# Patient Record
Sex: MALE | Race: WHITE | Employment: UNEMPLOYED | URBAN - METROPOLITAN AREA
[De-identification: names, ages, dates, MRNs, and addresses within clinical notes are randomized per-mention and may not be internally consistent; named-entity substitution may affect disease eponyms.]

---

## 2017-03-19 ENCOUNTER — APPOINTMENT (OUTPATIENT)
Dept: GENERAL RADIOLOGY | Age: 37
End: 2017-03-19
Attending: EMERGENCY MEDICINE
Payer: COMMERCIAL

## 2017-03-19 ENCOUNTER — HOSPITAL ENCOUNTER (EMERGENCY)
Age: 37
Discharge: HOME | End: 2017-03-19
Attending: EMERGENCY MEDICINE
Payer: COMMERCIAL

## 2017-03-19 VITALS
DIASTOLIC BLOOD PRESSURE: 76 MMHG | RESPIRATION RATE: 14 BRPM | HEART RATE: 68 BPM | WEIGHT: 315 LBS | SYSTOLIC BLOOD PRESSURE: 165 MMHG | HEIGHT: 70 IN | BODY MASS INDEX: 45.1 KG/M2 | TEMPERATURE: 97.9 F | OXYGEN SATURATION: 96 %

## 2017-03-19 DIAGNOSIS — F41.9 ANXIETY: ICD-10-CM

## 2017-03-19 DIAGNOSIS — R07.89 ATYPICAL CHEST PAIN: Primary | ICD-10-CM

## 2017-03-19 LAB
ANION GAP BLD CALC-SCNC: 9 MMOL/L (ref 3–18)
ATRIAL RATE: 69 BPM
ATRIAL RATE: 76 BPM
BASOPHILS # BLD AUTO: 0 K/UL (ref 0–0.06)
BASOPHILS # BLD: 1 % (ref 0–2)
BUN SERPL-MCNC: 26 MG/DL (ref 7–18)
BUN/CREAT SERPL: 17 (ref 12–20)
CALCIUM SERPL-MCNC: 8.3 MG/DL (ref 8.5–10.1)
CALCULATED P AXIS, ECG09: 41 DEGREES
CALCULATED P AXIS, ECG09: 44 DEGREES
CALCULATED R AXIS, ECG10: 71 DEGREES
CALCULATED R AXIS, ECG10: 73 DEGREES
CALCULATED T AXIS, ECG11: 75 DEGREES
CALCULATED T AXIS, ECG11: 86 DEGREES
CHLORIDE SERPL-SCNC: 108 MMOL/L (ref 100–108)
CK MB CFR SERPL CALC: NORMAL % (ref 0–4)
CK MB CFR SERPL CALC: NORMAL % (ref 0–4)
CK MB SERPL-MCNC: <1 NG/ML (ref 5–25)
CK MB SERPL-MCNC: <1 NG/ML (ref 5–25)
CK SERPL-CCNC: 145 U/L (ref 39–308)
CK SERPL-CCNC: 199 U/L (ref 39–308)
CO2 SERPL-SCNC: 28 MMOL/L (ref 21–32)
CREAT SERPL-MCNC: 1.5 MG/DL (ref 0.6–1.3)
DIAGNOSIS, 93000: NORMAL
DIAGNOSIS, 93000: NORMAL
DIFFERENTIAL METHOD BLD: ABNORMAL
EOSINOPHIL # BLD: 0.1 K/UL (ref 0–0.4)
EOSINOPHIL NFR BLD: 1 % (ref 0–5)
ERYTHROCYTE [DISTWIDTH] IN BLOOD BY AUTOMATED COUNT: 12.8 % (ref 11.6–14.5)
GLUCOSE SERPL-MCNC: 90 MG/DL (ref 74–99)
HCT VFR BLD AUTO: 47.5 % (ref 36–48)
HGB BLD-MCNC: 16.8 G/DL (ref 13–16)
LYMPHOCYTES # BLD AUTO: 26 % (ref 21–52)
LYMPHOCYTES # BLD: 2.1 K/UL (ref 0.9–3.6)
MCH RBC QN AUTO: 30.4 PG (ref 24–34)
MCHC RBC AUTO-ENTMCNC: 35.4 G/DL (ref 31–37)
MCV RBC AUTO: 85.9 FL (ref 74–97)
MONOCYTES # BLD: 0.9 K/UL (ref 0.05–1.2)
MONOCYTES NFR BLD AUTO: 11 % (ref 3–10)
NEUTS SEG # BLD: 5 K/UL (ref 1.8–8)
NEUTS SEG NFR BLD AUTO: 61 % (ref 40–73)
P-R INTERVAL, ECG05: 126 MS
P-R INTERVAL, ECG05: 132 MS
PLATELET # BLD AUTO: 330 K/UL (ref 135–420)
PMV BLD AUTO: 9 FL (ref 9.2–11.8)
POTASSIUM SERPL-SCNC: 3.9 MMOL/L (ref 3.5–5.5)
Q-T INTERVAL, ECG07: 372 MS
Q-T INTERVAL, ECG07: 412 MS
QRS DURATION, ECG06: 92 MS
QRS DURATION, ECG06: 92 MS
QTC CALCULATION (BEZET), ECG08: 418 MS
QTC CALCULATION (BEZET), ECG08: 441 MS
RBC # BLD AUTO: 5.53 M/UL (ref 4.7–5.5)
SODIUM SERPL-SCNC: 145 MMOL/L (ref 136–145)
TROPONIN I SERPL-MCNC: 0.02 NG/ML (ref 0–0.06)
TROPONIN I SERPL-MCNC: 0.02 NG/ML (ref 0–0.06)
VENTRICULAR RATE, ECG03: 69 BPM
VENTRICULAR RATE, ECG03: 76 BPM
WBC # BLD AUTO: 8.1 K/UL (ref 4.6–13.2)

## 2017-03-19 PROCEDURE — 93005 ELECTROCARDIOGRAM TRACING: CPT

## 2017-03-19 PROCEDURE — 80048 BASIC METABOLIC PNL TOTAL CA: CPT | Performed by: EMERGENCY MEDICINE

## 2017-03-19 PROCEDURE — 99284 EMERGENCY DEPT VISIT MOD MDM: CPT

## 2017-03-19 PROCEDURE — 82550 ASSAY OF CK (CPK): CPT | Performed by: EMERGENCY MEDICINE

## 2017-03-19 PROCEDURE — 71010 XR CHEST PORT: CPT

## 2017-03-19 PROCEDURE — 85025 COMPLETE CBC W/AUTO DIFF WBC: CPT | Performed by: EMERGENCY MEDICINE

## 2017-03-19 PROCEDURE — 74011250637 HC RX REV CODE- 250/637: Performed by: PHYSICIAN ASSISTANT

## 2017-03-19 RX ORDER — HYDROCODONE BITARTRATE AND ACETAMINOPHEN 5; 325 MG/1; MG/1
TABLET ORAL
COMMUNITY
End: 2018-02-02

## 2017-03-19 RX ORDER — HYDROCODONE BITARTRATE AND ACETAMINOPHEN 5; 325 MG/1; MG/1
1 TABLET ORAL
Status: COMPLETED | OUTPATIENT
Start: 2017-03-19 | End: 2017-03-19

## 2017-03-19 RX ORDER — METOPROLOL TARTRATE 100 MG/1
200 TABLET ORAL ONCE
Status: ON HOLD | COMMUNITY
End: 2018-11-17 | Stop reason: SDUPTHER

## 2017-03-19 RX ADMIN — HYDROCODONE BITARTRATE AND ACETAMINOPHEN 1 TABLET: 5; 325 TABLET ORAL at 16:50

## 2017-03-19 NOTE — ED NOTES
Patient armband removed and shredded I have reviewed discharge instructions with the patient. The patient verbalized understanding. Patient NAD at discharge.

## 2017-03-19 NOTE — DISCHARGE INSTRUCTIONS
Anxiety Disorder: Care Instructions  Your Care Instructions  Anxiety is a normal reaction to stress. Difficult situations can cause you to have symptoms such as sweaty palms and a nervous feeling. In an anxiety disorder, the symptoms are far more severe. Constant worry, muscle tension, trouble sleeping, nausea and diarrhea, and other symptoms can make normal daily activities difficult or impossible. These symptoms may occur for no reason, and they can affect your work, school, or social life. Medicines, counseling, and self-care can all help. Follow-up care is a key part of your treatment and safety. Be sure to make and go to all appointments, and call your doctor if you are having problems. It's also a good idea to know your test results and keep a list of the medicines you take. How can you care for yourself at home? · Take medicines exactly as directed. Call your doctor if you think you are having a problem with your medicine. · Go to your counseling sessions and follow-up appointments. · Recognize and accept your anxiety. Then, when you are in a situation that makes you anxious, say to yourself, \"This is not an emergency. I feel uncomfortable, but I am not in danger. I can keep going even if I feel anxious. \"  · Be kind to your body:  ¨ Relieve tension with exercise or a massage. ¨ Get enough rest.  ¨ Avoid alcohol, caffeine, nicotine, and illegal drugs. They can increase your anxiety level and cause sleep problems. ¨ Learn and do relaxation techniques. See below for more about these techniques. · Engage your mind. Get out and do something you enjoy. Go to a funny movie, or take a walk or hike. Plan your day. Having too much or too little to do can make you anxious. · Keep a record of your symptoms. Discuss your fears with a good friend or family member, or join a support group for people with similar problems. Talking to others sometimes relieves stress.   · Get involved in social groups, or volunteer to help others. Being alone sometimes makes things seem worse than they are. · Get at least 30 minutes of exercise on most days of the week to relieve stress. Walking is a good choice. You also may want to do other activities, such as running, swimming, cycling, or playing tennis or team sports. Relaxation techniques  Do relaxation exercises 10 to 20 minutes a day. You can play soothing, relaxing music while you do them, if you wish. · Tell others in your house that you are going to do your relaxation exercises. Ask them not to disturb you. · Find a comfortable place, away from all distractions and noise. · Lie down on your back, or sit with your back straight. · Focus on your breathing. Make it slow and steady. · Breathe in through your nose. Breathe out through either your nose or mouth. · Breathe deeply, filling up the area between your navel and your rib cage. Breathe so that your belly goes up and down. · Do not hold your breath. · Breathe like this for 5 to 10 minutes. Notice the feeling of calmness throughout your whole body. As you continue to breathe slowly and deeply, relax by doing the following for another 5 to 10 minutes:  · Tighten and relax each muscle group in your body. You can begin at your toes and work your way up to your head. · Imagine your muscle groups relaxing and becoming heavy. · Empty your mind of all thoughts. · Let yourself relax more and more deeply. · Become aware of the state of calmness that surrounds you. · When your relaxation time is over, you can bring yourself back to alertness by moving your fingers and toes and then your hands and feet and then stretching and moving your entire body. Sometimes people fall asleep during relaxation, but they usually wake up shortly afterward. · Always give yourself time to return to full alertness before you drive a car or do anything that might cause an accident if you are not fully alert.  Never play a relaxation tape while you drive a car. When should you call for help? Call 911 anytime you think you may need emergency care. For example, call if:  · You feel you cannot stop from hurting yourself or someone else. Keep the numbers for these national suicide hotlines: 1-843-339-TALK (2-585.855.3634) and 9-191-EVRINZN (6-369.967.5885). If you or someone you know talks about suicide or feeling hopeless, get help right away. Watch closely for changes in your health, and be sure to contact your doctor if:  · You have anxiety or fear that affects your life. · You have symptoms of anxiety that are new or different from those you had before. Where can you learn more? Go to http://cristobalEKK Sweet Teaskeith.info/. Enter P754 in the search box to learn more about \"Anxiety Disorder: Care Instructions. \"  Current as of: July 26, 2016  Content Version: 11.1  © 0936-2980 [employer]. Care instructions adapted under license by [employer] (which disclaims liability or warranty for this information). If you have questions about a medical condition or this instruction, always ask your healthcare professional. [address] any warranty or liability for your use of this information. Chest Pain: Care Instructions  Your Care Instructions  There are many things that can cause chest pain. Some are not serious and will get better on their own in a few days. But some kinds of chest pain need more testing and treatment. Your doctor may have recommended a follow-up visit in the next 8 to 12 hours. If you are not getting better, you may need more tests or treatment. Even though your doctor has released you, you still need to watch for any problems. The doctor carefully checked you, but sometimes problems can develop later. If you have new symptoms or if your symptoms do not get better, get medical care right away.   If you have worse or different chest pain or pressure that lasts more than 5 minutes or you passed out (lost consciousness), call 911 or seek other emergency help right away. A medical visit is only one step in your treatment. Even if you feel better, you still need to do what your doctor recommends, such as going to all suggested follow-up appointments and taking medicines exactly as directed. This will help you recover and help prevent future problems. How can you care for yourself at home? · Rest until you feel better. · Take your medicine exactly as prescribed. Call your doctor if you think you are having a problem with your medicine. · Do not drive after taking a prescription pain medicine. When should you call for help? Call 911 if:  · You passed out (lost consciousness). · You have severe difficulty breathing. · You have symptoms of a heart attack. These may include:  ¨ Chest pain or pressure, or a strange feeling in your chest.  ¨ Sweating. ¨ Shortness of breath. ¨ Nausea or vomiting. ¨ Pain, pressure, or a strange feeling in your back, neck, jaw, or upper belly or in one or both shoulders or arms. ¨ Lightheadedness or sudden weakness. ¨ A fast or irregular heartbeat. After you call 911, the  may tell you to chew 1 adult-strength or 2 to 4 low-dose aspirin. Wait for an ambulance. Do not try to drive yourself. Call your doctor today if:  · You have any trouble breathing. · Your chest pain gets worse. · You are dizzy or lightheaded, or you feel like you may faint. · You are not getting better as expected. · You are having new or different chest pain. Where can you learn more? Go to http://cristobal-keith.info/. Enter A120 in the search box to learn more about \"Chest Pain: Care Instructions. \"  Current as of: May 27, 2016  Content Version: 11.1  © 8415-1067 [employer]. Care instructions adapted under license by [employer] (which disclaims liability or warranty for this information).  If you have questions about a medical condition or this instruction, always ask your healthcare professional. [place] [address] any warranty or liability for your use of this information.

## 2017-03-19 NOTE — ED TRIAGE NOTES
Patient arrived with c/o chest pain that started 30 minutes prior to arrival. States he thinks its an anxiety attack. Sepsis Screening completed    (  )Patient meets SIRS criteria. ( x )Patient does not meet SIRS criteria.       SIRS Criteria is achieved when two or more of the following are present   Temperature < 96.8°F (36°C) or > 100.9°F (38.3°C)   Heart Rate > 90 beats per minute   Respiratory Rate > 20 breaths per minute   WBC count > 12,000 or <4,000 or > 10% bands

## 2017-03-19 NOTE — ED NOTES
Pt hourly rounding competed. Safety   Pt (x) resting on stretcher with side rails up and call bell in reach. () in chair    () in parents arms. Toileting   Pt offered ()Bedpan     (x)Assistance to Restroom     ()Urinal  Ongoing Updates  Updated on plan of care and status of test results.   Pain Management  Inquired as to comfort and offered comfort measures:    () warm blankets   () dimmed lights

## 2017-03-19 NOTE — ED PROVIDER NOTES
[address]  EMERGENCY DEPARTMENT HISTORY AND PHYSICAL EXAM       Date: 3/19/2017   Patient Name: Claribel Pang   YOB: 1980  Medical Record Number: [id number]    History of Presenting Illness     Chief Complaint   Patient presents with    Chest Pain        History Provided By:  patient    Additional History:   2:20 PM   Clariebl Pang is a 42 y.o. male h/o anxiety, HTN, and high cholesterol who presents to the emergency department C/O \" I had a panic attack, but my wife thinks I had a heart attack 1 hour ago. \" Symptoms include a squeezing left sided chest pain radiating to the left shoulder and neck. Pt normally has CP 1x every 1.5 months, which he states is normal for his anxiety but today was more severe. 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 Pt had a stress test 5 years ago. Pt just started taking his HTN medications a month ago, but was prescribed it 1 year ago. Pt denies tachycardia, SOB, and any other symptoms or complaints.       Primary Care Provider: Lorenzo Pascual MD   Specialist:    Past History     Past Medical History:   Past Medical History:   Diagnosis Date    ADD (attention deficit disorder)     Chest tightness 4/14/2011    Fatigue     Hyperlipidemia     Palpitation 4/14/2011    Psoriatic arthritis ([address]     SOB (shortness of breath) on exertion 4/14/2011        Past Surgical History:   Past Surgical History:   Procedure Laterality Date    HX TONSIL AND ADENOIDECTOMY          Family History:   Family History   Problem Relation Age of Onset    Heart Attack Mother 36    Heart Attack Father 48    Heart Surgery Father 48        Social History:   Social History   Substance Use Topics    Smoking status: Former Smoker     Packs/day: 1.00     Years: 18.00     Types: Cigarettes     Quit date: 1/1/2008    Smokeless tobacco: None    Alcohol use Yes        Allergies:   No Known Allergies     Review of Systems Review of Systems   Respiratory: Negative for shortness of breath. Cardiovascular: Positive for chest pain (radiating to the left side of neck and shoulder). - Tachycardia   All other systems reviewed and are negative. Physical Exam  Vitals:    03/19/17 1409 03/19/17 1530 03/19/17 1800   BP: (!) 188/96 165/72 165/76   Pulse: 82 68 68   Resp: 18 21 14   Temp: 97.9 °F (36.6 °C)     SpO2: 98% 94% 96%   Weight: 158.8 kg (350 lb)     Height: 5' 10\" (1.778 m)         Physical Exam   Nursing note and vitals reviewed. Vital signs and nursing notes reviewed. CONSTITUTIONAL: Alert. Well-appearing; morbidly obese; in no apparent distress. HEAD: Normocephalic; atraumatic. EYES: PERRL; Conjunctiva clear. ENT: Moist mucus membranes. NECK: Supple; FROM without difficulty, non-tender; no cervical lymphadenopathy. CV: Normal S1, S2; no murmurs, rubs, or gallops. No chest wall tenderness. RESPIRATORY: Normal chest excursion with respiration; breath sounds clear and equal bilaterally; no wheezes, rhonchi, or rales. GI: Normal bowel sounds; non-distended; non-tender; no guarding or rigidity; no palpable organomegaly. No CVA tenderness. BACK:  No evidence of trauma or deformity. Non-tender to palpation. EXT: Normal ROM in all four extremities; non-tender to palpation. 1+ pitting edema bilaterally. No calf tenderness. Some superficial excoriations to bilateral shins from dry skin and scratching. SKIN: Normal for age and race; warm; dry; good turgor; no apparent lesions or exudate. NEURO: A & O x3. Cranial nerves 2-12 intact. Motor 5/5 bilaterally. Sensation intact. PSYCH:  Mood and affect appropriate. Diagnostic Study Results     Labs -      No results found for this or any previous visit (from the past 12 hour(s)). Radiologic Studies -  2:44 PM  RADIOLOGY FINDINGS  Chest X-ray shows no acute process.   Pending review by Radiologist  Recorded by RAFY Caballero, as dictated by Stephenie David PA-C     XR CHEST PORT   Final Result   No radiographic evidence of acute cardiopulmonary abnormality. As read by the radiologist.        Medical Decision Making   I am the first provider for this patient. I reviewed the vital signs, available nursing notes, past medical history, past surgical history, family history and social history. Vital Signs-Reviewed the patient's vital signs. No data found. Pulse Oximetry Analysis - Normal 100% on room air. Cardiac Monitor:   Rate: 86 bpm  Rhythm: Normal Sinus Rhythm     EKG interpretation: (Preliminary)  NSR. Rate: 76 bpm. No ST-T changes. EKG read by Stephenie David PA-C at 2:02 PM     EKG interpretation: (Secondary)  NSR. Rate: 69 bpm. Unchanged from previous. EKG read by Stephenie David PA-C at 5:29 PM       Old Medical Records: Old medical records. Nursing notes. ED Course:    2:20 PM   Initial assessment performed. 4:32 PM  Pt states he is currently taking Vicodin for a toothache and would  like a dose now.      6:23 PM Discussed pt with Tania Hoff MD. Pt's heart score is 3 with 2 sets of neg cardiac enzymes. Pt is currently pain free while in the department. Despite risk factors, pt is addiment that this is anxiety prior to his open house. However, it has been six years since his last stress test, so I will refer him to cardiology follow up. MLP ATTESTATION - PATIENT INVOLVEMENT - DISCUSSION:  I was present in the immediate clinical area during the time of the patient evaluation and available to provide input. I have reviewed the chart and agree with the documentation recorded by the [facility], including the assessment, treatment plan, and disposition. Any procedures performed, as noted, were under my direction and / or I was present for the key portions.      In this particular case, I discussed the findings and results with the MLP and provided specific input and direction as to the care of the patient prior to the patient's discharge / disposition. Specifically, based on the patient's presentation and the results of ordered studies there does not appear to be acute medical / surgical process which warrants admission. Therefore, the patient can be treated symptomatically and discharged to continue with this treatment and planned follow -up. Charly Ring MD            Medications Given in the ED:  Medications   HYDROcodone-acetaminophen (NORCO) 5-325 mg per tablet 1 Tab (1 Tab Oral Given 3/19/17 1650)       Discharge Note:  6:30 PM   Pt has been reexamined. Patient has no new complaints, changes, or physical findings. Care plan outlined and precautions discussed. Results were reviewed with the patient. All medications were reviewed with the patient; will d/c home. All of pt's questions and concerns were addressed. Patient was instructed and agrees to follow up with cardiology, as well as to return to the ED upon further deterioration. Patient is ready to go home. Diagnosis   Clinical Impression:   1. Atypical chest pain    2. Anxiety           Follow-up Information     Follow up With Details Comments 900 Darrell Lei MD In 2 days  [address] [place] [address] [address] [facility] EMERGENCY DEPT  As needed, If symptoms worsen [address] [place] [id number]  [phone]          Discharge Medication List as of 3/19/2017  6:24 PM          _______________________________   Attestations: This note is prepared by Sofie Kussmaul, acting as a Scribe for [employer] on 2:19 PM on 3/19/2017 . [employer]: The scribe's documentation has been prepared under my direction and personally reviewed by me in its entirety.   _______________________________

## 2018-01-30 ENCOUNTER — HOSPITAL ENCOUNTER (OUTPATIENT)
Dept: LAB | Age: 38
Discharge: HOME | End: 2018-01-30

## 2018-01-30 PROCEDURE — 99001 SPECIMEN HANDLING PT-LAB: CPT | Performed by: FAMILY MEDICINE

## 2018-02-02 RX ORDER — COLESEVELAM 180 1/1
1875 TABLET ORAL 2 TIMES DAILY WITH MEALS
Status: ON HOLD | COMMUNITY
End: 2018-12-27

## 2018-02-02 RX ORDER — POTASSIUM CHLORIDE 750 MG/1
10 CAPSULE, EXTENDED RELEASE ORAL 2 TIMES DAILY
Status: ON HOLD | COMMUNITY
End: 2018-12-27

## 2018-02-02 RX ORDER — LEVOTHYROXINE SODIUM 125 UG/1
150 TABLET ORAL
Status: ON HOLD | COMMUNITY
End: 2018-12-27

## 2018-02-02 RX ORDER — LISINOPRIL AND HYDROCHLOROTHIAZIDE 20; 25 MG/1; MG/1
1 TABLET ORAL
COMMUNITY
End: 2018-11-17

## 2018-02-02 RX ORDER — LANOLIN ALCOHOL/MO/W.PET/CERES
400 CREAM (GRAM) TOPICAL
COMMUNITY

## 2018-02-02 NOTE — PROGRESS NOTES
PT aware of NPO status. PT aware of need to hold anticoagulants per protocol. PT aware of potential for sedation administration and need for  at discharge. PT aware of arrival time pre procedure, 0730. Pt states no questions at this time, and has our number. Pt is extremely anxious about procedure.

## 2018-02-14 ENCOUNTER — HOSPITAL ENCOUNTER (OUTPATIENT)
Dept: CT IMAGING | Age: 38
Discharge: HOME | End: 2018-02-14
Attending: INTERNAL MEDICINE | Admitting: RADIOLOGY
Payer: COMMERCIAL

## 2018-02-14 VITALS
BODY MASS INDEX: 45.1 KG/M2 | TEMPERATURE: 97.6 F | DIASTOLIC BLOOD PRESSURE: 77 MMHG | OXYGEN SATURATION: 96 % | WEIGHT: 315 LBS | SYSTOLIC BLOOD PRESSURE: 141 MMHG | HEIGHT: 70 IN | HEART RATE: 66 BPM | RESPIRATION RATE: 16 BRPM

## 2018-02-14 DIAGNOSIS — N17.9 ACUTE RENAL FAILURE SYNDROME (HCC): ICD-10-CM

## 2018-02-14 LAB
ANION GAP SERPL CALC-SCNC: 9 MMOL/L (ref 3–18)
APTT PPP: 36 SEC (ref 23–36.4)
BASOPHILS # BLD: 0 K/UL (ref 0–0.06)
BASOPHILS NFR BLD: 0 % (ref 0–2)
BUN SERPL-MCNC: 31 MG/DL (ref 7–18)
BUN/CREAT SERPL: 11 (ref 12–20)
CALCIUM SERPL-MCNC: 8.9 MG/DL (ref 8.5–10.1)
CHLORIDE SERPL-SCNC: 108 MMOL/L (ref 100–108)
CO2 SERPL-SCNC: 21 MMOL/L (ref 21–32)
CREAT SERPL-MCNC: 2.79 MG/DL (ref 0.6–1.3)
DIFFERENTIAL METHOD BLD: ABNORMAL
EOSINOPHIL # BLD: 0.1 K/UL (ref 0–0.4)
EOSINOPHIL NFR BLD: 2 % (ref 0–5)
ERYTHROCYTE [DISTWIDTH] IN BLOOD BY AUTOMATED COUNT: 13 % (ref 11.6–14.5)
GLUCOSE SERPL-MCNC: 106 MG/DL (ref 74–99)
HCT VFR BLD AUTO: 45 % (ref 36–48)
HGB BLD-MCNC: 15.2 G/DL (ref 13–16)
INR PPP: 1 (ref 0.8–1.2)
LYMPHOCYTES # BLD: 2 K/UL (ref 0.9–3.6)
LYMPHOCYTES NFR BLD: 28 % (ref 21–52)
MCH RBC QN AUTO: 29.5 PG (ref 24–34)
MCHC RBC AUTO-ENTMCNC: 33.8 G/DL (ref 31–37)
MCV RBC AUTO: 87.4 FL (ref 74–97)
MONOCYTES # BLD: 0.8 K/UL (ref 0.05–1.2)
MONOCYTES NFR BLD: 11 % (ref 3–10)
NEUTS SEG # BLD: 4.3 K/UL (ref 1.8–8)
NEUTS SEG NFR BLD: 59 % (ref 40–73)
PLATELET # BLD AUTO: 395 K/UL (ref 135–420)
PMV BLD AUTO: 8.7 FL (ref 9.2–11.8)
POTASSIUM SERPL-SCNC: 4.6 MMOL/L (ref 3.5–5.5)
PROTHROMBIN TIME: 13 SEC (ref 11.5–15.2)
RBC # BLD AUTO: 5.15 M/UL (ref 4.7–5.5)
SODIUM SERPL-SCNC: 138 MMOL/L (ref 136–145)
WBC # BLD AUTO: 7.2 K/UL (ref 4.6–13.2)

## 2018-02-14 PROCEDURE — 99153 MOD SED SAME PHYS/QHP EA: CPT

## 2018-02-14 PROCEDURE — 74011000250 HC RX REV CODE- 250: Performed by: RADIOLOGY

## 2018-02-14 PROCEDURE — 77012 CT SCAN FOR NEEDLE BIOPSY: CPT

## 2018-02-14 PROCEDURE — 88348 ELECTRON MICROSCOPY DX: CPT | Performed by: RADIOLOGY

## 2018-02-14 PROCEDURE — 85025 COMPLETE CBC W/AUTO DIFF WBC: CPT | Performed by: INTERNAL MEDICINE

## 2018-02-14 PROCEDURE — 80048 BASIC METABOLIC PNL TOTAL CA: CPT | Performed by: INTERNAL MEDICINE

## 2018-02-14 PROCEDURE — 88313 SPECIAL STAINS GROUP 2: CPT | Performed by: RADIOLOGY

## 2018-02-14 PROCEDURE — 74011250637 HC RX REV CODE- 250/637: Performed by: INTERNAL MEDICINE

## 2018-02-14 PROCEDURE — 85730 THROMBOPLASTIN TIME PARTIAL: CPT | Performed by: INTERNAL MEDICINE

## 2018-02-14 PROCEDURE — 74011250636 HC RX REV CODE- 250/636: Performed by: RADIOLOGY

## 2018-02-14 PROCEDURE — 74011250637 HC RX REV CODE- 250/637: Performed by: RADIOLOGY

## 2018-02-14 PROCEDURE — 36415 COLL VENOUS BLD VENIPUNCTURE: CPT | Performed by: INTERNAL MEDICINE

## 2018-02-14 PROCEDURE — 88334 PATH CONSLTJ SURG CYTO XM EA: CPT | Performed by: RADIOLOGY

## 2018-02-14 PROCEDURE — 88350 IMFLUOR EA ADDL 1ANTB STN PX: CPT | Performed by: RADIOLOGY

## 2018-02-14 PROCEDURE — 74011000250 HC RX REV CODE- 250

## 2018-02-14 PROCEDURE — 88305 TISSUE EXAM BY PATHOLOGIST: CPT | Performed by: RADIOLOGY

## 2018-02-14 PROCEDURE — 99152 MOD SED SAME PHYS/QHP 5/>YRS: CPT

## 2018-02-14 PROCEDURE — 88346 IMFLUOR 1ST 1ANTB STAIN PX: CPT | Performed by: RADIOLOGY

## 2018-02-14 PROCEDURE — 88333 PATH CONSLTJ SURG CYTO XM 1: CPT | Performed by: RADIOLOGY

## 2018-02-14 PROCEDURE — 85610 PROTHROMBIN TIME: CPT | Performed by: INTERNAL MEDICINE

## 2018-02-14 RX ORDER — HYDROCODONE BITARTRATE AND ACETAMINOPHEN 10; 325 MG/1; MG/1
1 TABLET ORAL
Status: DISCONTINUED | OUTPATIENT
Start: 2018-02-14 | End: 2018-02-14 | Stop reason: HOSPADM

## 2018-02-14 RX ORDER — SODIUM CHLORIDE 9 MG/ML
25 INJECTION, SOLUTION INTRAVENOUS CONTINUOUS
Status: DISCONTINUED | OUTPATIENT
Start: 2018-02-14 | End: 2018-02-14 | Stop reason: HOSPADM

## 2018-02-14 RX ORDER — FLUMAZENIL 0.1 MG/ML
0.2 INJECTION INTRAVENOUS
Status: DISCONTINUED | OUTPATIENT
Start: 2018-02-14 | End: 2018-02-14 | Stop reason: HOSPADM

## 2018-02-14 RX ORDER — SODIUM BICARBONATE 84 MG/ML
1 INJECTION, SOLUTION INTRAVENOUS
Status: DISCONTINUED | OUTPATIENT
Start: 2018-02-14 | End: 2018-02-14 | Stop reason: HOSPADM

## 2018-02-14 RX ORDER — LIDOCAINE HYDROCHLORIDE 10 MG/ML
1-20 INJECTION INFILTRATION; PERINEURAL
Status: DISCONTINUED | OUTPATIENT
Start: 2018-02-14 | End: 2018-02-14 | Stop reason: HOSPADM

## 2018-02-14 RX ORDER — LORAZEPAM 1 MG/1
1 TABLET ORAL ONCE
Status: COMPLETED | OUTPATIENT
Start: 2018-02-14 | End: 2018-02-14

## 2018-02-14 RX ORDER — SODIUM BICARBONATE 1 MEQ/ML
SYRINGE (ML) INTRAVENOUS
Status: COMPLETED
Start: 2018-02-14 | End: 2018-02-14

## 2018-02-14 RX ORDER — MIDAZOLAM HYDROCHLORIDE 1 MG/ML
.5-4 INJECTION, SOLUTION INTRAMUSCULAR; INTRAVENOUS
Status: DISCONTINUED | OUTPATIENT
Start: 2018-02-14 | End: 2018-02-14 | Stop reason: HOSPADM

## 2018-02-14 RX ORDER — NALOXONE HYDROCHLORIDE 0.4 MG/ML
0.2 INJECTION, SOLUTION INTRAMUSCULAR; INTRAVENOUS; SUBCUTANEOUS AS NEEDED
Status: DISCONTINUED | OUTPATIENT
Start: 2018-02-14 | End: 2018-02-14 | Stop reason: HOSPADM

## 2018-02-14 RX ORDER — ONDANSETRON 2 MG/ML
4 INJECTION INTRAMUSCULAR; INTRAVENOUS
Status: DISCONTINUED | OUTPATIENT
Start: 2018-02-14 | End: 2018-02-14 | Stop reason: HOSPADM

## 2018-02-14 RX ORDER — FENTANYL CITRATE 50 UG/ML
25-200 INJECTION, SOLUTION INTRAMUSCULAR; INTRAVENOUS
Status: DISCONTINUED | OUTPATIENT
Start: 2018-02-14 | End: 2018-02-14 | Stop reason: HOSPADM

## 2018-02-14 RX ADMIN — FENTANYL CITRATE 50 MCG: 50 INJECTION, SOLUTION INTRAMUSCULAR; INTRAVENOUS at 10:20

## 2018-02-14 RX ADMIN — MIDAZOLAM HYDROCHLORIDE 2 MG: 1 INJECTION, SOLUTION INTRAMUSCULAR; INTRAVENOUS at 10:20

## 2018-02-14 RX ADMIN — SODIUM BICARBONATE 2 MEQ: 84 INJECTION, SOLUTION INTRAVENOUS at 10:22

## 2018-02-14 RX ADMIN — LIDOCAINE HYDROCHLORIDE 14 ML: 10 INJECTION, SOLUTION INFILTRATION; PERINEURAL at 10:22

## 2018-02-14 RX ADMIN — FENTANYL CITRATE 50 MCG: 50 INJECTION, SOLUTION INTRAMUSCULAR; INTRAVENOUS at 10:13

## 2018-02-14 RX ADMIN — SODIUM CHLORIDE 25 ML/HR: 900 INJECTION, SOLUTION INTRAVENOUS at 09:03

## 2018-02-14 RX ADMIN — HYDROCODONE BITARTRATE AND ACETAMINOPHEN 1 TABLET: 10; 325 TABLET ORAL at 16:17

## 2018-02-14 RX ADMIN — LORAZEPAM 1 MG: 1 TABLET ORAL at 08:24

## 2018-02-14 NOTE — PROCEDURES
Vascular & Interventional Radiology Brief Procedure Note    Interventional Radiologist: Marcel Mccoy MD    Pre-operative Diagnosis:  STEPH, proteinuria. Post-operative Diagnosis: Same as pre-op dx    Procedure(s) Performed:  16 g X 3 medical core bx lower pole left kidney. Assistant:  none    Anesthesia:  Local and Moderate Sedation    Findings:  Uncomplcated bx, satisfactory per Dr. Deven Tineo. Complications: None    Estimated Blood Loss:  minimal    Tubes and Drains: None    Specimens: to pathology.     Condition: Good    [facility] Armida Lamar MD, MD  [facility]  Vascular & Interventional Radiology  2/14/2018

## 2018-02-14 NOTE — DISCHARGE INSTRUCTIONS
Needle Kidney Biopsy: What to Expect at Home  Your Recovery    After a needle kidney biopsy, you will be told to lie down on your back for several hours. After this, you should avoid strenuous activity for the next 2 to 3 days. It's normal to feel some soreness in the area of the biopsy for 2 to 3 days. You may have a small amount of bleeding on the bandage after the biopsy. You may notice some blood in your urine after the biopsy. This should go away within 12 to 24 hours. If it doesn't, call your doctor. If you are feeling well, you should be able to get back to work within a week. But avoid strenuous activity, such as heavy lifting, for up to another week. This care sheet gives you a general idea about how long it will take for you to recover. But each person recovers at a different pace. Follow the steps below to feel better as quickly as possible. How can you care for yourself at home? Activity  ? · Avoid lifting anything that would make you strain. This may include heavy grocery bags and milk containers, a heavy briefcase or backpack, cat litter or dog food bags, a vacuum , or a child. ? · Avoid strenuous activities, such as bicycle riding, jogging, weight lifting, or aerobic exercise, until your doctor says it is okay. ? · Try to walk each day. Start by walking a little more than you did the day before. Bit by bit, increase the amount you walk. Walking boosts blood flow and helps prevent pneumonia and constipation. ? · Rest when you feel tired. Getting enough sleep will help you recover. ? · Ask your doctor when it is okay for you to have sex. Diet  ? · You can eat your normal diet. If your stomach is upset, try bland, low-fat foods like plain rice, broiled chicken, toast, and yogurt. ? · Drink plenty of fluids to avoid becoming dehydrated. Medicines  ? · Your doctor will tell you if and when you can restart your medicines.  He or she will also give you instructions about taking any new medicines. ? · If you take blood thinners, such as warfarin (Coumadin), clopidogrel (Plavix), or aspirin, be sure to talk to your doctor. He or she will tell you if and when to start taking those medicines again. Make sure that you understand exactly what your doctor wants you to do. ? · Do not take aspirin or anti-inflammatory medicines for a week after the biopsy. Incision care  ? · Keep a bandage over the puncture site for the first 1 or 2 days. Follow-up care is a key part of your treatment and safety. Be sure to make and go to all appointments, and call your doctor if you are having problems. It's also a good idea to know your test results and keep a list of the medicines you take. When should you call for help? Call 911 anytime you think you may need emergency care. For example, call if:  ? · You passed out (lost consciousness). ?Call your doctor now or seek immediate medical care if:  ? · You have signs of infection, such as:  ¨ Increased pain, swelling, warmth, or redness. ¨ Red streaks leading from the puncture site. ¨ Pus draining from the puncture site. ¨ A fever. ? · Bright red blood has soaked through the bandage over the puncture site. ? · You have new or worse pain at the puncture site. ? Watch closely for any changes in your health, and call your doctor if:  ? · You have blood in your urine for more than 1 day. Where can you learn more? Go to http://cristobal-keith.info/. Enter S675 in the search box to learn more about \"Needle Kidney Biopsy: What to Expect at Home. \"  Current as of: May 12, 2017  Content Version: 11.4  © 4452-5759 Healthwise, Incorporated. Care instructions adapted under license by [employer] (which disclaims liability or warranty for this information).  If you have questions about a medical condition or this instruction, always ask your healthcare professional. [facility] any warranty or liability for your use of this information. DISCHARGE SUMMARY from Nurse    PATIENT INSTRUCTIONS:    After general anesthesia or intravenous sedation, for 24 hours or while taking prescription Narcotics:  · Limit your activities  · Do not drive and operate hazardous machinery  · Do not make important personal or business decisions  · Do  not drink alcoholic beverages  · If you have not urinated within 8 hours after discharge, please contact your surgeon on call. Report the following to your surgeon:  · Excessive pain, swelling, redness or odor of or around the surgical area  · Temperature over 100.5  · Nausea and vomiting lasting longer than 4 hours or if unable to take medications  · Any signs of decreased circulation or nerve impairment to extremity: change in color, persistent  numbness, tingling, coldness or increase pain  · Any questions    What to do at Home:  Recommended activity: Activity as tolerated and no driving for today,       *  Please give a list of your current medications to your Primary Care Provider. *  Please update this list whenever your medications are discontinued, doses are      changed, or new medications (including over-the-counter products) are added. *  Please carry medication information at all times in case of emergency situations. These are general instructions for a healthy lifestyle:    No smoking/ No tobacco products/ Avoid exposure to second hand smoke  Surgeon General's Warning:  Quitting smoking now greatly reduces serious risk to your health.     Obesity, smoking, and sedentary lifestyle greatly increases your risk for illness    A healthy diet, regular physical exercise & weight monitoring are important for maintaining a healthy lifestyle    You may be retaining fluid if you have a history of heart failure or if you experience any of the following symptoms:  Weight gain of 3 pounds or more overnight or 5 pounds in a week, increased swelling in our hands or feet or shortness of breath while lying flat in bed. Please call your doctor as soon as you notice any of these symptoms; do not wait until your next office visit. Recognize signs and symptoms of STROKE:    F-face looks uneven    A-arms unable to move or move unevenly    S-speech slurred or non-existent    T-time-call 911 as soon as signs and symptoms begin-DO NOT go       Back to bed or wait to see if you get better-TIME IS BRAIN. Warning Signs of HEART ATTACK     Call 911 if you have these symptoms:   Chest discomfort. Most heart attacks involve discomfort in the center of the chest that lasts more than a few minutes, or that goes away and comes back. It can feel like uncomfortable pressure, squeezing, fullness, or pain.  Discomfort in other areas of the upper body. Symptoms can include pain or discomfort in one or both arms, the back, neck, jaw, or stomach.  Shortness of breath with or without chest discomfort.  Other signs may include breaking out in a cold sweat, nausea, or lightheadedness. Don't wait more than five minutes to call 911 - MINUTES MATTER! Fast action can save your life. Calling 911 is almost always the fastest way to get lifesaving treatment. Emergency Medical Services staff can begin treatment when they arrive -- up to an hour sooner than if someone gets to the hospital by car. The discharge information has been reviewed with the patient and spouse. The patient and spouse verbalized understanding. Discharge medications reviewed with the patient and spouse and appropriate educational materials and side effects teaching were provided.   ___________________________________________________________________________________________________________________________________

## 2018-02-14 NOTE — PROGRESS NOTES
Pt sleeping off and on, positioned changed to left side with aid of pillows for comfort, wife at bedside

## 2018-02-14 NOTE — PROGRESS NOTES
1630  Discharge inst given and reviewed with pt and wife, questions answered. Pt complaining of soreness at site increasing with deep breath, site soft. Clean and dry.   Ice pack applied to site, norco given

## 2018-02-14 NOTE — PROGRESS NOTES
Dr Kanika Palomino into speak with pt , consent done.  Pt complaining of pain at labs sites, ice applied

## 2018-02-14 NOTE — PROGRESS NOTES
TRANSFER - OUT REPORT:    Verbal report given to Flor RN on Kathya Kennedy  being transferred to Heart Care(unit) for ordered procedure       Report consisted of patients Situation, Background, Assessment and   Recommendations(SBAR). Information from the following report(s) SBAR, Kardex and MAR was reviewed with the receiving nurse. Lines:   Peripheral IV 02/14/18 Left Wrist (Active)   Site Assessment Clean, dry, & intact 2/14/2018  9:02 AM   Phlebitis Assessment 0 2/14/2018  9:02 AM   Infiltration Assessment 0 2/14/2018  9:02 AM   Dressing Status Clean, dry, & intact 2/14/2018  9:02 AM   Dressing Type Tape;Transparent 2/14/2018  9:02 AM   Hub Color/Line Status Blue;Flushed;Capped; Infusing 2/14/2018  9:02 AM   Action Taken Open ports on tubing capped 2/14/2018  9:02 AM   Alcohol Cap Used Yes 2/14/2018  9:02 AM        Opportunity for questions and clarification was provided.       Patient transported with:   Registered Nurse

## 2018-02-14 NOTE — IP AVS SNAPSHOT
[address] [address] 
[phone] Patient: Joe Patel MRN: [id number] About your hospitalization You were admitted on:  February 14, 2018 You last received care in the:  [address] You were discharged on:  February 14, 2018 Why you were hospitalized Your primary diagnosis was:  Not on File Follow-up Information Follow up With Details Comments Contact Info Alisa Carole, DO  as scheduled [facility] [address] [place] [address] 
[phone] MD Vicki Carrasco [address] 
[address] 
[phone] Discharge Orders None A check shanthi indicates which time of day the medication should be taken. My Medications CONTINUE taking these medications Instructions Each Dose to Equal  
 Morning Noon Evening Bedtime ENBREL 50 mg/mL (0.98 mL) injection Generic drug:  etanercept Your last dose was: Your next dose is:    
   
   
 50 mg by SubCUTAneous route every seven (7) days. 50 mg  
    
   
   
   
  
 lisinopril-hydroCHLOROthiazide 20-25 mg per tablet Commonly known as:  Colten Lyon Your last dose was: Your next dose is: Take 1 Tab by mouth daily. 1 Tab  
    
   
   
   
  
 magnesium oxide 400 mg tablet Commonly known as:  MAG-OX Your last dose was: Your next dose is: Take 400 mg by mouth daily. 400 mg  
    
   
   
   
  
 metoprolol tartrate 100 mg IR tablet Commonly known as:  LOPRESSOR Your last dose was: Your next dose is: Take 200 mg by mouth two (2) times a day. 200 mg  
    
   
   
   
  
 potassium chloride SA 10 mEq capsule Commonly known as:  Db Wang Your last dose was: Your next dose is: Take 10 mEq by mouth two (2) times a day. 10 mEq SYNTHROID 125 mcg tablet Generic drug:  levothyroxine Your last dose was: Your next dose is: Take 125 mcg by mouth Daily (before breakfast). 125 mcg WELCHOL 625 mg tablet Generic drug:  colesevelam  
   
Your last dose was: Your next dose is: Take 1,875 mg by mouth two (2) times daily (with meals). 1875 mg Discharge Instructions Needle Kidney Biopsy: What to Expect at [facility] Your Recovery After a needle kidney biopsy, you will be told to lie down on your back for several hours. After this, you should avoid strenuous activity for the next 2 to 3 days. It's normal to feel some soreness in the area of the biopsy for 2 to 3 days. You may have a small amount of bleeding on the bandage after the biopsy. You may notice some blood in your urine after the biopsy. This should go away within 12 to 24 hours. If it doesn't, call your doctor. If you are feeling well, you should be able to get back to work within a week. But avoid strenuous activity, such as heavy lifting, for up to another week. This care sheet gives you a general idea about how long it will take for you to recover. But each person recovers at a different pace. Follow the steps below to feel better as quickly as possible. How can you care for yourself at home? Activity ? · Avoid lifting anything that would make you strain. This may include heavy grocery bags and milk containers, a heavy briefcase or backpack, cat litter or dog food bags, a vacuum , or a child. ? · Avoid strenuous activities, such as bicycle riding, jogging, weight lifting, or aerobic exercise, until your doctor says it is okay. ? · Try to walk each day. Start by walking a little more than you did the day before. Bit by bit, increase the amount you walk. Walking boosts blood flow and helps prevent pneumonia and constipation. ? · Rest when you feel tired. Getting enough sleep will help you recover. ? · Ask your doctor when it is okay for you to have sex. Diet ? · You can eat your normal diet. If your stomach is upset, try bland, low-fat foods like plain rice, broiled chicken, toast, and yogurt. ? · Drink plenty of fluids to avoid becoming dehydrated. Medicines ? · Your doctor will tell you if and when you can restart your medicines. He or she will also give you instructions about taking any new medicines. ? · If you take blood thinners, such as warfarin (Coumadin), clopidogrel (Plavix), or aspirin, be sure to talk to your doctor. He or she will tell you if and when to start taking those medicines again. Make sure that you understand exactly what your doctor wants you to do. ? · Do not take aspirin or anti-inflammatory medicines for a week after the biopsy. Incision care ? · Keep a bandage over the puncture site for the first 1 or 2 days. Follow-up care is a key part of your treatment and safety. Be sure to make and go to all appointments, and call your doctor if you are having problems. It's also a good idea to know your test results and keep a list of the medicines you take. When should you call for help? Call 911 anytime you think you may need emergency care. For example, call if: 
? · You passed out (lost consciousness). ?Call your doctor now or seek immediate medical care if: 
? · You have signs of infection, such as: 
¨ Increased pain, swelling, warmth, or redness. ¨ Red streaks leading from the puncture site. ¨ Pus draining from the puncture site. ¨ A fever. ? · Bright red blood has soaked through the bandage over the puncture site. ? · You have new or worse pain at the puncture site. ? Watch closely for any changes in your health, and call your doctor if: 
? · You have blood in your urine for more than 1 day. Where can you learn more? Go to http://danisha.info/. Enter S675 in the search box to learn more about \"Needle Kidney Biopsy: What to Expect at Home. \" Current as of: May 12, 2017 Content Version: 11.4 © 8871-5723 [employer]. Care instructions adapted under license by [employer] (which disclaims liability or warranty for this information). If you have questions about a medical condition or this instruction, always ask your healthcare professional. [place] [address] any warranty or liability for your use of this information. DISCHARGE SUMMARY from Nurse PATIENT INSTRUCTIONS: 
 
 
F-face looks uneven A-arms unable to move or move unevenly S-speech slurred or non-existent T-time-call 911 as soon as signs and symptoms begin-DO NOT go Back to bed or wait to see if you get better-TIME IS BRAIN. Warning Signs of HEART ATTACK Call 911 if you have these symptoms: 
? Chest discomfort. Most heart attacks involve discomfort in the center of the chest that lasts more than a few minutes, or that goes away and comes back. It can feel like uncomfortable pressure, squeezing, fullness, or pain. ? Discomfort in other areas of the upper body. Symptoms can include pain or discomfort in one or both arms, the back, neck, jaw, or stomach. ? Shortness of breath with or without chest discomfort. ? Other signs may include breaking out in a cold sweat, nausea, or lightheadedness. Don't wait more than five minutes to call [address]! Fast action can save your life. Calling 911 is almost always the fastest way to get lifesaving treatment. Emergency Medical Services staff can begin treatment when they arrive  up to an hour sooner than if someone gets to the hospital by car. The discharge information has been reviewed with the patient and spouse. The patient and spouse verbalized understanding. Discharge medications reviewed with the patient and spouse and appropriate educational materials and side effects teaching were provided. ___________________________________________________________________________________________________________________________________ Introducing [address]! Liat Morales introduces "[employer] patient portal. Now you can access parts of your medical record, email your doctor's office, and request medication refills online. 1. In your internet browser, go to [email or web address] 2. Click on the First Time User? Click Here link in the Sign In box. You will see the New Member Sign Up page. 3. Enter your "[employer] Access Code exactly as it appears below. You will not need to use this code after youve completed the sign-up process. If you do not sign up before the expiration date, you must request a new code. · "[employer] Access Code: [email or web address] Expires: 4/30/2018  6:35 AM 
 
4. Enter the last four digits of your Social Security Number (xxxx) and Date of Birth (mm/dd/yyyy) as indicated and click Submit. You will be taken to the next sign-up page. 5. Create a "[employer] ID. This will be your "[employer] login ID and cannot be changed, so think of one that is secure and easy to remember. 6. Create a "[employer] password. You can change your password at any time. 7. Enter your Password Reset Question and Answer. This can be used at a later time if you forget your password. 8. Enter your e-mail address. You will receive e-mail notification when new information is available in [address] 9. Click Sign Up. You can now view and download portions of your medical record. 10. Click the Download Summary menu link to download a portable copy of your medical information.  
 
If you have questions, please visit the Frequently Asked Questions section of the [facility]. Remember, MyChart is NOT to be used for urgent needs. For medical emergencies, dial 911. Now available from your iPhone and Android! Providers Seen During Your Hospitalization Provider Specialty Primary office phone Ashlyn Crowe, [address] Nephrology [phone] Your Primary Care Physician (PCP) Primary Care Physician Office Phone Office Fax Trinity Bose [phone] You are allergic to the following No active allergies Recent Documentation Height Weight BMI Smoking Status 1.765 m 146.1 kg 46.9 kg/m2 Former Smoker Emergency Contacts Name Discharge Info Relation Home Work Mobile Madhuri Corea DISCHARGE CAREGIVER [3] Spouse [3] [phone] Patient Belongings The following personal items are in your possession at time of discharge: 
     Visual Aid: Glasses Please provide this summary of care documentation to your next provider. Signatures-by signing, you are acknowledging that this After Visit Summary has been reviewed with you and you have received a copy. Patient Signature:  ____________________________________________________________ Date:  ____________________________________________________________  
  
Lena Draper Provider Signature:  ____________________________________________________________ Date:  ____________________________________________________________

## 2018-02-14 NOTE — PROGRESS NOTES
Pt very anxious, hyperventilating.   Encourage to breath though nose out through mouth, reassurance given and family at bedside

## 2018-02-14 NOTE — PROGRESS NOTES
VASCULAR & INTERVENTIONAL RADIOLOGY PROGRESS NOTE    History and Physical reviewed; I have examined the patient and there are no pertinent changes. Pt is an appropriate candidate to undergo renal bx under moderate sedation.       Chencho Herr MD, MD  Vascular & Interventional Radiology  [facility]  2/14/2018

## 2018-03-09 ENCOUNTER — HOSPITAL ENCOUNTER (OUTPATIENT)
Dept: LAB | Age: 38
Discharge: HOME | End: 2018-03-09
Payer: COMMERCIAL

## 2018-03-09 LAB
ANION GAP SERPL CALC-SCNC: 10 MMOL/L (ref 3–18)
BASOPHILS # BLD: 0 K/UL (ref 0–0.06)
BASOPHILS NFR BLD: 0 % (ref 0–2)
BUN SERPL-MCNC: 45 MG/DL (ref 7–18)
BUN/CREAT SERPL: 18 (ref 12–20)
CALCIUM SERPL-MCNC: 8.7 MG/DL (ref 8.5–10.1)
CHLORIDE SERPL-SCNC: 108 MMOL/L (ref 100–108)
CO2 SERPL-SCNC: 24 MMOL/L (ref 21–32)
CREAT SERPL-MCNC: 2.54 MG/DL (ref 0.6–1.3)
CREAT UR-MCNC: 114.28 MG/DL (ref 30–125)
DIFFERENTIAL METHOD BLD: ABNORMAL
EOSINOPHIL # BLD: 0 K/UL (ref 0–0.4)
EOSINOPHIL NFR BLD: 0 % (ref 0–5)
ERYTHROCYTE [DISTWIDTH] IN BLOOD BY AUTOMATED COUNT: 13.5 % (ref 11.6–14.5)
GLUCOSE SERPL-MCNC: 100 MG/DL (ref 74–99)
HBV SURFACE AG SER QL: 0.51 INDEX
HBV SURFACE AG SER QL: NEGATIVE
HCT VFR BLD AUTO: 45.2 % (ref 36–48)
HGB BLD-MCNC: 15.1 G/DL (ref 13–16)
LYMPHOCYTES # BLD: 1.7 K/UL (ref 0.9–3.6)
LYMPHOCYTES NFR BLD: 9 % (ref 21–52)
MAGNESIUM SERPL-MCNC: 2 MG/DL (ref 1.6–2.6)
MCH RBC QN AUTO: 29.4 PG (ref 24–34)
MCHC RBC AUTO-ENTMCNC: 33.4 G/DL (ref 31–37)
MCV RBC AUTO: 88.1 FL (ref 74–97)
MICROALBUMIN UR-MCNC: 751 MG/DL (ref 0–3)
MICROALBUMIN/CREAT UR-RTO: 6572 MG/G (ref 0–30)
MONOCYTES # BLD: 0.8 K/UL (ref 0.05–1.2)
MONOCYTES NFR BLD: 4 % (ref 3–10)
NEUTS SEG # BLD: 16.8 K/UL (ref 1.8–8)
NEUTS SEG NFR BLD: 87 % (ref 40–73)
PLATELET # BLD AUTO: 475 K/UL (ref 135–420)
PMV BLD AUTO: 9.4 FL (ref 9.2–11.8)
POTASSIUM SERPL-SCNC: 5.1 MMOL/L (ref 3.5–5.5)
PROT UR-MCNC: 1132 MG/DL
RBC # BLD AUTO: 5.13 M/UL (ref 4.7–5.5)
RPR SER QL: NONREACTIVE
SODIUM SERPL-SCNC: 142 MMOL/L (ref 136–145)
WBC # BLD AUTO: 19.4 K/UL (ref 4.6–13.2)

## 2018-03-09 PROCEDURE — 87340 HEPATITIS B SURFACE AG IA: CPT | Performed by: INTERNAL MEDICINE

## 2018-03-09 PROCEDURE — 86225 DNA ANTIBODY NATIVE: CPT | Performed by: INTERNAL MEDICINE

## 2018-03-09 PROCEDURE — 86592 SYPHILIS TEST NON-TREP QUAL: CPT | Performed by: INTERNAL MEDICINE

## 2018-03-09 PROCEDURE — 36415 COLL VENOUS BLD VENIPUNCTURE: CPT | Performed by: INTERNAL MEDICINE

## 2018-03-09 PROCEDURE — 80048 BASIC METABOLIC PNL TOTAL CA: CPT | Performed by: INTERNAL MEDICINE

## 2018-03-09 PROCEDURE — 82043 UR ALBUMIN QUANTITATIVE: CPT | Performed by: INTERNAL MEDICINE

## 2018-03-09 PROCEDURE — 84156 ASSAY OF PROTEIN URINE: CPT | Performed by: INTERNAL MEDICINE

## 2018-03-09 PROCEDURE — 83735 ASSAY OF MAGNESIUM: CPT | Performed by: INTERNAL MEDICINE

## 2018-03-09 PROCEDURE — 85025 COMPLETE CBC W/AUTO DIFF WBC: CPT | Performed by: INTERNAL MEDICINE

## 2018-03-09 PROCEDURE — 87389 HIV-1 AG W/HIV-1&-2 AB AG IA: CPT | Performed by: INTERNAL MEDICINE

## 2018-03-10 LAB — DSDNA AB SER-ACNC: <1 IU/ML (ref 0–9)

## 2018-03-12 LAB
HIV 1+2 AB+HIV1 P24 AG SERPL QL IA: NONREACTIVE
HIV12 RESULT COMMENT, HHIVC: NORMAL

## 2018-03-13 LAB
FAX TO INFO,FAXT: NORMAL
FAX TO NUMBER,FAXN: NORMAL

## 2018-03-26 ENCOUNTER — HOSPITAL ENCOUNTER (OUTPATIENT)
Dept: LAB | Age: 38
Discharge: HOME | End: 2018-03-26

## 2018-03-26 PROCEDURE — 99001 SPECIMEN HANDLING PT-LAB: CPT | Performed by: FAMILY MEDICINE

## 2018-07-01 ENCOUNTER — HOSPITAL ENCOUNTER (OUTPATIENT)
Dept: LAB | Age: 38
Discharge: HOME | End: 2018-07-01

## 2018-07-01 ENCOUNTER — HOSPITAL ENCOUNTER (OUTPATIENT)
Dept: LAB | Age: 38
Discharge: HOME | End: 2018-07-01
Payer: COMMERCIAL

## 2018-07-01 LAB
ALBUMIN SERPL-MCNC: 1.9 G/DL (ref 3.4–5)
ALBUMIN/GLOB SERPL: 0.5 {RATIO} (ref 0.8–1.7)
ALP SERPL-CCNC: 70 U/L (ref 45–117)
ALT SERPL-CCNC: 29 U/L (ref 16–61)
ANION GAP SERPL CALC-SCNC: 11 MMOL/L (ref 3–18)
APPEARANCE UR: CLEAR
AST SERPL-CCNC: 17 U/L (ref 15–37)
BACTERIA URNS QL MICRO: NEGATIVE /HPF
BILIRUB SERPL-MCNC: 0.2 MG/DL (ref 0.2–1)
BILIRUB UR QL: NEGATIVE
BUN SERPL-MCNC: 51 MG/DL (ref 7–18)
BUN/CREAT SERPL: 22 (ref 12–20)
CALCIUM SERPL-MCNC: 8 MG/DL (ref 8.5–10.1)
CHLORIDE SERPL-SCNC: 107 MMOL/L (ref 100–108)
CK SERPL-CCNC: 96 U/L (ref 39–308)
CO2 SERPL-SCNC: 24 MMOL/L (ref 21–32)
COLOR UR: YELLOW
CREAT SERPL-MCNC: 2.33 MG/DL (ref 0.6–1.3)
CREAT UR-MCNC: 86.06 MG/DL (ref 30–125)
CREAT UR-MCNC: 87.1 MG/DL (ref 30–125)
EPITH CASTS URNS QL MICRO: NORMAL /LPF (ref 0–5)
GLOBULIN SER CALC-MCNC: 3.8 G/DL (ref 2–4)
GLUCOSE SERPL-MCNC: 90 MG/DL (ref 74–99)
GLUCOSE UR STRIP.AUTO-MCNC: 100 MG/DL
HGB BLD-MCNC: 13.8 G/DL (ref 13–16)
HGB UR QL STRIP: ABNORMAL
KETONES UR QL STRIP.AUTO: NEGATIVE MG/DL
LEUKOCYTE ESTERASE UR QL STRIP.AUTO: NEGATIVE
MAGNESIUM SERPL-MCNC: 2.2 MG/DL (ref 1.6–2.6)
MICROALBUMIN UR-MCNC: 1210 MG/DL (ref 0–3)
MICROALBUMIN/CREAT UR-RTO: ABNORMAL MG/G (ref 0–30)
NITRITE UR QL STRIP.AUTO: NEGATIVE
PH UR STRIP: 5.5 [PH] (ref 5–8)
PHOSPHATE SERPL-MCNC: 5.7 MG/DL (ref 2.5–4.9)
POTASSIUM SERPL-SCNC: 4.8 MMOL/L (ref 3.5–5.5)
PROT SERPL-MCNC: 5.7 G/DL (ref 6.4–8.2)
PROT UR STRIP-MCNC: >1000 MG/DL
PROT UR-MCNC: 882 MG/DL
PROT/CREAT UR-RTO: 10.2
RBC #/AREA URNS HPF: NORMAL /HPF (ref 0–5)
SODIUM SERPL-SCNC: 142 MMOL/L (ref 136–145)
SP GR UR REFRACTOMETRY: 1.02 (ref 1–1.03)
UROBILINOGEN UR QL STRIP.AUTO: 0.2 EU/DL (ref 0.2–1)
WBC URNS QL MICRO: NORMAL /HPF (ref 0–5)

## 2018-07-01 PROCEDURE — 84100 ASSAY OF PHOSPHORUS: CPT | Performed by: INTERNAL MEDICINE

## 2018-07-01 PROCEDURE — 82550 ASSAY OF CK (CPK): CPT | Performed by: INTERNAL MEDICINE

## 2018-07-01 PROCEDURE — 36415 COLL VENOUS BLD VENIPUNCTURE: CPT | Performed by: INTERNAL MEDICINE

## 2018-07-01 PROCEDURE — 81001 URINALYSIS AUTO W/SCOPE: CPT | Performed by: INTERNAL MEDICINE

## 2018-07-01 PROCEDURE — 83735 ASSAY OF MAGNESIUM: CPT | Performed by: INTERNAL MEDICINE

## 2018-07-01 PROCEDURE — 80053 COMPREHEN METABOLIC PANEL: CPT | Performed by: INTERNAL MEDICINE

## 2018-07-01 PROCEDURE — 82043 UR ALBUMIN QUANTITATIVE: CPT | Performed by: INTERNAL MEDICINE

## 2018-07-01 PROCEDURE — 84156 ASSAY OF PROTEIN URINE: CPT | Performed by: INTERNAL MEDICINE

## 2018-07-01 PROCEDURE — 85018 HEMOGLOBIN: CPT | Performed by: INTERNAL MEDICINE

## 2018-07-26 ENCOUNTER — HOSPITAL ENCOUNTER (OUTPATIENT)
Dept: LAB | Age: 38
Discharge: HOME | End: 2018-07-26

## 2018-07-26 PROCEDURE — 99001 SPECIMEN HANDLING PT-LAB: CPT | Performed by: FAMILY MEDICINE

## 2018-07-26 PROCEDURE — 99001 SPECIMEN HANDLING PT-LAB: CPT | Performed by: INTERNAL MEDICINE

## 2018-08-05 ENCOUNTER — HOSPITAL ENCOUNTER (OUTPATIENT)
Dept: LAB | Age: 38
Discharge: HOME | End: 2018-08-05

## 2018-08-05 LAB — XX-LABCORP SPECIMEN COL,LCBCF: NORMAL

## 2018-08-05 PROCEDURE — 99001 SPECIMEN HANDLING PT-LAB: CPT | Performed by: INTERNAL MEDICINE

## 2018-11-15 ENCOUNTER — APPOINTMENT (OUTPATIENT)
Dept: NUCLEAR MEDICINE | Age: 38
End: 2018-11-15
Attending: HOSPITALIST
Payer: COMMERCIAL

## 2018-11-15 ENCOUNTER — APPOINTMENT (OUTPATIENT)
Dept: GENERAL RADIOLOGY | Age: 38
End: 2018-11-15
Attending: EMERGENCY MEDICINE
Payer: COMMERCIAL

## 2018-11-15 ENCOUNTER — HOSPITAL ENCOUNTER (OUTPATIENT)
Age: 38
Setting detail: OBSERVATION
Discharge: HOME | End: 2018-11-17
Attending: EMERGENCY MEDICINE | Admitting: HOSPITALIST
Payer: COMMERCIAL

## 2018-11-15 DIAGNOSIS — Z78.9 POOR CONDITIONING: ICD-10-CM

## 2018-11-15 DIAGNOSIS — E66.01 MORBIDLY OBESE (HCC): ICD-10-CM

## 2018-11-15 DIAGNOSIS — N18.9 CHRONIC RENAL FAILURE, UNSPECIFIED CKD STAGE: ICD-10-CM

## 2018-11-15 DIAGNOSIS — R06.09 DYSPNEA ON EXERTION: Primary | ICD-10-CM

## 2018-11-15 PROBLEM — N18.30 STAGE 3 CHRONIC KIDNEY DISEASE (HCC): Status: ACTIVE | Noted: 2018-11-15

## 2018-11-15 PROBLEM — G47.33 OSA (OBSTRUCTIVE SLEEP APNEA): Status: ACTIVE | Noted: 2018-11-15

## 2018-11-15 PROBLEM — I95.1 ORTHOSTATIC HYPOTENSION: Status: ACTIVE | Noted: 2018-11-15

## 2018-11-15 PROBLEM — I10 HTN (HYPERTENSION): Status: ACTIVE | Noted: 2018-11-15

## 2018-11-15 PROBLEM — E03.9 HYPOTHYROIDISM: Status: ACTIVE | Noted: 2018-11-15

## 2018-11-15 LAB
ANION GAP SERPL CALC-SCNC: 14 MMOL/L (ref 3–18)
BASOPHILS # BLD: 0 K/UL (ref 0–0.1)
BASOPHILS NFR BLD: 0 % (ref 0–2)
BNP SERPL-MCNC: 2547 PG/ML (ref 0–450)
BUN SERPL-MCNC: 48 MG/DL (ref 7–18)
BUN/CREAT SERPL: 27
CALCIUM SERPL-MCNC: 8.4 MG/DL (ref 8.5–10.1)
CHLORIDE SERPL-SCNC: 104 MMOL/L (ref 100–108)
CO2 SERPL-SCNC: 21 MMOL/L (ref 21–32)
CREAT SERPL-MCNC: 1.79 MG/DL (ref 0.6–1.3)
DIFFERENTIAL METHOD BLD: ABNORMAL
EOSINOPHIL # BLD: 0 K/UL (ref 0–0.4)
EOSINOPHIL NFR BLD: 0 % (ref 0–5)
ERYTHROCYTE [DISTWIDTH] IN BLOOD BY AUTOMATED COUNT: 15 % (ref 11.6–14.5)
GLUCOSE SERPL-MCNC: 117 MG/DL (ref 74–99)
HCT VFR BLD AUTO: 29.9 % (ref 36–48)
HGB BLD-MCNC: 9.3 G/DL (ref 13–16)
LYMPHOCYTES # BLD: 0.6 K/UL (ref 0.9–3.6)
LYMPHOCYTES NFR BLD: 6 % (ref 21–52)
MCH RBC QN AUTO: 30 PG (ref 24–34)
MCHC RBC AUTO-ENTMCNC: 31.1 G/DL (ref 31–37)
MCV RBC AUTO: 96.5 FL (ref 74–97)
MONOCYTES # BLD: 0.7 K/UL (ref 0.05–1.2)
MONOCYTES NFR BLD: 8 % (ref 3–10)
NEUTS SEG # BLD: 7.9 K/UL (ref 1.8–8)
NEUTS SEG NFR BLD: 86 % (ref 40–73)
PLATELET # BLD AUTO: 123 K/UL (ref 135–420)
PMV BLD AUTO: 8.9 FL (ref 9.2–11.8)
POTASSIUM SERPL-SCNC: 4.4 MMOL/L (ref 3.5–5.5)
RBC # BLD AUTO: 3.1 M/UL (ref 4.7–5.5)
SODIUM SERPL-SCNC: 139 MMOL/L (ref 136–145)
WBC # BLD AUTO: 9.2 K/UL (ref 4.6–13.2)

## 2018-11-15 PROCEDURE — 74011636637 HC RX REV CODE- 636/637: Performed by: HOSPITALIST

## 2018-11-15 PROCEDURE — 80048 BASIC METABOLIC PNL TOTAL CA: CPT

## 2018-11-15 PROCEDURE — 96360 HYDRATION IV INFUSION INIT: CPT

## 2018-11-15 PROCEDURE — 99218 HC RM OBSERVATION: CPT

## 2018-11-15 PROCEDURE — 99285 EMERGENCY DEPT VISIT HI MDM: CPT

## 2018-11-15 PROCEDURE — 83880 ASSAY OF NATRIURETIC PEPTIDE: CPT

## 2018-11-15 PROCEDURE — 96361 HYDRATE IV INFUSION ADD-ON: CPT

## 2018-11-15 PROCEDURE — 74011250636 HC RX REV CODE- 250/636: Performed by: HOSPITALIST

## 2018-11-15 PROCEDURE — 96365 THER/PROPH/DIAG IV INF INIT: CPT

## 2018-11-15 PROCEDURE — A9567 TECHNETIUM TC-99M AEROSOL: HCPCS

## 2018-11-15 PROCEDURE — 77030011256 HC DRSG MEPILEX <16IN NO BORD MOLN -A

## 2018-11-15 PROCEDURE — 74011250637 HC RX REV CODE- 250/637: Performed by: HOSPITALIST

## 2018-11-15 PROCEDURE — 96372 THER/PROPH/DIAG INJ SC/IM: CPT

## 2018-11-15 PROCEDURE — 74011250636 HC RX REV CODE- 250/636: Performed by: EMERGENCY MEDICINE

## 2018-11-15 PROCEDURE — 94660 CPAP INITIATION&MGMT: CPT

## 2018-11-15 PROCEDURE — 77030035694 HC MSK BIPAP FLL FAC PERFMAX PHIL -B

## 2018-11-15 PROCEDURE — 71045 X-RAY EXAM CHEST 1 VIEW: CPT

## 2018-11-15 PROCEDURE — 85025 COMPLETE CBC W/AUTO DIFF WBC: CPT

## 2018-11-15 RX ORDER — HEPARIN SODIUM 5000 [USP'U]/ML
5000 INJECTION, SOLUTION INTRAVENOUS; SUBCUTANEOUS EVERY 8 HOURS
Status: DISCONTINUED | OUTPATIENT
Start: 2018-11-15 | End: 2018-11-17 | Stop reason: HOSPADM

## 2018-11-15 RX ORDER — MYCOPHENOLATE MOFETIL 250 MG/1
1000 CAPSULE ORAL 2 TIMES DAILY
Status: DISCONTINUED | OUTPATIENT
Start: 2018-11-16 | End: 2018-11-17 | Stop reason: HOSPADM

## 2018-11-15 RX ORDER — MYCOPHENOLATE MOFETIL 500 MG/1
1000 TABLET ORAL EVERY 12 HOURS
COMMUNITY
End: 2020-09-23 | Stop reason: ALTCHOICE

## 2018-11-15 RX ORDER — LOPERAMIDE HCL 2 MG
4 TABLET ORAL EVERY 12 HOURS
COMMUNITY

## 2018-11-15 RX ORDER — DOCUSATE SODIUM 100 MG/1
100 CAPSULE, LIQUID FILLED ORAL 2 TIMES DAILY
Status: DISCONTINUED | OUTPATIENT
Start: 2018-11-15 | End: 2018-11-15

## 2018-11-15 RX ORDER — SODIUM CHLORIDE 9 MG/ML
100 INJECTION, SOLUTION INTRAVENOUS CONTINUOUS
Status: DISCONTINUED | OUTPATIENT
Start: 2018-11-15 | End: 2018-11-17 | Stop reason: HOSPADM

## 2018-11-15 RX ORDER — ALFUZOSIN HYDROCHLORIDE 10 MG/1
10 TABLET, EXTENDED RELEASE ORAL
COMMUNITY
End: 2020-09-23 | Stop reason: ALTCHOICE

## 2018-11-15 RX ORDER — COLESEVELAM 180 1/1
1875 TABLET ORAL 2 TIMES DAILY WITH MEALS
Status: DISCONTINUED | OUTPATIENT
Start: 2018-11-15 | End: 2018-11-16

## 2018-11-15 RX ORDER — ALFUZOSIN HYDROCHLORIDE 10 MG/1
10 TABLET, EXTENDED RELEASE ORAL
Status: DISCONTINUED | OUTPATIENT
Start: 2018-11-15 | End: 2018-11-17 | Stop reason: HOSPADM

## 2018-11-15 RX ORDER — NALOXONE HYDROCHLORIDE 0.4 MG/ML
0.4 INJECTION, SOLUTION INTRAMUSCULAR; INTRAVENOUS; SUBCUTANEOUS AS NEEDED
Status: DISCONTINUED | OUTPATIENT
Start: 2018-11-15 | End: 2018-11-17 | Stop reason: HOSPADM

## 2018-11-15 RX ORDER — ONDANSETRON 2 MG/ML
4 INJECTION INTRAMUSCULAR; INTRAVENOUS
Status: DISCONTINUED | OUTPATIENT
Start: 2018-11-15 | End: 2018-11-17 | Stop reason: HOSPADM

## 2018-11-15 RX ORDER — PREDNISONE 20 MG/1
10 TABLET ORAL EVERY OTHER DAY
COMMUNITY
Start: 2019-01-10

## 2018-11-15 RX ORDER — DIPHENHYDRAMINE HYDROCHLORIDE 50 MG/ML
12.5 INJECTION, SOLUTION INTRAMUSCULAR; INTRAVENOUS
Status: DISCONTINUED | OUTPATIENT
Start: 2018-11-15 | End: 2018-11-17 | Stop reason: HOSPADM

## 2018-11-15 RX ORDER — LISINOPRIL AND HYDROCHLOROTHIAZIDE 20; 25 MG/1; MG/1
1 TABLET ORAL
Status: DISCONTINUED | OUTPATIENT
Start: 2018-11-16 | End: 2018-11-15 | Stop reason: CLARIF

## 2018-11-15 RX ORDER — METOPROLOL TARTRATE 50 MG/1
200 TABLET ORAL ONCE
Status: DISCONTINUED | OUTPATIENT
Start: 2018-11-16 | End: 2018-11-15

## 2018-11-15 RX ORDER — ACETAMINOPHEN 325 MG/1
650 TABLET ORAL
Status: DISCONTINUED | OUTPATIENT
Start: 2018-11-15 | End: 2018-11-17 | Stop reason: HOSPADM

## 2018-11-15 RX ORDER — LOPERAMIDE HYDROCHLORIDE 2 MG/1
4 CAPSULE ORAL AS NEEDED
Status: DISCONTINUED | OUTPATIENT
Start: 2018-11-15 | End: 2018-11-17 | Stop reason: HOSPADM

## 2018-11-15 RX ORDER — LEVOTHYROXINE SODIUM 150 UG/1
150 TABLET ORAL
Status: DISCONTINUED | OUTPATIENT
Start: 2018-11-15 | End: 2018-11-17 | Stop reason: HOSPADM

## 2018-11-15 RX ORDER — METOPROLOL TARTRATE 50 MG/1
200 TABLET ORAL ONCE
Status: DISCONTINUED | OUTPATIENT
Start: 2018-11-15 | End: 2018-11-15

## 2018-11-15 RX ORDER — MYCOPHENOLATE MOFETIL 250 MG/1
1000 CAPSULE ORAL 2 TIMES DAILY
Status: DISCONTINUED | OUTPATIENT
Start: 2018-11-15 | End: 2018-11-15

## 2018-11-15 RX ORDER — PREDNISONE 20 MG/1
20 TABLET ORAL 3 TIMES DAILY
Status: DISCONTINUED | OUTPATIENT
Start: 2018-11-15 | End: 2018-11-17 | Stop reason: HOSPADM

## 2018-11-15 RX ADMIN — SODIUM CHLORIDE 100 ML/HR: 900 INJECTION, SOLUTION INTRAVENOUS at 18:02

## 2018-11-15 RX ADMIN — SODIUM CHLORIDE 500 ML: 900 INJECTION, SOLUTION INTRAVENOUS at 13:21

## 2018-11-15 RX ADMIN — HEPARIN SODIUM 5000 UNITS: 5000 INJECTION INTRAVENOUS; SUBCUTANEOUS at 18:01

## 2018-11-15 RX ADMIN — PREDNISONE 20 MG: 20 TABLET ORAL at 21:34

## 2018-11-15 RX ADMIN — ALFUZOSIN HYDROCHLORIDE 10 MG: 10 TABLET ORAL at 21:34

## 2018-11-15 RX ADMIN — LEVOTHYROXINE SODIUM 150 MCG: 150 TABLET ORAL at 21:34

## 2018-11-15 NOTE — ED PROVIDER NOTES
EMERGENCY DEPARTMENT HISTORY AND PHYSICAL EXAM 
 
Date: 11/15/2018 Patient Name: Olinda Coronel History of Presenting Illness Chief Complaint Patient presents with  
 Other History Provided By: Patient Chief Complaint: hypotension Duration: PTA Timing:  recurrent Location: wrist 
Modifying Factors: with exertion/ PT Associated Symptoms: dizziness Additional History (Context):  
12:45 PM  
Olinda Coronel is a 34 y.o. male with PMHX of CKD, sleep apnea who presents to the emergency department C/O hypotension noted on his wrist monitor while he was in PT. Associated sxs include dizziness. States this a recurrent issue with any exertion. He has been in PT for 6 weeks. The patient is on Prednisone for his kidneys for 8 months. The patient has chronic wounds to bilateral lower extremities and is followed by [facility]. He is followed by Iker Larson MD who instructed the patient to come to the ED for further evaluation. Patient's cardiologist is All Acosta MD, and is followed by Gabriella Warren DO, nephrology He ambulates with a walker. The patient does not use his CPAP, stating he is a stomach sleeper and drools into the mask. He is on Metoprolol. Pt denies fever, shortness of breath, chest pain, vomiting, diarrhea, and any other sxs or complaints. PCP: Zunilda Franco MD 
 
Current Outpatient Medications Medication Sig Dispense Refill  predniSONE (DELTASONE) 20 mg tablet Take 60 mg by mouth three (3) times daily.  mycophenolate (CELLCEPT) 500 mg tablet Take 500 mg by mouth two (2) times a day.  alfuzosin SR (UROXATRAL) 10 mg SR tablet Take 10 mg by mouth daily.  lisinopril-hydroCHLOROthiazide (PRINZIDE, ZESTORETIC) 20-25 mg per tablet Take 1 Tab by mouth daily.  magnesium oxide (MAG-OX) 400 mg tablet Take 400 mg by mouth daily.  levothyroxine (SYNTHROID) 125 mcg tablet Take 150 mcg by mouth Daily (before breakfast).  metoprolol tartrate (LOPRESSOR) 100 mg IR tablet Take 200 mg by mouth two (2) times a day.  potassium chloride SA (MICRO-K) 10 mEq capsule Take 10 mEq by mouth two (2) times a day.  colesevelam (WELCHOL) 625 mg tablet Take 1,875 mg by mouth two (2) times daily (with meals).  etanercept (ENBREL) 50 mg/mL (0.98 mL) injection 50 mg by SubCUTAneous route every seven (7) days. Past History Past Medical History: 
Past Medical History:  
Diagnosis Date  ADD (attention deficit disorder)  Chest tightness 4/14/2011  Chronic kidney disease  Fatigue  Hyperlipidemia  Hypertension  Morbid obesity ([facility] [address]  Palpitation 4/14/2011  Psoriatic arthritis ([facility] [address]  Psychiatric disorder   
 anxiety  SOB (shortness of breath) on exertion 4/14/2011 Past Surgical History: 
Past Surgical History:  
Procedure Laterality Date  HX TONSIL AND ADENOIDECTOMY  HX TONSILLECTOMY Family History: 
Family History Problem Relation Age of Onset  Heart Attack Mother 36  
 Heart Attack Father 48  
 Heart Surgery Father 48 Social History: 
Social History Tobacco Use  Smoking status: Former Smoker Packs/day: 1.00 Years: 18.00 Pack years: 18.00 Types: Cigarettes Last attempt to quit: 1/1/2008 Years since quitting: 10.8  Smokeless tobacco: Never Used Substance Use Topics  Alcohol use: Yes Comment: 2 times a year  Drug use: No  
 
 
Allergies: 
No Known Allergies Review of Systems Review of Systems Constitutional: Negative for fever. Respiratory: Negative for shortness of breath. Cardiovascular: Negative for chest pain. Gastrointestinal: Negative for diarrhea and vomiting. Skin: Positive for wound (chronic wounds to bilateral lower legs). Neurological: Positive for dizziness. All other systems reviewed and are negative. Physical Exam  
 
Vitals: 11/15/18 1211 11/15/18 1345 11/15/18 1415 11/15/18 1430 BP: (!) 133/96 98/56 111/51 113/54 Pulse: (!) 105 84 87 82 Resp: 19 18 15 18 Temp: 97.9 °F (36.6 °C) SpO2: 100% 95% 96% 97% Weight: (!) 163.3 kg (360 lb) Height: 5' 9\" (1.753 m) Physical Exam  
Constitutional: He is oriented to person, place, and time. He appears well-developed. Morbidly obese HENT:  
Head: Normocephalic and atraumatic. Right Ear: External ear normal.  
Left Ear: External ear normal.  
Nose: Nose normal.  
Mouth/Throat: Oropharynx is clear and moist.  
Eyes: Conjunctivae and EOM are normal. Pupils are equal, round, and reactive to light. Neck: Normal range of motion. Neck supple. No JVD present. No tracheal deviation present. No thyromegaly present. Cardiovascular: Normal rate, regular rhythm, normal heart sounds and intact distal pulses. Exam reveals no gallop and no friction rub. No murmur heard. Pulmonary/Chest: Effort normal and breath sounds normal. No respiratory distress. He has no wheezes. He has no rales. Abdominal: Soft. Bowel sounds are normal. He exhibits no distension and no mass. There is no tenderness. There is no rebound and no guarding. Musculoskeletal: Normal range of motion. He exhibits edema (bilateral lower extremities). Neurological: He is alert and oriented to person, place, and time. He has normal reflexes. No cranial nerve deficit. Skin: Skin is warm and dry. No rash noted. 7 mm linear wound to left calf Psychiatric: He has a normal mood and affect. His behavior is normal.  
Nursing note and vitals reviewed. Diagnostic Study Results Labs - Recent Results (from the past 12 hour(s)) METABOLIC PANEL, BASIC Collection Time: 11/15/18  1:18 PM  
Result Value Ref Range Sodium 139 136 - 145 mmol/L Potassium 4.4 3.5 - 5.5 mmol/L Chloride 104 100 - 108 mmol/L  
 CO2 21 21 - 32 mmol/L Anion gap 14 3.0 - 18 mmol/L Glucose 117 (H) 74 - 99 mg/dL BUN 48 (H) 7.0 - 18 MG/DL Creatinine 1.79 (H) 0.6 - 1.3 MG/DL  
 BUN/Creatinine ratio 27 GFR est AA 51 (L) >60 ml/min/1.73m2 GFR est non-AA 42 (L) >60 ml/min/1.73m2 Calcium 8.4 (L) 8.5 - 10.1 MG/DL  
NT-PRO BNP Collection Time: 11/15/18  1:18 PM  
Result Value Ref Range NT pro-BNP 2,547 (H) 0 - 450 PG/ML  
CBC WITH AUTOMATED DIFF Collection Time: 11/15/18  2:29 PM  
Result Value Ref Range WBC 9.2 4.6 - 13.2 K/uL  
 RBC 3.10 (L) 4.70 - 5.50 M/uL HGB 9.3 (L) 13.0 - 16.0 g/dL HCT 29.9 (L) 36.0 - 48.0 % MCV 96.5 74.0 - 97.0 FL  
 MCH 30.0 24.0 - 34.0 PG  
 MCHC 31.1 31.0 - 37.0 g/dL  
 RDW 15.0 (H) 11.6 - 14.5 % PLATELET 150 (L) 265 - 420 K/uL MPV 8.9 (L) 9.2 - 11.8 FL  
 NEUTROPHILS 86 (H) 40 - 73 % LYMPHOCYTES 6 (L) 21 - 52 % MONOCYTES 8 3 - 10 % EOSINOPHILS 0 0 - 5 % BASOPHILS 0 0 - 2 %  
 ABS. NEUTROPHILS 7.9 1.8 - 8.0 K/UL  
 ABS. LYMPHOCYTES 0.6 (L) 0.9 - 3.6 K/UL  
 ABS. MONOCYTES 0.7 0.05 - 1.2 K/UL  
 ABS. EOSINOPHILS 0.0 0.0 - 0.4 K/UL  
 ABS. BASOPHILS 0.0 0.0 - 0.1 K/UL  
 DF AUTOMATED Radiologic Studies -  
CT Results  (Last 48 hours) None CXR Results  (Last 48 hours)  
          
 11/15/18 1344  XR CHEST PORT Final result Impression:  Impression: No acute findings. Narrative:  CLINICAL: Dyspnea on exertion. Leg edema. COMPARISON: March 19, 2017. A portable view of the chest obtained upright:  
   
The lungs and pleural spaces are clear. The mediastinum is unremarkable in  
appearance. Medications given in the ED- Medications  
sodium chloride 0.9 % bolus infusion 500 mL (0 mL IntraVENous IV Completed 11/15/18 2239) Medical Decision Making I am the first provider for this patient. I reviewed the vital signs, available nursing notes, past medical history, past surgical history, family history and social history. Vital Signs-Reviewed the patient's vital signs. Pulse Oximetry Analysis - 100% on room air Records Reviewed: Nursing Notes, Old Medical Records and Previous Laboratory Studies Provider Notes (Medical Decision Making): Ddx: orthostatic hypotension, dehydration, metabolic, CHF, morbidly obese with sleep apnea. Jorge check labs, give IV fluids. Procedures: 
Procedures ED Course:  
12:45 PM Initial assessment performed. The patients presenting problems have been discussed, and they are in agreement with the care plan formulated and outlined with them. I have encouraged them to ask questions as they arise throughout their visit. 12:57 PM Patient became lightheaded and dizzy upon sitting up in bed. Consistent with orthostatic hypotension. Patient had multiple episodes of hypotension with exertion today. Improved with rest 
 
3:08 PM Discussed patient's history, exam, and available diagnostics results with Justine Taylor MD, internal medicine, who agrees to accept the patient for admission for observation. Diagnosis and Disposition Core Measures: 
For Hospitalized Patients: 
 
1. Hospitalization Decision Time: The decision to hospitalize the patient was made by Alyssa Tyson DO at 3:08 PM on 11/15/2018 2. Aspirin: Aspirin was not given because the patient did not present with a stroke at the time of their Emergency Department evaluation 3:08 PM  Patient is being admitted to the hospital by Justine Taylor MD to Medical. The results of their tests and reasons for their admission have been discussed with them and/or available family. They convey agreement and understanding for the need to be admitted and for their admission diagnosis. CONDITIONS ON ADMISSION: 
Sepsis is not present at the time of admission. Deep Vein Thrombosis is not present at the time of admission. Thrombosis is not present at the time of admission. Pneumonia is not present at the time of admission.  MRSA is not present at the time of admission. Wound infection is not present at the time of admission. Pressure Ulcer is not present at the time of admission. CLINICAL IMPRESSION: 
 
1. Dyspnea on exertion 2. Morbidly obese ([address] 3. Poor conditioning 4. Chronic renal failure, unspecified CKD stage   
  
_______________________________ Attestations: This note is prepared by Brandi Díaz, acting as Scribe for Alondra Sheehan DO. Alondra Sheehan DO:  The scribe's documentation has been prepared under my direction and personally reviewed by me in its entirety. I confirm that the note above accurately reflects all work, treatment, procedures, and medical decision making performed by me. 
_______________________________

## 2018-11-15 NOTE — ROUTINE PROCESS
TRANSFER - IN REPORT: 
 
Verbal report received from Emy Catalan RN(name) on Eusebia Paulino  being received from ED(unit) for routine progression of care Report consisted of patients Situation, Background, Assessment and  
Recommendations(SBAR). Information from the following report(s) SBAR, ED Summary, OR Summary, Procedure Summary, Intake/Output, MAR, Accordion and Recent Results was reviewed with the receiving nurse. Opportunity for questions and clarification was provided. Assessment completed upon patients arrival to unit and care assumed.

## 2018-11-15 NOTE — H&P
History & Physical 
Patient: Pawan Rodriguez MRN: [id number]  CSN: [id number] YOB: 1980  Age: 34 y.o. Sex: male DOA: 11/15/2018 Primary Care Provider:  Breonna Randall MD 
 
 
Assessment/Plan Hospital Problems  Date Reviewed: 4/15/2011 Codes Class Noted POA Orthostatic hypotension ICD-10-CM: I95.1 ICD-9-CM: 458.0  11/15/2018 Unknown * (Principal) Dyspnea on exertion ICD-10-CM: R06.09 
ICD-9-CM: 786.09  11/15/2018 Unknown HAWA (obstructive sleep apnea) ICD-10-CM: H56.14 
ICD-9-CM: 327.23  11/15/2018 Unknown Morbidly obese ([address] ICD-10-CM: E66.01 
ICD-9-CM: 278.01  11/15/2018 Unknown HTN (hypertension) ICD-10-CM: I10 
ICD-9-CM: 401.9  11/15/2018 Unknown Hypothyroidism ICD-10-CM: E03.9 ICD-9-CM: 244.9  11/15/2018 Unknown Stage 3 chronic kidney disease (HCC) ICD-10-CM: N18.3 ICD-9-CM: 585.3  11/15/2018 Unknown Admit to tele Dyspnea on exertion Will have work up Echo to evaluate heart function V/q scan r/o PE Not compliance with cpap Orthostatic hypotension Was given bolus in er Will continue iv hydration HTN Continue home medication  
 
hypothyrodism Will check tsh  
 
hawa on cpap No compliance Would like to try  
 
ckd 3 F/u with dr. Buddy Maloney Full code DVT : heparin,  ppi proph CC: sob HPI:  
 
Pawan Rodriguez is a 34 y.o. male who hx of morbid obesity, ckd, hawa on cpap not compliance, HTN was sent due to hypotension while doing PT. He reported that he has dyspnea on exertion and also hypotension during exercise. He was found orthostatic hypotension. Denies any slurred speech/headache/cp/n/v/blurred vission/d/c/palpitation/gait change/bleeding. Visit Vitals BP 99/54 (BP 1 Location: Left arm, BP Patient Position: At rest;Supine; Head of bed elevated (Comment degrees)) Pulse 92 Temp 98.4 °F (36.9 °C) Resp 14 Ht 5' 9\" (1.753 m) Wt (!) 163.3 kg (360 lb) SpO2 99% BMI 53.16 kg/m² O2 Device: Room air Past Medical History:  
Diagnosis Date  ADD (attention deficit disorder)  Chest tightness 4/14/2011  Chronic kidney disease  Fatigue  Hyperlipidemia  Hypertension  Morbid obesity ([facility] [address]  Palpitation 4/14/2011  Psoriatic arthritis ([facility] [address]  Psychiatric disorder   
 anxiety  SOB (shortness of breath) on exertion 4/14/2011 Past Surgical History:  
Procedure Laterality Date  HX TONSIL AND ADENOIDECTOMY  HX TONSILLECTOMY Family History Problem Relation Age of Onset  Heart Attack Mother 36  
 Heart Attack Father 48  
 Heart Surgery Father 48 Social History Socioeconomic History  Marital status:  Spouse name: Not on file  Number of children: Not on file  Years of education: Not on file  Highest education level: Not on file Social Needs  Financial resource strain: Not on file  Food insecurity - worry: Not on file  Food insecurity - inability: Not on file  Transportation needs - medical: Not on file  Transportation needs - non-medical: Not on file Occupational History  Not on file Tobacco Use  Smoking status: Former Smoker Packs/day: 1.00 Years: 18.00 Pack years: 18.00 Types: Cigarettes Last attempt to quit: 1/1/2008 Years since quitting: 10.8  Smokeless tobacco: Never Used Substance and Sexual Activity  Alcohol use: Yes Comment: 2 times a year  Drug use: No  
 Sexual activity: Not on file Other Topics Concern  Not on file Social History Narrative  Not on file Prior to Admission medications Medication Sig Start Date End Date Taking? Authorizing Provider  
predniSONE (DELTASONE) 20 mg tablet Take 20 mg by mouth three (3) times daily. Yes Tamiko, MD Angeline  
mycophenolate (CELLCEPT) 500 mg tablet Take 1,000 mg by mouth two (2) times a day.    Yes Angeline Morrison MD  
 alfuzosin SR (UROXATRAL) 10 mg SR tablet Take 10 mg by mouth nightly. Yes Other, MD Angeline  
loperamide (IMMODIUM) 2 mg tablet Take 4 mg by mouth as needed for Diarrhea. Yes Provider, Historical  
lisinopril-hydroCHLOROthiazide (PRINZIDE, ZESTORETIC) 20-25 mg per tablet Take 1 Tab by mouth daily (with lunch). Yes Provider, Historical  
magnesium oxide (MAG-OX) 400 mg tablet Take 400 mg by mouth daily (with lunch). Yes Provider, Historical  
levothyroxine (SYNTHROID) 125 mcg tablet Take 150 mcg by mouth nightly. Yes Provider, Historical  
metoprolol tartrate (LOPRESSOR) 100 mg IR tablet Take 200 mg by mouth once. Yes Other, MD Angeline  
potassium chloride SA (MICRO-K) 10 mEq capsule Take 10 mEq by mouth two (2) times a day. Provider, Historical  
[facility]) 625 mg tablet Take 1,875 mg by mouth two (2) times daily (with meals). Provider, Historical  
etanercept (ENBREL) 50 mg/mL (0.98 mL) injection 50 mg by SubCUTAneous route every seven (7) days. 4/14/11   Provider, Historical  
 
 
No Known Allergies Review of Systems Gen: No fever, chills, malaise, weight loss/gain. Heent: No headache, rhinorrhea, epistaxis, ear pain, hearing loss, sinus pain, neck pain/stiffness, sore throat. Heart: No chest pain, palpitations, [place], pnd, or orthopnea. Resp: No cough, hemoptysis, wheezing,  shortness of breath on exertion . GI: No nausea, vomiting, diarrhea, constipation, melena or hematochezia. : No urinary obstruction, dysuria or hematuria. Derm: No rash, new skin lesion or pruritis. Musc/skeletal: no bone or joint complains. Vasc: No edema, cyanosis or claudication. Endo: No heat/cold intolerance, no polyuria,polydipsia or polyphagia. Neuro: No unilateral weakness, numbness, tingling. No seizures. Heme: No easy bruising or bleeding. Physical Exam:  
 
Physical Exam: 
Visit Vitals BP 99/54 (BP 1 Location: Left arm, BP Patient Position:  At rest;Supine; Head of bed elevated (Comment degrees)) Pulse 92 Temp 98.4 °F (36.9 °C) Resp 14 Ht 5' 9\" (1.753 m) Wt (!) 163.3 kg (360 lb) SpO2 99% BMI 53.16 kg/m² O2 Device: Room air Temp (24hrs), Av °F (36.7 °C), Min:97.8 °F (36.6 °C), Max:98.4 °F (36.9 °C) No intake/output data recorded. No intake/output data recorded. General:  Awake, cooperative, no distress. Head:  Normocephalic, without obvious abnormality, atraumatic. Eyes:  Conjunctivae/corneas clear, sclera anicteric, PERRL, EOMs intact. Nose: Nares normal. No drainage or sinus tenderness. Throat: Lips, mucosa, and tongue normal. .  
Neck: Supple, symmetrical, trachea midline, no adenopathy. Lungs:   Clear to auscultation bilaterally. Heart:  Regular rate and rhythm, S1, S2 normal, no murmur, click, rub or gallop. Abdomen: Soft, non-tender. Bowel sounds normal. No masses,  No organomegaly. Extremities: Extremities normal, atraumatic, no cyanosis or edema. Pulses: 2+ and symmetric all extremities. Skin: Skin color-pink, texture, turgor normal. No rashes or lesions. Capillary refill normal   
Neurologic: CNII-XII intact. No focal motor or sensory deficit. Labs Reviewed: 
 
BMP:  
Lab Results Component Value Date/Time  11/15/2018 01:18 PM  
 K 4.4 11/15/2018 01:18 PM  
  11/15/2018 01:18 PM  
 CO2 21 11/15/2018 01:18 PM  
 AGAP 14 11/15/2018 01:18 PM  
  (H) 11/15/2018 01:18 PM  
 BUN 48 (H) 11/15/2018 01:18 PM  
 CREA 1.79 (H) 11/15/2018 01:18 PM  
 GFRAA 51 (L) 11/15/2018 01:18 PM  
 GFRNA 42 (L) 11/15/2018 01:18 PM  
 
CMP:  
Lab Results Component Value Date/Time   11/15/2018 01:18 PM  
 K 4.4 11/15/2018 01:18 PM  
  11/15/2018 01:18 PM  
 CO2 21 11/15/2018 01:18 PM  
 AGAP 14 11/15/2018 01:18 PM  
  (H) 11/15/2018 01:18 PM  
 BUN 48 (H) 11/15/2018 01:18 PM  
 CREA 1.79 (H) 11/15/2018 01:18 PM  
 GFRAA 51 (L) 11/15/2018 01:18 PM  
 GFRNA 42 (L) 11/15/2018 01:18 PM  
 CA 8.4 (L) 11/15/2018 01:18 PM  
 
CBC:  
Lab Results Component Value Date/Time WBC 9.2 11/15/2018 02:29 PM  
 HGB 9.3 (L) 11/15/2018 02:29 PM  
 HCT 29.9 (L) 11/15/2018 02:29 PM  
  (L) 11/15/2018 02:29 PM  
 
All Cardiac Markers in the last 24 hours: No results found for: CPK, CK, CKMMB, CKMB, RCK3, CKMBT, CKNDX, CKND1, NIKA, TROPT, TROIQ, TANNER, TROPT, TNIPOC, BNP, BNPP Recent Glucose Results:  
Lab Results Component Value Date/Time  (H) 11/15/2018 01:18 PM  
 
ABG: No results found for: PH, PHI, PCO2, PCO2I, PO2, PO2I, HCO3, HCO3I, FIO2, FIO2I 
COAGS: No results found for: APTT, PTP, INR Liver Panel: No results found for: ALB, CBIL, TBIL, TP, GLOB, AGRAT, SGOT, ASTPOC, ALTPOC, ALT, GPT, AP Pancreatic Markers: No results found for: AMYLPOCT, AML, LIPPOCT, LPSE Procedures/imaging: see electronic medical records for all procedures/Xrays and details which were not copied into this note but were reviewed prior to creation of Plan Ramírez Linton MD, Internal Medicine CC: Lu Goldmann, MD

## 2018-11-15 NOTE — ED TRIAGE NOTES
Pt arrives to the ER after experiencing a low blood pressure reading on his wrist monitor while doing physical therapy today. Pt reports he was told to come to the ER by is primary care doctor. Pt currently reports no symptoms during triage.

## 2018-11-16 ENCOUNTER — APPOINTMENT (OUTPATIENT)
Dept: NON INVASIVE DIAGNOSTICS | Age: 38
End: 2018-11-16
Attending: HOSPITALIST
Payer: COMMERCIAL

## 2018-11-16 PROBLEM — I31.39 PERICARDIAL EFFUSION: Status: ACTIVE | Noted: 2018-11-16

## 2018-11-16 LAB
AMPHET UR QL SCN: NEGATIVE
ANION GAP SERPL CALC-SCNC: 15 MMOL/L (ref 3–18)
BARBITURATES UR QL SCN: NEGATIVE
BASOPHILS # BLD: 0 K/UL (ref 0–0.1)
BASOPHILS NFR BLD: 0 % (ref 0–2)
BENZODIAZ UR QL: NEGATIVE
BUN SERPL-MCNC: 41 MG/DL (ref 7–18)
BUN/CREAT SERPL: 29
CALCIUM SERPL-MCNC: 8 MG/DL (ref 8.5–10.1)
CANNABINOIDS UR QL SCN: NEGATIVE
CHLORIDE SERPL-SCNC: 107 MMOL/L (ref 100–108)
CO2 SERPL-SCNC: 21 MMOL/L (ref 21–32)
COCAINE UR QL SCN: NEGATIVE
CREAT SERPL-MCNC: 1.4 MG/DL (ref 0.6–1.3)
DIFFERENTIAL METHOD BLD: ABNORMAL
ECHO AO ASC DIAM: 2.85 CM
ECHO AO ROOT DIAM: 2.92 CM
ECHO AV AREA PEAK VELOCITY: 2.6 CM2
ECHO AV AREA VTI: 3 CM2
ECHO AV AREA/BSA PEAK VELOCITY: 0.9 CM2/M2
ECHO AV AREA/BSA VTI: 1.1 CM2/M2
ECHO AV MEAN GRADIENT: 5.7 MMHG
ECHO AV PEAK GRADIENT: 10 MMHG
ECHO AV PEAK VELOCITY: 158.33 CM/S
ECHO AV VTI: 26.26 CM
ECHO LA MAJOR AXIS: 4.15 CM
ECHO LA VOL 2C: 57.76 ML (ref 18–58)
ECHO LA VOL 4C: 65.85 ML (ref 18–58)
ECHO LA VOL BP: 70.74 ML (ref 18–58)
ECHO LA VOL/BSA BIPLANE: 26.83 ML/M2 (ref 16–28)
ECHO LA VOLUME INDEX A2C: 21.91 ML/M2 (ref 16–28)
ECHO LA VOLUME INDEX A4C: 24.98 ML/M2 (ref 16–28)
ECHO LV E' LATERAL VELOCITY: 0.07 CM/S
ECHO LV E' SEPTAL VELOCITY: 0.05 CM/S
ECHO LV EDV A2C: 131.8 ML
ECHO LV EDV A4C: 167.2 ML
ECHO LV EDV BP: 148.9 ML (ref 67–155)
ECHO LV EDV INDEX A4C: 63.4 ML/M2
ECHO LV EDV INDEX BP: 56.5 ML/M2
ECHO LV EDV NDEX A2C: 50 ML/M2
ECHO LV EJECTION FRACTION A2C: 69 %
ECHO LV EJECTION FRACTION A4C: 53 %
ECHO LV EJECTION FRACTION BIPLANE: 61.6 % (ref 55–100)
ECHO LV ESV A2C: 40.3 ML
ECHO LV ESV A4C: 79.4 ML
ECHO LV ESV BP: 57.2 ML (ref 22–58)
ECHO LV ESV INDEX A2C: 15.3 ML/M2
ECHO LV ESV INDEX A4C: 30.1 ML/M2
ECHO LV ESV INDEX BP: 21.7 ML/M2
ECHO LV INTERNAL DIMENSION DIASTOLIC: 5.3 CM (ref 4.2–5.9)
ECHO LV INTERNAL DIMENSION SYSTOLIC: 3.83 CM
ECHO LV IVSD: 1.36 CM (ref 0.6–1)
ECHO LV MASS 2D: 377.2 G (ref 88–224)
ECHO LV MASS INDEX 2D: 143.1 G/M2 (ref 49–115)
ECHO LV POSTERIOR WALL DIASTOLIC: 1.4 CM (ref 0.6–1)
ECHO LVOT DIAM: 2.02 CM
ECHO LVOT PEAK GRADIENT: 6.6 MMHG
ECHO LVOT PEAK VELOCITY: 128.51 CM/S
ECHO LVOT VTI: 24.76 CM
ECHO MV A VELOCITY: 121.08 CM/S
ECHO MV AREA PHT: 4.8 CM2
ECHO MV E DECELERATION TIME (DT): 158.1 MS
ECHO MV E VELOCITY: 0.91 CM/S
ECHO MV E/A RATIO: 0.01
ECHO MV E/E' LATERAL: 13
ECHO MV E/E' RATIO (AVERAGED): 15.6
ECHO MV E/E' SEPTAL: 18.2
ECHO MV PRESSURE HALF TIME (PHT): 45.9 MS
ECHO RA AREA 4C: 14.82 CM2
ECHO RV INTERNAL DIMENSION: 3.38 CM
ECHO RV TAPSE: 3.2 CM (ref 1.5–2)
EOSINOPHIL # BLD: 0 K/UL (ref 0–0.4)
EOSINOPHIL NFR BLD: 0 % (ref 0–5)
ERYTHROCYTE [DISTWIDTH] IN BLOOD BY AUTOMATED COUNT: 14.9 % (ref 11.6–14.5)
GLUCOSE SERPL-MCNC: 115 MG/DL (ref 74–99)
HCT VFR BLD AUTO: 30.4 % (ref 36–48)
HDSCOM,HDSCOM: NORMAL
HGB BLD-MCNC: 9.6 G/DL (ref 13–16)
LYMPHOCYTES # BLD: 0.3 K/UL (ref 0.9–3.6)
LYMPHOCYTES NFR BLD: 4 % (ref 21–52)
MAGNESIUM SERPL-MCNC: 2.4 MG/DL (ref 1.6–2.6)
MCH RBC QN AUTO: 30.4 PG (ref 24–34)
MCHC RBC AUTO-ENTMCNC: 31.6 G/DL (ref 31–37)
MCV RBC AUTO: 96.2 FL (ref 74–97)
METHADONE UR QL: NEGATIVE
MONOCYTES # BLD: 0.5 K/UL (ref 0.05–1.2)
MONOCYTES NFR BLD: 6 % (ref 3–10)
NEUTS SEG # BLD: 7.8 K/UL (ref 1.8–8)
NEUTS SEG NFR BLD: 90 % (ref 40–73)
OPIATES UR QL: NEGATIVE
PCP UR QL: NEGATIVE
PLATELET # BLD AUTO: 127 K/UL (ref 135–420)
PMV BLD AUTO: 9.4 FL (ref 9.2–11.8)
POTASSIUM SERPL-SCNC: 5 MMOL/L (ref 3.5–5.5)
RBC # BLD AUTO: 3.16 M/UL (ref 4.7–5.5)
SODIUM SERPL-SCNC: 143 MMOL/L (ref 136–145)
WBC # BLD AUTO: 8.7 K/UL (ref 4.6–13.2)

## 2018-11-16 PROCEDURE — 83735 ASSAY OF MAGNESIUM: CPT

## 2018-11-16 PROCEDURE — 99218 HC RM OBSERVATION: CPT

## 2018-11-16 PROCEDURE — 85025 COMPLETE CBC W/AUTO DIFF WBC: CPT

## 2018-11-16 PROCEDURE — 74011250637 HC RX REV CODE- 250/637: Performed by: HOSPITALIST

## 2018-11-16 PROCEDURE — 80048 BASIC METABOLIC PNL TOTAL CA: CPT

## 2018-11-16 PROCEDURE — 74011250636 HC RX REV CODE- 250/636: Performed by: HOSPITALIST

## 2018-11-16 PROCEDURE — 36415 COLL VENOUS BLD VENIPUNCTURE: CPT

## 2018-11-16 PROCEDURE — 97116 GAIT TRAINING THERAPY: CPT

## 2018-11-16 PROCEDURE — C8929 TTE W OR WO FOL WCON,DOPPLER: HCPCS

## 2018-11-16 PROCEDURE — 77030011256 HC DRSG MEPILEX <16IN NO BORD MOLN -A

## 2018-11-16 PROCEDURE — 74011636637 HC RX REV CODE- 636/637: Performed by: HOSPITALIST

## 2018-11-16 PROCEDURE — 96372 THER/PROPH/DIAG INJ SC/IM: CPT

## 2018-11-16 PROCEDURE — 80307 DRUG TEST PRSMV CHEM ANLYZR: CPT

## 2018-11-16 PROCEDURE — 97161 PT EVAL LOW COMPLEX 20 MIN: CPT

## 2018-11-16 PROCEDURE — 96361 HYDRATE IV INFUSION ADD-ON: CPT

## 2018-11-16 PROCEDURE — 97530 THERAPEUTIC ACTIVITIES: CPT

## 2018-11-16 RX ORDER — METOPROLOL TARTRATE 25 MG/1
12.5 TABLET, FILM COATED ORAL 2 TIMES DAILY
Status: DISCONTINUED | OUTPATIENT
Start: 2018-11-16 | End: 2018-11-17

## 2018-11-16 RX ADMIN — LEVOTHYROXINE SODIUM 150 MCG: 150 TABLET ORAL at 22:57

## 2018-11-16 RX ADMIN — LOPERAMIDE HYDROCHLORIDE 4 MG: 2 CAPSULE ORAL at 03:08

## 2018-11-16 RX ADMIN — PREDNISONE 20 MG: 20 TABLET ORAL at 16:03

## 2018-11-16 RX ADMIN — MYCOPHENOLATE MOFETIL 1000 MG: 250 CAPSULE ORAL at 03:08

## 2018-11-16 RX ADMIN — HEPARIN SODIUM 5000 UNITS: 5000 INJECTION INTRAVENOUS; SUBCUTANEOUS at 03:08

## 2018-11-16 RX ADMIN — PREDNISONE 20 MG: 20 TABLET ORAL at 22:57

## 2018-11-16 RX ADMIN — PREDNISONE 20 MG: 20 TABLET ORAL at 09:00

## 2018-11-16 RX ADMIN — METOPROLOL TARTRATE 12.5 MG: 25 TABLET ORAL at 22:57

## 2018-11-16 RX ADMIN — ALFUZOSIN HYDROCHLORIDE 10 MG: 10 TABLET ORAL at 22:57

## 2018-11-16 RX ADMIN — LOPERAMIDE HYDROCHLORIDE 4 MG: 2 CAPSULE ORAL at 16:08

## 2018-11-16 RX ADMIN — MYCOPHENOLATE MOFETIL 1000 MG: 250 CAPSULE ORAL at 16:03

## 2018-11-16 RX ADMIN — PERFLUTREN 1 ML: 6.52 INJECTION, SUSPENSION INTRAVENOUS at 09:42

## 2018-11-16 NOTE — PROGRESS NOTES
0730 Assumed responsibility for patient from Jewel Leija RN 
 
0830 Patient mews score 4 
 
0845 Patient in no distress. Manually took vital signs mews to 1 
 
1230 Patient mews at 3. Patient tachy 110. Higher with movement. Will continue to monitor Bedside shift change report given to Jewel Leija RN (oncoming nurse) by Sergio Lopes RN (offgoing nurse). Report included the following information SBAR, Kardex and MAR.

## 2018-11-16 NOTE — PROGRESS NOTES
Reason for Admission:   Zoltan Healy is a 34 y.o. male with PMHX of CKD, sleep apnea who presents to the emergency department C/O hypotension noted on his wrist monitor while he was in PT. Associated sxs include dizziness. States this a recurrent issue with any exertion. He has been in PT for 6 weeks. The patient is on Prednisone for his kidneys for 8 months. The patient has chronic wounds to bilateral lower extremities and is followed by Home Wound Care. He is followed by Brenda Del Rosario MD who instructed the patient to come to the ED for further evaluation. Patient's cardiologist is Lisa Caba MD, and is followed by Yamini Mckeon DO, nephrology He ambulates with a walker. The patient does not use his CPAP, stating he is a stomach sleeper and drools into the mask. He is on Metoprolol. Pt denies fever, shortness of breath, chest pain, vomiting, diarrhea, and any other sxs or complaints patient admitted for dyspnea on exertion RRAT Score:  7 Plan for utilizing home health:    Current with [facility] Likelihood of Readmission:  likely Transition of Care Plan:    Met with patient at bedside along with his wife. Patient reports she assists patient with personal care and meal preparation. Patient presently using [facility] for sn for wound care. And PT they would like to continue to use them  Referral for hhc to be placed when have wound are orders. Patient uses a walker at home. He does not use oxygen. Patient uses cpap home Cm will continue to follow Care Management Interventions PCP Verified by CM: Yes Mode of Transport at Discharge: BLS Transition of Care Consult (CM Consult): Home Health(estiblished with [facility]) [facility] - INPATIENT: No 
Reason Outside [place]: Patient already serviced by other home care/hospice agency Physical Therapy Consult: Yes Occupational Therapy Consult:  Yes 
 Current Support Network: Lives with Spouse Confirm Follow Up Transport: (will need transporation due shortness of breath ) Plan discussed with Pt/Family/Caregiver: Yes Freedom of Choice Offered: Yes Discharge Location Discharge Placement: Home with home health

## 2018-11-16 NOTE — ROUTINE PROCESS
Bedside and Verbal shift change report given to Conor Umana RN (oncoming nurse) by Hernan Barkley RN (offgoing nurse). Report included the following information SBAR, Kardex, Procedure Summary, Intake/Output, MAR and Accordion.

## 2018-11-16 NOTE — PROGRESS NOTES
Shift uneventful. Pt is stable with no signs of distress. Called [facility] and left message. Spoke with respiratory to get pt a hospital cpap in the mean time.

## 2018-11-16 NOTE — PROGRESS NOTES
Problem: Mobility Impaired (Adult and Pediatric) Goal: *Acute Goals and Plan of Care (Insert Text) Physical Therapy Goals Initiated 11/16/2018 and to be accomplished within 3-7 day(s) 1. Patient will move from supine to sit and sit to supine  in bed with supervision/set-up. 2.  Patient will transfer from bed to chair and chair to bed with supervision/set-up using the least restrictive device. 3.  Patient will perform sit to stand with supervision/set-up. 4.  Patient will ambulate with supervision/set-up for 100 feet with the least restrictive device. Outcome: Progressing Towards Goal 
physical Therapy EVALUATION Patient: Cris Laurent (38 y.o. male) Date: 11/16/2018 Primary Diagnosis: Dyspnea on exertion Orthostatic hypotension Morbidly obese ([address] HAWA (obstructive sleep apnea) Precautions:   Fall ASSESSMENT : 
Based on the objective data described below, the patient presents with lower extremity weakness, decreased gait quality and endurance, impaired bed mobility and transfers, and overall limitations in functional mobility. Pt has been deconditioned and was receiving home health PT for strength and conditioning. Pt performed sit to stand with supervision. Patient ambulated 30, 65 feet with RW, GB applied, CG/SBA; pt very SOB and reported mild lightheadedness with amb. With exertion BP dropped to 105/65. HR in 120s throughout session. Patient would benefit from skilled inpatient physical therapy to address deficits, progress as tolerated to achieve long term goals and allow safe discharge. Patient will benefit from skilled intervention to address the above impairments. Patients rehabilitation potential is considered to be Fair Factors which may influence rehabilitation potential include:  
[]         None noted 
[]         Mental ability/status [x]         Medical condition 
[]         Home/family situation and support systems 
[]         Safety awareness []         Pain tolerance/management 
[]         Other: PLAN : 
Recommendations and Planned Interventions: 
[x]           Bed Mobility Training             [x]    Neuromuscular Re-Education 
[x]           Transfer Training                   []    Orthotic/Prosthetic Training 
[x]           Gait Training                          []    Modalities [x]           Therapeutic Exercises          []    Edema Management/Control 
[x]           Therapeutic Activities            [x]    Patient and Family Training/Education 
[]           Other (comment): Frequency/Duration: Patient will be followed by physical therapy 1-2 times per day to address goals. Discharge Recommendations: Home Health Further Equipment Recommendations for Discharge: N/A  
 
SUBJECTIVE:  
Patient stated I don't know how much PT I can do.  OBJECTIVE DATA SUMMARY:  
 
Past Medical History:  
Diagnosis Date  ADD (attention deficit disorder)  Chest tightness 4/14/2011  Chronic kidney disease  Fatigue  Hyperlipidemia  Hypertension  Morbid obesity ([facility]  Palpitation 4/14/2011  Psoriatic arthritis ([facility] [facility]  Psychiatric disorder   
 anxiety  SOB (shortness of breath) on exertion 4/14/2011 Past Surgical History:  
Procedure Laterality Date  HX TONSIL AND ADENOIDECTOMY  HX TONSILLECTOMY Barriers to Learning/Limitations: None Compensate with: Visual Cues, Verbal Cues and Tactile Cues Prior Level of Function/Home Situation: Independent amb for short distances with RW Home Situation Home Environment: Apartment # Steps to Enter: 0 One/Two Story Residence: One story Living Alone: No 
Support Systems: Spouse/Significant Other/Partner Patient Expects to be [facility] Used/Available at Home: Walker, rollingStrength:   
Strength: Generally decreased, functional 
Tone & Sensation:  
Tone: Normal 
Sensation: Impaired Range Of Motion: AROM: Generally decreased, functional 
PROM: Generally decreased, functional 
Functional Mobility: 
Transfers: 
Sit to Stand: Supervision Stand to Sit: Supervision Balance:  
Sitting: Intact Standing: Intact; With supportAmbulation/Gait Training: 
Distance (ft): 65 Feet (ft) Assistive Device: Gait belt;Walker, rolling Ambulation - Level of Assistance: Contact guard assistance;Stand-by assistance Gait Description (WDL): Exceptions to [facility] Gait Abnormalities: Decreased step clearance;Shuffling gait Base of Support: Widened Stance: Weight shift Speed/Mikaela: Slow;Shuffled Step Length: Right shortened;Left shortened Pain: 
Pain Scale 1: Numeric (0 - 10) Pain Intensity 1: 0 Activity Tolerance:  
Fair Please refer to the flowsheet for vital signs taken during this treatment. After treatment:  
[]         Patient left in no apparent distress sitting up in chair 
[x]         Patient left in no apparent distress in bed 
[x]         Call bell left within reach [x]         Nursing notified 
[]         Caregiver present 
[]         Bed alarm activated COMMUNICATION/EDUCATION:  
[x]         Fall prevention education was provided and the patient/caregiver indicated understanding. [x]         Patient/family have participated as able in goal setting and plan of care. [x]         Patient/family agree to work toward stated goals and plan of care. []         Patient understands intent and goals of therapy, but is neutral about his/her participation. []         Patient is unable to participate in goal setting and plan of care. Thank you for this referral. 
Jennie Peres Time Calculation: 47 mins Physical Therapy Goals Eval Complexity: History: MEDIUM  Complexity : 1-2 comorbidities / personal factors will impact the outcome/ POC Exam:LOW Complexity : 1-2 Standardized tests and measures addressing body structure, function, activity limitation and / or participation in recreation  Presentation: MEDIUM Complexity : Evolving with changing characteristics  Clinical Decision Making:Low Complexity amb >30 ft c/RW, CGA for safety Overall Complexity:LOW  Mobility  Current  CJ= 20-39%   Goal  CI= 1-19%. The severity rating is based on the Level of Assistance required for Functional Mobility and ADLs.

## 2018-11-16 NOTE — ROUTINE PROCESS
Bedside and Verbal shift change report given to Chucho Tomlinson RN (oncoming nurse) by Desirae Browning RN 
 (offgoing nurse). Report included the following information SBAR, Kardex, Intake/Output, MAR, Recent Results and Med Rec Status.

## 2018-11-16 NOTE — PROGRESS NOTES
Hospitalist Progress Note-critical care note Patient: Merline Vyas MRN: [id number]  CSN: [id number] YOB: 1980  Age: 34 y.o. Sex: male DOA: 11/15/2018 LOS:  LOS: 0 days Chief complaint: pericardial effusion,  Orthostatic hypotension, ckd3 Assessment/Plan Hospital Problems  Date Reviewed: 4/15/2011 Codes Class Noted POA Pericardial effusion ICD-10-CM: I31.3 ICD-9-CM: 423.9  11/16/2018 Unknown Orthostatic hypotension ICD-10-CM: I95.1 ICD-9-CM: 458.0  11/15/2018 Unknown * (Principal) Dyspnea on exertion ICD-10-CM: R06.09 
ICD-9-CM: 786.09  11/15/2018 Unknown CORI (obstructive sleep apnea) ICD-10-CM: J20.63 
ICD-9-CM: 327.23  11/15/2018 Unknown Morbidly obese ([address] ICD-10-CM: E66.01 
ICD-9-CM: 278.01  11/15/2018 Unknown HTN (hypertension) ICD-10-CM: I10 
ICD-9-CM: 401.9  11/15/2018 Unknown Hypothyroidism ICD-10-CM: E03.9 ICD-9-CM: 244.9  11/15/2018 Unknown Stage 3 chronic kidney disease (HCC) ICD-10-CM: N18.3 ICD-9-CM: 585.3  11/15/2018 Unknown Dyspnea on exertion Stable V/q scan ;  PE  has been ruled out. Not compliance with cpap Pericardial effusion Echo done with small pericardia effusion-case discussed with Dr. Corinna Claros and wife  
  
Orthostatic hypotension Resolved per iv infusion, work with PT today.  
  
  
HTN Will restart metoprolol with low dose  
  
hypothyrodism Will check tsh  
  
cori on cpap No compliance Tolerate well last week  
  
ckd 3 Cr improving. F/u with dr. Llanes Beverage Wife was at the bedside Nurse: not taking some medication at home Disposition :1-2 days Will consult cardiology for pericardial effusion. Review of systems: 
 
General: No fevers or chills. Cardiovascular: No chest pain or pressure. No palpitations. Pulmonary: No shortness of breath. Gastrointestinal: No nausea, vomiting. Vital signs/Intake and Output: 
Visit Vitals /60 (BP 1 Location: Left arm, BP Patient Position: At rest;Standing) Pulse (!) 117 Temp 97.9 °F (36.6 °C) Resp 18 Ht 5' 9\" (1.753 m) Wt (!) 161 kg (355 lb) SpO2 98% BMI 52.42 kg/m² Current Shift:  11/16 0701 - 11/16 1900 In: 240 [P.O.:240] Out: - Last three shifts:  11/14 1901 - 11/16 0700 In: 360 [P.O.:360] Out: 1200 [Urine:1200] Physical Exam: 
General: WD, WN. Alert, cooperative, no acute distress   
HEENT: NC, Atraumatic. PERRLA, anicteric sclerae. Lungs: CTA Bilaterally. No Wheezing/Rhonchi/Rales. Heart:  Regular  rhythm,  No murmur, No Rubs, No Gallops Abdomen: Soft, Non distended, Non tender.  +Bowel sounds, Extremities: No c/c/e Psych:   Not anxious or agitated. Neurologic:  No acute neurological deficit. Labs: Results:  
   
Chemistry Recent Labs 11/16/18 
0600 11/15/18 
1318 * 117*  139  
K 5.0 4.4  104 CO2 21 21 BUN 41* 48* CREA 1.40* 1.79* CA 8.0* 8.4* AGAP 15 14 BUCR 29 27 CBC w/Diff Recent Labs 11/16/18 
0755 11/15/18 
1429 WBC 8.7 9.2  
RBC 3.16* 3.10* HGB 9.6* 9.3* HCT 30.4* 29.9*  
* 123* GRANS 90* 86* LYMPH 4* 6*  
EOS 0 0 Cardiac Enzymes No results for input(s): CPK, CKND1, NIKA in the last 72 hours. No lab exists for component: Ilan Dorset Coagulation No results for input(s): PTP, INR, APTT in the last 72 hours. No lab exists for component: INREXT, INREXT Lipid Panel No results found for: CHOL, CHOLPOCT, CHOLX, CHLST, CHOLV, 839048, HDL, LDL, LDLC, DLDLP, 794577, VLDLC, VLDL, TGLX, TRIGL, TRIGP, TGLPOCT, CHHD, CHHDX  
BNP No results for input(s): BNPP in the last 72 hours. Liver Enzymes No results for input(s): TP, ALB, TBIL, AP, SGOT, GPT in the last 72 hours. No lab exists for component: DBIL Thyroid Studies No results found for: T4, T3U, TSH, TSHEXT, TSHEXT Procedures/imaging: see electronic medical records for all procedures/Xrays and details which were not copied into this note but were reviewed prior to creation of Plan No Wright MD

## 2018-11-17 VITALS
WEIGHT: 315 LBS | OXYGEN SATURATION: 96 % | DIASTOLIC BLOOD PRESSURE: 64 MMHG | HEART RATE: 130 BPM | TEMPERATURE: 97.9 F | RESPIRATION RATE: 18 BRPM | SYSTOLIC BLOOD PRESSURE: 110 MMHG | BODY MASS INDEX: 46.65 KG/M2 | HEIGHT: 69 IN

## 2018-11-17 LAB
ANION GAP SERPL CALC-SCNC: 13 MMOL/L (ref 3–18)
BASOPHILS # BLD: 0 K/UL (ref 0–0.1)
BASOPHILS NFR BLD: 0 % (ref 0–2)
BUN SERPL-MCNC: 41 MG/DL (ref 7–18)
BUN/CREAT SERPL: 24
CALCIUM SERPL-MCNC: 7.9 MG/DL (ref 8.5–10.1)
CHLORIDE SERPL-SCNC: 107 MMOL/L (ref 100–108)
CO2 SERPL-SCNC: 22 MMOL/L (ref 21–32)
CREAT SERPL-MCNC: 1.73 MG/DL (ref 0.6–1.3)
DIFFERENTIAL METHOD BLD: ABNORMAL
EOSINOPHIL # BLD: 0 K/UL (ref 0–0.4)
EOSINOPHIL NFR BLD: 0 % (ref 0–5)
ERYTHROCYTE [DISTWIDTH] IN BLOOD BY AUTOMATED COUNT: 14.9 % (ref 11.6–14.5)
GLUCOSE SERPL-MCNC: 180 MG/DL (ref 74–99)
HCT VFR BLD AUTO: 28.5 % (ref 36–48)
HGB BLD-MCNC: 8.9 G/DL (ref 13–16)
LYMPHOCYTES # BLD: 0.3 K/UL (ref 0.9–3.6)
LYMPHOCYTES NFR BLD: 4 % (ref 21–52)
MAGNESIUM SERPL-MCNC: 2.3 MG/DL (ref 1.6–2.6)
MCH RBC QN AUTO: 30.3 PG (ref 24–34)
MCHC RBC AUTO-ENTMCNC: 31.2 G/DL (ref 31–37)
MCV RBC AUTO: 96.9 FL (ref 74–97)
MONOCYTES # BLD: 0.4 K/UL (ref 0.05–1.2)
MONOCYTES NFR BLD: 5 % (ref 3–10)
NEUTS SEG # BLD: 7.6 K/UL (ref 1.8–8)
NEUTS SEG NFR BLD: 91 % (ref 40–73)
PLATELET # BLD AUTO: 123 K/UL (ref 135–420)
PMV BLD AUTO: 8.8 FL (ref 9.2–11.8)
POTASSIUM SERPL-SCNC: 4.7 MMOL/L (ref 3.5–5.5)
RBC # BLD AUTO: 2.94 M/UL (ref 4.7–5.5)
SODIUM SERPL-SCNC: 142 MMOL/L (ref 136–145)
TSH SERPL DL<=0.05 MIU/L-ACNC: <0.01 UIU/ML (ref 0.36–3.74)
WBC # BLD AUTO: 8.4 K/UL (ref 4.6–13.2)

## 2018-11-17 PROCEDURE — 77030037875 HC DRSG MEPILEX <16IN BORD MOLN -A

## 2018-11-17 PROCEDURE — 80048 BASIC METABOLIC PNL TOTAL CA: CPT

## 2018-11-17 PROCEDURE — 77030011256 HC DRSG MEPILEX <16IN NO BORD MOLN -A

## 2018-11-17 PROCEDURE — 36415 COLL VENOUS BLD VENIPUNCTURE: CPT

## 2018-11-17 PROCEDURE — 74011250636 HC RX REV CODE- 250/636: Performed by: HOSPITALIST

## 2018-11-17 PROCEDURE — 83735 ASSAY OF MAGNESIUM: CPT

## 2018-11-17 PROCEDURE — 85025 COMPLETE CBC W/AUTO DIFF WBC: CPT

## 2018-11-17 PROCEDURE — 74011250637 HC RX REV CODE- 250/637: Performed by: INTERNAL MEDICINE

## 2018-11-17 PROCEDURE — 74011250637 HC RX REV CODE- 250/637: Performed by: HOSPITALIST

## 2018-11-17 PROCEDURE — 84443 ASSAY THYROID STIM HORMONE: CPT

## 2018-11-17 PROCEDURE — 90686 IIV4 VACC NO PRSV 0.5 ML IM: CPT | Performed by: HOSPITALIST

## 2018-11-17 PROCEDURE — 90471 IMMUNIZATION ADMIN: CPT

## 2018-11-17 PROCEDURE — 99218 HC RM OBSERVATION: CPT

## 2018-11-17 PROCEDURE — 74011636637 HC RX REV CODE- 636/637: Performed by: HOSPITALIST

## 2018-11-17 RX ORDER — METOPROLOL TARTRATE 100 MG/1
50 TABLET ORAL ONCE
Qty: 30 TAB | Refills: 0 | Status: SHIPPED | OUTPATIENT
Start: 2018-11-17 | End: 2018-11-17

## 2018-11-17 RX ORDER — METOPROLOL TARTRATE 50 MG/1
50 TABLET ORAL 2 TIMES DAILY
Status: DISCONTINUED | OUTPATIENT
Start: 2018-11-17 | End: 2018-11-17 | Stop reason: HOSPADM

## 2018-11-17 RX ORDER — HYDROCHLOROTHIAZIDE 12.5 MG/1
12.5 CAPSULE ORAL DAILY
Qty: 30 CAP | Refills: 0 | Status: ON HOLD | OUTPATIENT
Start: 2018-11-17 | End: 2018-12-27

## 2018-11-17 RX ADMIN — INFLUENZA VIRUS VACCINE 0.5 ML: 15; 15; 15; 15 SUSPENSION INTRAMUSCULAR at 10:42

## 2018-11-17 RX ADMIN — PREDNISONE 20 MG: 20 TABLET ORAL at 08:37

## 2018-11-17 RX ADMIN — METOPROLOL TARTRATE 50 MG: 50 TABLET ORAL at 08:37

## 2018-11-17 RX ADMIN — MYCOPHENOLATE MOFETIL 1000 MG: 250 CAPSULE ORAL at 03:28

## 2018-11-17 RX ADMIN — LOPERAMIDE HYDROCHLORIDE 4 MG: 2 CAPSULE ORAL at 03:28

## 2018-11-17 NOTE — DISCHARGE SUMMARY
Discharge Summary Subjective:  
 
 
Admit Date: 11/15/2018 Discharge Date:  11/17/2018, 2:03 PM 
 
Discharging Physician: Haylee Day pager [phone] Primary Care Provider:  Robel Alvarez MD 
 
Patient ID: 
Jaelyn Wells 34 y.o. male 1980 Reason for Admission:  Dyspnea on exertion Orthostatic hypotension Morbidly obese ([address] HAWA (obstructive sleep apnea) Discharge Diagnosis:  
- dyspnea on exertion 
- sinus tachycardia 
- hypothyroidism 
- CKD  
- nephrotic syndrome -  Deconditioning Patient Active Problem List  
Diagnosis Code  Fatigue R53.83  
 Psoriatic arthritis (HCC) L40.50  Chest tightness R07.89  
 SOB (shortness of breath) on exertion R06.02  
 Palpitation R00.2  Orthostatic hypotension I95.1  Dyspnea on exertion R06.09  
 HAWA (obstructive sleep apnea) G47.33  
 Morbidly obese (HCC) E66.01  
 HTN (hypertension) I10  
 Hypothyroidism E03.9  Stage 3 chronic kidney disease (HCC) N18.3  Pericardial effusion I31.3 Consultants:   
cardiology Hospital Course:  
Reason for admission ( Admitting HPI): \"  
Jaelyn Wells is a 34 y.o. male who hx of morbid obesity, ckd, hawa on cpap not compliance, HTN was sent due to hypotension while doing PT. He reported that he has dyspnea on exertion and also hypotension during exercise. He was found orthostatic hypotension.  
  
Denies any slurred speech/headache/cp/n/v/blurred vission/d/c/palpitation/gait change/bleeding. \" Mr Janna Proctor was admitted to the hospitalist service. He note that prior to this he had spent months bed bound prior to this event and was working w PT w noted hypotension. He has no significnat hypotension noted, but did have intermittent sinus tachycardia. Work up included VQ scan which was low probability for PE, 2d echo w tace pericardial effusion.  He is currently with out light headedness, dyspnea, chest pain or palpitations and stable for outpatient follow up. His metoprolol ws initially stopped but restarted at 50mg. Pertinent Imaging/ Operative procedures: CXR: nothing acute VQ scan: low probability for PE 
2d echo:\" · Technically difficult study due to patient's body habitus. Definity contrast was given to enhance imaging. · Estimated left ventricular ejection fraction is 56 - 60%. Left ventricular moderate concentric hypertrophy. Normal left ventricular wall motion, no regional wall motion abnormality noted. Age-appropriate left ventricular diastolic function N/O'=48. 
· Mild mitral valve regurgitation. · Trivial pericardial effusion. No indications of tamponade present. \" Pertinent Results:   
CMP:  
Lab Results Component Value Date/Time  11/17/2018 04:00 AM  
 K 4.7 11/17/2018 04:00 AM  
  11/17/2018 04:00 AM  
 CO2 22 11/17/2018 04:00 AM  
 AGAP 13 11/17/2018 04:00 AM  
  (H) 11/17/2018 04:00 AM  
 BUN 41 (H) 11/17/2018 04:00 AM  
 CREA 1.73 (H) 11/17/2018 04:00 AM  
 GFRAA 53 (L) 11/17/2018 04:00 AM  
 GFRNA 44 (L) 11/17/2018 04:00 AM  
 CA 7.9 (L) 11/17/2018 04:00 AM  
 MG 2.3 11/17/2018 04:00 AM  
 
CBC:  
Lab Results Component Value Date/Time WBC 8.4 11/17/2018 04:00 AM  
 HGB 8.9 (L) 11/17/2018 04:00 AM  
 HCT 28.5 (L) 11/17/2018 04:00 AM  
  (L) 11/17/2018 04:00 AM  
 
 
 
Physical Exam on Discharge: 
Visit Vitals /64 (BP 1 Location: Left arm, BP Patient Position: At rest) Pulse (!) 130 Temp 97.9 °F (36.6 °C) Resp 18 Ht 5' 9\" (1.753 m) Wt (!) 161.4 kg (355 lb 14.4 oz) SpO2 96% BMI 52.56 kg/m² Body mass index is 52.56 kg/m². GEN: NAD HEART: S1 S2 
NEURO: nonfocal  
Condition at discharge: stable Discharge Medications Current Discharge Medication List  
  
START taking these medications Details  
hydroCHLOROthiazide (MICROZIDE) 12.5 mg capsule Take 1 Cap by mouth daily. Qty: 30 Cap, Refills: 0 CONTINUE these medications which have CHANGED Details  
metoprolol tartrate (LOPRESSOR) 100 mg IR tablet Take 0.5 Tabs by mouth once for 1 dose. Qty: 30 Tab, Refills: 0 CONTINUE these medications which have NOT CHANGED Details  
predniSONE (DELTASONE) 20 mg tablet Take 20 mg by mouth three (3) times daily. mycophenolate (CELLCEPT) 500 mg tablet Take 1,000 mg by mouth two (2) times a day. alfuzosin SR (UROXATRAL) 10 mg SR tablet Take 10 mg by mouth nightly. loperamide (IMMODIUM) 2 mg tablet Take 4 mg by mouth as needed for Diarrhea.  
  
magnesium oxide (MAG-OX) 400 mg tablet Take 400 mg by mouth daily (with lunch). levothyroxine (SYNTHROID) 125 mcg tablet Take 150 mcg by mouth nightly. potassium chloride SA (MICRO-K) 10 mEq capsule Take 10 mEq by mouth two (2) times a day. [facility]) 625 mg tablet Take 1,875 mg by mouth two (2) times daily (with meals). etanercept (ENBREL) 50 mg/mL (0.98 mL) injection 50 mg by SubCUTAneous route every seven (7) days. STOP taking these medications  
  
 lisinopril-hydroCHLOROthiazide (PRINZIDE, ZESTORETIC) 20-25 mg per tablet Comments:  
Reason for Stopping:   
   
  
 
 
Disposition: home Follow up:  pcp Restrictions: none Diagnostic Imaging/Labs pending at discharge: none Dauna Denver, DO [address] [facility] Internal Medicine/Geriatrics Time Spent 45 minutes with >50% coordination of care CC: Nicol Villela MD

## 2018-11-17 NOTE — ROUTINE PROCESS
Bedside and Verbal shift change report given to PALOMO Hyde (oncoming nurse) by Kaushik Medina (offgoing nurse). Report included the following information SBAR, Kardex, Intake/Output and MAR.

## 2018-11-17 NOTE — ROUTINE PROCESS
2449 assumed care of pt after bedside verbal report was given by off going nurse, pt awake, sitting up on the side of the bed, sps at bedside, no acute distress noted, pt denies c/o pain or shortness of breath, call bell, water and pt's belongings at his bedside, will continue to monitor 1411 pt discharged to home, discharged instructions given to pt and sps on medications, follow up appointments, activity, opportunity given for questions AVS verified with Red President RN

## 2018-11-17 NOTE — PROGRESS NOTES
Transition of care plan:  Discharge home with MD follow up, family assistance and Gabino Chamberlain Chart reviewed. Pt admitted in obs status for dyspnea. Oral and Written notification given to patient and/or caregiver informing them that they are currently an Outpatient receiving care in our facility. Outpatient services include Observation Services under [place] and [place] requirements. Met with pt and his wife at bedside. Pt active with [facility]. FOC offered and pt would like to cont with [facility]. Referral will be placed. Pt needs medical  Transport home. Unit secretary Marlon Javon has arranged transport. Unit   unable to get auth for medical transport. Pt and pts wife have talked with insurance company and aware pt does not have medical transport benefit and still state they need medical transport home for safe transition. Pt states he is unable to ambulate to door and therefore cannot use a Lyft. No other dc concerns identified. CM will cont to follow. Care Management Interventions PCP Verified by CM: Yes Mode of Transport at Discharge: BLS Transition of Care Consult (CM Consult): Home Health(estibl[facility] with [facility] [place] - INPATIENT: No 
Reason Outside [place]: Patient already serviced by other home care/hospice agency Physical Therapy Consult: Yes Occupational Therapy Consult: Yes Current Support Network: Lives with Spouse Confirm Follow Up Transport: (will need transporation due shortness of breath ) Plan discussed with Pt/Family/Caregiver: Yes Freedom of Choice Offered: Yes Discharge Location Discharge Placement: Home with home health

## 2018-11-17 NOTE — PROGRESS NOTES
[facility] Reassessed patient and agree with prior assessment. Patient denied pain or discomfort overnight. Heart rate elevated 103-104. Patient Vitals for the past 12 hrs: 
 Temp Pulse Resp BP SpO2  
11/17/18 0332 97.8 °F (36.6 °C) (!) 104 18 137/69 94 % 11/17/18 0008 97.9 °F (36.6 °C) (!) 103 18 142/68 97 % 11/16/18 2050 97.9 °F (36.6 °C) (!) 101 18 134/67 96 %  
 
 
0806 - Dressing to LLE x 2 changed, due to weeping drainage from BLE edema. Pin hole sized wheal to right thigh without redness or swelling. Pin hole sized wheal to right calf, pink without swelling. Educated patient on consult for wound care. Patient agrees. Peggy Herr assuming care of patient made aware and will address wound care

## 2018-11-17 NOTE — CONSULTS
[facility] Consult Note Patient: Jacques Rojas MRN: [id number]  SSN: [id number] YOB: 1980  Age: 34 y.o. Sex: male Date of Consultation: 11/16/2018 Referring Physician: Dr. Mu Figueroa 
Reason for Consultation: pericardial effusion ( trivial) and dyspnea on exertion. HPI: 
I was asked by Dr. Mu Figueroa to see this pleasant patient for minimal pericardial effusion and dyspnea on exertion. Aby Clemente is a 47-year-old gentleman with a history of morbid obesity, history of kidney disease, nephrotic range proteinuria has been on and off on prednisone 60 mg daily, hypertension. Patient came to the hospital, underwent echocardiogram that have shown  Minimal pericardial effusion. normal LV systolic function. No hemodynamic compromise due to minimal pericardial effusion. Patient's VQ scan was negative for pulmonary embolism. Patient's underwent stress echocardiography 2017 that was normal. 
Patient is on lisinopril and metoprolol at home. Past Medical History:  
Diagnosis Date  ADD (attention deficit disorder)  Chest tightness 4/14/2011  Chronic kidney disease  Fatigue  Hyperlipidemia  Hypertension  Morbid obesity ([facility] [address]  Palpitation 4/14/2011  Psoriatic arthritis ([facility] [address]  Psychiatric disorder   
 anxiety  SOB (shortness of breath) on exertion 4/14/2011 Past Surgical History:  
Procedure Laterality Date  HX TONSIL AND ADENOIDECTOMY  HX TONSILLECTOMY Current Facility-Administered Medications Medication Dose Route Frequency  metoprolol tartrate (LOPRESSOR) tablet 12.5 mg  12.5 mg Oral BID  influenza vaccine 2018-19 (6 mos+)(PF) (FLUARIX QUAD/FLULAVAL QUAD) injection 0.5 mL  0.5 mL IntraMUSCular PRIOR TO DISCHARGE  
 0.9% sodium chloride infusion  100 mL/hr IntraVENous CONTINUOUS  
 acetaminophen (TYLENOL) tablet 650 mg  650 mg Oral Q4H PRN  
 naloxone (NARCAN) injection 0.4 mg  0.4 mg IntraVENous PRN  
  diphenhydrAMINE (BENADRYL) injection 12.5 mg  12.5 mg IntraVENous Q4H PRN  
 ondansetron (ZOFRAN) injection 4 mg  4 mg IntraVENous Q4H PRN  
 heparin (porcine) injection 5,000 Units  5,000 Units SubCUTAneous Q8H  
 levothyroxine (SYNTHROID) tablet 150 mcg  150 mcg Oral QHS  predniSONE (DELTASONE) tablet 20 mg  20 mg Oral TID  alfuzosin SR (UROXATRAL) tablet 10 mg  10 mg Oral QHS  loperamide (IMODIUM) capsule 4 mg  4 mg Oral PRN  
 mycophenolate mofetil (CELLCEPT) capsule 1,000 mg  1,000 mg Oral BID Allergies and Intolerances:  
No Known Allergies Family History:  
Family History Problem Relation Age of Onset  Heart Attack Mother 36  
 Heart Attack Father 48  
 Heart Surgery Father 48 Social History: He  reports that he quit smoking about 10 years ago. His smoking use included cigarettes. He has a 18.00 pack-year smoking history. he has never used smokeless tobacco.  He  reports that he drinks alcohol. Review of Systems:  
 
Review of Systems Gen: No fever, chills, malaise, weight loss/gain. Heent: No headache, rhinorrhea, epistaxis, ear pain, hearing loss, sinus pain, neck pain/stiffness, sore throat. Heart: No chest pain, palpitations, +PHOENIX, pnd, or orthopnea. Resp: No cough, hemoptysis, wheezing and shortness of breath. GI: No nausea, vomiting, diarrhea, constipation, melena or hematochezia. : No urinary obstruction, dysuria or hematuria. Derm: No rash, new skin lesion or pruritis. Musc/skeletal: no bone or joint complains. Vasc: ++ edema, cyanosis or claudication. Endo: No heat/cold intolerance, no polyuria,polydipsia or polyphagia. Neuro: No unilateral weakness, numbness, tingling. No seizures. Heme: No easy bruising or bleeding. Physical:  
Patient Vitals for the past 6 hrs: 
 Temp Pulse Resp BP SpO2  
11/16/18 1543 97.9 °F (36.6 °C) (!) 117 18 104/60 98 % Exam: General Appearance: morbidly obese gentleman. Comfortable, not using accessory muscles of respiration. cushingoid skin disorder consistent with chronic steroid intake. HEENT: T[place]. HEAD: Atraumatic NECK: No JVD, no thyroidomeglay. CAROTIDS: clear LUNGS: Clear bilaterally. HEART: S1+S2 ABD: Non-tender, BS Audible EXT: ++ edema, and no cysnosis. VASCULAR EXAM: Pulses are intact. MUSCULOSKELETAL: Grossly no joint deformity. NEUROLOGICAL: Motor and sensory sytem intact and Cranial nerves II-XII intact. Review of Data:  
LABS:  
Lab Results Component Value Date/Time WBC 8.7 11/16/2018 07:55 AM  
 HGB 9.6 (L) 11/16/2018 07:55 AM  
 HCT 30.4 (L) 11/16/2018 07:55 AM  
 PLATELET 767 (L) 17/32/4035 07:55 AM  
 
Lab Results Component Value Date/Time Sodium 143 11/16/2018 06:00 AM  
 Potassium 5.0 11/16/2018 06:00 AM  
 Chloride 107 11/16/2018 06:00 AM  
 CO2 21 11/16/2018 06:00 AM  
 Glucose 115 (H) 11/16/2018 06:00 AM  
 BUN 41 (H) 11/16/2018 06:00 AM  
 Creatinine 1.40 (H) 11/16/2018 06:00 AM  
 
No results found for: CHOL, CHOLX, CHLST, CHOLV, HDL, LDL, LDLC, DLDLP, TGLX, TRIGL, TRIGP No results found for: GPT No results found for: HBA1C, HGBE8, WON8CGZH, AHA7NRJJ Cardiology Procedures:  
Results for orders placed or performed during the hospital encounter of 03/19/17 EKG, 12 LEAD, INITIAL Result Value Ref Range Ventricular Rate 76 BPM  
 Atrial Rate 76 BPM  
 P-R Interval 126 ms  
 QRS Duration 92 ms Q-T Interval 372 ms QTC Calculation (Bezet) 418 ms Calculated P Axis 44 degrees Calculated R Axis 71 degrees Calculated T Axis 86 degrees Diagnosis Normal sinus rhythm Normal ECG Confirmed by So Sibley MD, Daksha Munoz ([id number]) on 3/19/2017 11:38:29 PM 
  
 
 
 
 
Impression / Plan:   
Patient Active Problem List  
Diagnosis Code  Fatigue R53.83  
 Psoriatic arthritis (HCC) L40.50  Chest tightness R07.89  
 SOB (shortness of breath) on exertion R06.02  
  Palpitation R00.2  Orthostatic hypotension I95.1  Dyspnea on exertion R06.09  
 HAWA (obstructive sleep apnea) G47.33  
 Morbidly obese (HCC) E66.01  
 HTN (hypertension) I10  
 Hypothyroidism E03.9  Stage 3 chronic kidney disease (HCC) N18.3  Pericardial effusion I31.3 Assessment and plan #1 dyspnea on exertion multifactorial in etiology. #2 minimal pericardial effusion without Cardiac tamponade clinically or echocardiographically #3 hypertension #4 chronic kidney diseasenephrotic range proteinuria history- following with Dr. Rosa Maria Negron. #5 now hypotensive. Plan Patient's ejection fraction is normal. Patient previous stress echocardiogram 2017 was normal 
This point the minimal pericardial effusion is is very very small without hemodynamic compromise Adjustment of antihypertensive blood  According to his routine blood pressure numbers Continue prednisone as per nephrologist Dr. Rosa Maria Negron Patient's lisinopril and metoprolol will be adjusted according to blood pressure Patient will continue to follow Dr. Rosa Maria Negron from nephrologist and with Dr. Mya Concepcion from cardiology standpoint. Management plan was discussed with the patient and wife Signed By: Monique Bishop MD   
 November 16, 2018

## 2018-11-17 NOTE — DISCHARGE INSTRUCTIONS
DISCHARGE SUMMARY from Nurse    PATIENT INSTRUCTIONS:    After general anesthesia or intravenous sedation, for 24 hours or while taking prescription Narcotics:  · Limit your activities  · Do not drive and operate hazardous machinery  · Do not make important personal or business decisions  · Do  not drink alcoholic beverages  · If you have not urinated within 8 hours after discharge, please contact your surgeon on call. Report the following to your surgeon:  · Excessive pain, swelling, redness or odor of or around the surgical area  · Temperature over 100.5  · Nausea and vomiting lasting longer than 4 hours or if unable to take medications  · Any signs of decreased circulation or nerve impairment to extremity: change in color, persistent  numbness, tingling, coldness or increase pain  · Any questions    What to do at Home:  Recommended activity: Activity as tolerated. If you experience any of the following symptoms chest pain, increased shortness of breath, changes in mental status, increased weakness/numbness please call 911. *  Please give a list of your current medications to your Primary Care Provider. *  Please update this list whenever your medications are discontinued, doses are      changed, or new medications (including over-the-counter products) are added. *  Please carry medication information at all times in case of emergency situations. These are general instructions for a healthy lifestyle:    No smoking/ No tobacco products/ Avoid exposure to second hand smoke  Surgeon General's Warning:  Quitting smoking now greatly reduces serious risk to your health.     Obesity, smoking, and sedentary lifestyle greatly increases your risk for illness    A healthy diet, regular physical exercise & weight monitoring are important for maintaining a healthy lifestyle    You may be retaining fluid if you have a history of heart failure or if you experience any of the following symptoms:  Weight gain of 3 pounds or more overnight or 5 pounds in a week, increased swelling in our hands or feet or shortness of breath while lying flat in bed. Please call your doctor as soon as you notice any of these symptoms; do not wait until your next office visit. Recognize signs and symptoms of STROKE:    F-face looks uneven    A-arms unable to move or move unevenly    S-speech slurred or non-existent    T-time-call 911 as soon as signs and symptoms begin-DO NOT go       Back to bed or wait to see if you get better-TIME IS BRAIN. Warning Signs of HEART ATTACK     Call 911 if you have these symptoms:   Chest discomfort. Most heart attacks involve discomfort in the center of the chest that lasts more than a few minutes, or that goes away and comes back. It can feel like uncomfortable pressure, squeezing, fullness, or pain.  Discomfort in other areas of the upper body. Symptoms can include pain or discomfort in one or both arms, the back, neck, jaw, or stomach.  Shortness of breath with or without chest discomfort.  Other signs may include breaking out in a cold sweat, nausea, or lightheadedness. Don't wait more than five minutes to call 911 - MINUTES MATTER! Fast action can save your life. Calling 911 is almost always the fastest way to get lifesaving treatment. Emergency Medical Services staff can begin treatment when they arrive -- up to an hour sooner than if someone gets to the hospital by car. The discharge information has been reviewed with the patient and spouse. The patient and spouse verbalized understanding. Discharge medications reviewed with the patient and spouse and appropriate educational materials and side effects teaching were provided.     Patient armband removed and shredded    ___________________________________________________________________________________________________________________________________

## 2018-12-26 ENCOUNTER — HOSPITAL ENCOUNTER (OUTPATIENT)
Age: 38
Setting detail: OBSERVATION
Discharge: HOME HEALTH | DRG: 393 | End: 2019-01-01
Attending: EMERGENCY MEDICINE | Admitting: HOSPITALIST
Payer: COMMERCIAL

## 2018-12-26 DIAGNOSIS — D64.9 ANEMIA, UNSPECIFIED TYPE: ICD-10-CM

## 2018-12-26 DIAGNOSIS — K92.2 GASTROINTESTINAL HEMORRHAGE, UNSPECIFIED GASTROINTESTINAL HEMORRHAGE TYPE: Primary | ICD-10-CM

## 2018-12-26 DIAGNOSIS — E87.1 HYPONATREMIA: ICD-10-CM

## 2018-12-26 DIAGNOSIS — N18.9 CHRONIC KIDNEY DISEASE, UNSPECIFIED CKD STAGE: ICD-10-CM

## 2018-12-26 PROBLEM — N05.1 FSGS (FOCAL SEGMENTAL GLOMERULOSCLEROSIS): Status: ACTIVE | Noted: 2018-12-26

## 2018-12-26 PROBLEM — R06.09 DYSPNEA ON EXERTION: Status: RESOLVED | Noted: 2018-11-15 | Resolved: 2018-12-26

## 2018-12-26 PROBLEM — I31.39 PERICARDIAL EFFUSION: Status: RESOLVED | Noted: 2018-11-16 | Resolved: 2018-12-26

## 2018-12-26 PROBLEM — D62 ACUTE BLOOD LOSS ANEMIA: Status: ACTIVE | Noted: 2018-12-26

## 2018-12-26 PROBLEM — I95.1 ORTHOSTATIC HYPOTENSION: Status: RESOLVED | Noted: 2018-11-15 | Resolved: 2018-12-26

## 2018-12-26 LAB
ALBUMIN SERPL-MCNC: 2.6 G/DL (ref 3.4–5)
ALBUMIN/GLOB SERPL: 0.9 {RATIO} (ref 0.8–1.7)
ALP SERPL-CCNC: 48 U/L (ref 45–117)
ALT SERPL-CCNC: 38 U/L (ref 16–61)
ANION GAP SERPL CALC-SCNC: 12 MMOL/L (ref 3–18)
AST SERPL-CCNC: 15 U/L (ref 15–37)
BASOPHILS # BLD: 0 K/UL (ref 0–0.1)
BASOPHILS NFR BLD: 0 % (ref 0–2)
BILIRUB SERPL-MCNC: 0.3 MG/DL (ref 0.2–1)
BUN SERPL-MCNC: 82 MG/DL (ref 7–18)
BUN/CREAT SERPL: 38
CALCIUM SERPL-MCNC: 8.2 MG/DL (ref 8.5–10.1)
CHLORIDE SERPL-SCNC: 101 MMOL/L (ref 100–108)
CK MB CFR SERPL CALC: 7.5 % (ref 0–4)
CK MB SERPL-MCNC: 2.1 NG/ML (ref 5–25)
CK SERPL-CCNC: 28 U/L (ref 39–308)
CO2 SERPL-SCNC: 21 MMOL/L (ref 21–32)
CREAT SERPL-MCNC: 2.17 MG/DL (ref 0.6–1.3)
DIFFERENTIAL METHOD BLD: ABNORMAL
EOSINOPHIL # BLD: 0 K/UL (ref 0–0.4)
EOSINOPHIL NFR BLD: 0 % (ref 0–5)
ERYTHROCYTE [DISTWIDTH] IN BLOOD BY AUTOMATED COUNT: 15.4 % (ref 11.6–14.5)
FLUAV AG NPH QL IA: NEGATIVE
FLUBV AG NOSE QL IA: NEGATIVE
GLOBULIN SER CALC-MCNC: 2.8 G/DL (ref 2–4)
GLUCOSE SERPL-MCNC: 120 MG/DL (ref 74–99)
HCT VFR BLD AUTO: 22.2 % (ref 36–48)
HGB BLD-MCNC: 7 G/DL (ref 13–16)
LIPASE SERPL-CCNC: 129 U/L (ref 73–393)
LYMPHOCYTES # BLD: 0.6 K/UL (ref 0.9–3.6)
LYMPHOCYTES NFR BLD: 9 % (ref 21–52)
MCH RBC QN AUTO: 29.5 PG (ref 24–34)
MCHC RBC AUTO-ENTMCNC: 31.5 G/DL (ref 31–37)
MCV RBC AUTO: 93.7 FL (ref 74–97)
MONOCYTES # BLD: 0.2 K/UL (ref 0.05–1.2)
MONOCYTES NFR BLD: 3 % (ref 3–10)
NEUTS SEG # BLD: 5.7 K/UL (ref 1.8–8)
NEUTS SEG NFR BLD: 88 % (ref 40–73)
PLATELET # BLD AUTO: 257 K/UL (ref 135–420)
PMV BLD AUTO: 8.4 FL (ref 9.2–11.8)
POTASSIUM SERPL-SCNC: 4.2 MMOL/L (ref 3.5–5.5)
PROT SERPL-MCNC: 5.4 G/DL (ref 6.4–8.2)
RBC # BLD AUTO: 2.37 M/UL (ref 4.7–5.5)
SODIUM SERPL-SCNC: 134 MMOL/L (ref 136–145)
TROPONIN I SERPL-MCNC: 0.03 NG/ML (ref 0–0.04)
WBC # BLD AUTO: 6.6 K/UL (ref 4.6–13.2)

## 2018-12-26 PROCEDURE — 86900 BLOOD TYPING SEROLOGIC ABO: CPT

## 2018-12-26 PROCEDURE — 87081 CULTURE SCREEN ONLY: CPT

## 2018-12-26 PROCEDURE — 86677 HELICOBACTER PYLORI ANTIBODY: CPT

## 2018-12-26 PROCEDURE — 65660000000 HC RM CCU STEPDOWN

## 2018-12-26 PROCEDURE — 82550 ASSAY OF CK (CPK): CPT

## 2018-12-26 PROCEDURE — 99285 EMERGENCY DEPT VISIT HI MDM: CPT

## 2018-12-26 PROCEDURE — 74011250637 HC RX REV CODE- 250/637: Performed by: HOSPITALIST

## 2018-12-26 PROCEDURE — 83690 ASSAY OF LIPASE: CPT

## 2018-12-26 PROCEDURE — 74011250636 HC RX REV CODE- 250/636: Performed by: PHYSICIAN ASSISTANT

## 2018-12-26 PROCEDURE — 85025 COMPLETE CBC W/AUTO DIFF WBC: CPT

## 2018-12-26 PROCEDURE — 96374 THER/PROPH/DIAG INJ IV PUSH: CPT

## 2018-12-26 PROCEDURE — C9113 INJ PANTOPRAZOLE SODIUM, VIA: HCPCS | Performed by: PHYSICIAN ASSISTANT

## 2018-12-26 PROCEDURE — 80053 COMPREHEN METABOLIC PANEL: CPT

## 2018-12-26 PROCEDURE — 84443 ASSAY THYROID STIM HORMONE: CPT

## 2018-12-26 PROCEDURE — 96361 HYDRATE IV INFUSION ADD-ON: CPT

## 2018-12-26 PROCEDURE — 96375 TX/PRO/DX INJ NEW DRUG ADDON: CPT

## 2018-12-26 PROCEDURE — 87804 INFLUENZA ASSAY W/OPTIC: CPT

## 2018-12-26 PROCEDURE — 93005 ELECTROCARDIOGRAM TRACING: CPT

## 2018-12-26 PROCEDURE — 99218 HC RM OBSERVATION: CPT

## 2018-12-26 PROCEDURE — 86920 COMPATIBILITY TEST SPIN: CPT

## 2018-12-26 RX ORDER — PANTOPRAZOLE SODIUM 40 MG/10ML
40 INJECTION, POWDER, LYOPHILIZED, FOR SOLUTION INTRAVENOUS
Status: COMPLETED | OUTPATIENT
Start: 2018-12-26 | End: 2018-12-26

## 2018-12-26 RX ORDER — ONDANSETRON 2 MG/ML
4 INJECTION INTRAMUSCULAR; INTRAVENOUS
Status: COMPLETED | OUTPATIENT
Start: 2018-12-26 | End: 2018-12-26

## 2018-12-26 RX ORDER — METOPROLOL TARTRATE 100 MG/1
100 TABLET ORAL 2 TIMES DAILY
Status: ON HOLD | COMMUNITY
End: 2018-12-27

## 2018-12-26 RX ORDER — SODIUM CHLORIDE 9 MG/ML
50 INJECTION, SOLUTION INTRAVENOUS CONTINUOUS
Status: DISCONTINUED | OUTPATIENT
Start: 2018-12-26 | End: 2018-12-28

## 2018-12-26 RX ORDER — ACETAMINOPHEN 325 MG/1
650 TABLET ORAL
Status: DISCONTINUED | OUTPATIENT
Start: 2018-12-26 | End: 2019-01-01 | Stop reason: HOSPADM

## 2018-12-26 RX ORDER — SODIUM CHLORIDE 9 MG/ML
250 INJECTION, SOLUTION INTRAVENOUS AS NEEDED
Status: DISCONTINUED | OUTPATIENT
Start: 2018-12-26 | End: 2019-01-01 | Stop reason: HOSPADM

## 2018-12-26 RX ORDER — PREDNISONE 20 MG/1
20 TABLET ORAL
Status: DISCONTINUED | OUTPATIENT
Start: 2018-12-27 | End: 2019-01-01 | Stop reason: HOSPADM

## 2018-12-26 RX ADMIN — PANTOPRAZOLE SODIUM 40 MG: 40 INJECTION, POWDER, FOR SOLUTION INTRAVENOUS at 21:00

## 2018-12-26 RX ADMIN — ONDANSETRON 4 MG: 2 INJECTION INTRAMUSCULAR; INTRAVENOUS at 21:00

## 2018-12-26 RX ADMIN — ACETAMINOPHEN 650 MG: 325 TABLET ORAL at 23:08

## 2018-12-26 RX ADMIN — SODIUM CHLORIDE 1000 ML: 900 INJECTION, SOLUTION INTRAVENOUS at 21:00

## 2018-12-27 LAB
ALBUMIN SERPL-MCNC: 2.4 G/DL (ref 3.4–5)
ALBUMIN/GLOB SERPL: 1 {RATIO} (ref 0.8–1.7)
ALP SERPL-CCNC: 40 U/L (ref 45–117)
ALT SERPL-CCNC: 32 U/L (ref 16–61)
ANION GAP SERPL CALC-SCNC: 11 MMOL/L (ref 3–18)
APPEARANCE UR: CLEAR
AST SERPL-CCNC: 21 U/L (ref 15–37)
BACTERIA URNS QL MICRO: ABNORMAL /HPF
BILIRUB SERPL-MCNC: 0.4 MG/DL (ref 0.2–1)
BILIRUB UR QL: NEGATIVE
BUN SERPL-MCNC: 80 MG/DL (ref 7–18)
BUN/CREAT SERPL: 39
CALCIUM SERPL-MCNC: 7.9 MG/DL (ref 8.5–10.1)
CHLORIDE SERPL-SCNC: 104 MMOL/L (ref 100–108)
CO2 SERPL-SCNC: 22 MMOL/L (ref 21–32)
COLOR UR: YELLOW
CREAT SERPL-MCNC: 2.07 MG/DL (ref 0.6–1.3)
ERYTHROCYTE [DISTWIDTH] IN BLOOD BY AUTOMATED COUNT: 15 % (ref 11.6–14.5)
GLOBULIN SER CALC-MCNC: 2.5 G/DL (ref 2–4)
GLUCOSE BLD STRIP.AUTO-MCNC: 97 MG/DL (ref 70–110)
GLUCOSE SERPL-MCNC: 77 MG/DL (ref 74–99)
GLUCOSE UR STRIP.AUTO-MCNC: NEGATIVE MG/DL
HCT VFR BLD AUTO: 22.6 % (ref 36–48)
HCT VFR BLD AUTO: 25.4 % (ref 36–48)
HGB BLD-MCNC: 7.1 G/DL (ref 13–16)
HGB BLD-MCNC: 8.2 G/DL (ref 13–16)
HGB UR QL STRIP: NEGATIVE
KETONES UR QL STRIP.AUTO: NEGATIVE MG/DL
LEUKOCYTE ESTERASE UR QL STRIP.AUTO: NEGATIVE
MCH RBC QN AUTO: 29.3 PG (ref 24–34)
MCHC RBC AUTO-ENTMCNC: 31.4 G/DL (ref 31–37)
MCV RBC AUTO: 93.4 FL (ref 74–97)
NITRITE UR QL STRIP.AUTO: NEGATIVE
PH UR STRIP: 5 [PH] (ref 5–8)
PLATELET # BLD AUTO: 199 K/UL (ref 135–420)
PMV BLD AUTO: 8.4 FL (ref 9.2–11.8)
POTASSIUM SERPL-SCNC: 3.6 MMOL/L (ref 3.5–5.5)
PROT SERPL-MCNC: 4.9 G/DL (ref 6.4–8.2)
PROT UR STRIP-MCNC: ABNORMAL MG/DL
RBC # BLD AUTO: 2.42 M/UL (ref 4.7–5.5)
SODIUM SERPL-SCNC: 137 MMOL/L (ref 136–145)
SP GR UR REFRACTOMETRY: 1.01 (ref 1–1.03)
TSH SERPL DL<=0.05 MIU/L-ACNC: 0.09 UIU/ML (ref 0.36–3.74)
UROBILINOGEN UR QL STRIP.AUTO: 0.2 EU/DL (ref 0.2–1)
WBC # BLD AUTO: 5.4 K/UL (ref 4.6–13.2)

## 2018-12-27 PROCEDURE — 80053 COMPREHEN METABOLIC PANEL: CPT

## 2018-12-27 PROCEDURE — C9113 INJ PANTOPRAZOLE SODIUM, VIA: HCPCS | Performed by: HOSPITALIST

## 2018-12-27 PROCEDURE — 81001 URINALYSIS AUTO W/SCOPE: CPT

## 2018-12-27 PROCEDURE — 77030037875 HC DRSG MEPILEX <16IN BORD MOLN -A

## 2018-12-27 PROCEDURE — P9016 RBC LEUKOCYTES REDUCED: HCPCS

## 2018-12-27 PROCEDURE — 85027 COMPLETE CBC AUTOMATED: CPT

## 2018-12-27 PROCEDURE — 74011250637 HC RX REV CODE- 250/637: Performed by: HOSPITALIST

## 2018-12-27 PROCEDURE — 74011636637 HC RX REV CODE- 636/637: Performed by: HOSPITALIST

## 2018-12-27 PROCEDURE — 74011250636 HC RX REV CODE- 250/636: Performed by: HOSPITALIST

## 2018-12-27 PROCEDURE — 77030018846 HC SOL IRR STRL H20 ICUM -A

## 2018-12-27 PROCEDURE — 82962 GLUCOSE BLOOD TEST: CPT

## 2018-12-27 PROCEDURE — 99218 HC RM OBSERVATION: CPT

## 2018-12-27 PROCEDURE — 77030010545

## 2018-12-27 PROCEDURE — 36415 COLL VENOUS BLD VENIPUNCTURE: CPT

## 2018-12-27 PROCEDURE — 85018 HEMOGLOBIN: CPT

## 2018-12-27 PROCEDURE — 65660000000 HC RM CCU STEPDOWN

## 2018-12-27 PROCEDURE — 74011000250 HC RX REV CODE- 250: Performed by: HOSPITALIST

## 2018-12-27 PROCEDURE — 77030011256 HC DRSG MEPILEX <16IN NO BORD MOLN -A

## 2018-12-27 PROCEDURE — 36430 TRANSFUSION BLD/BLD COMPNT: CPT

## 2018-12-27 RX ORDER — ONDANSETRON 2 MG/ML
4 INJECTION INTRAMUSCULAR; INTRAVENOUS
Status: DISCONTINUED | OUTPATIENT
Start: 2018-12-27 | End: 2019-01-01 | Stop reason: HOSPADM

## 2018-12-27 RX ORDER — HYDROCHLOROTHIAZIDE 25 MG/1
25 TABLET ORAL DAILY
COMMUNITY
End: 2020-09-23 | Stop reason: ALTCHOICE

## 2018-12-27 RX ORDER — LEVOTHYROXINE SODIUM 150 UG/1
125 TABLET ORAL
COMMUNITY

## 2018-12-27 RX ORDER — POTASSIUM CHLORIDE 750 MG/1
10 TABLET, EXTENDED RELEASE ORAL 2 TIMES DAILY
Status: DISCONTINUED | OUTPATIENT
Start: 2018-12-27 | End: 2018-12-28

## 2018-12-27 RX ORDER — CHOLECALCIFEROL (VITAMIN D3) 125 MCG
200 CAPSULE ORAL
COMMUNITY

## 2018-12-27 RX ORDER — MULTIVIT WITH MINERALS/HERBS
1 TABLET ORAL DAILY
COMMUNITY
End: 2020-09-23 | Stop reason: ALTCHOICE

## 2018-12-27 RX ORDER — TORSEMIDE 100 MG/1
100 TABLET ORAL DAILY
COMMUNITY
End: 2020-01-01 | Stop reason: ALTCHOICE

## 2018-12-27 RX ORDER — LATANOPROST 50 UG/ML
1 SOLUTION/ DROPS OPHTHALMIC
Refills: 5 | COMMUNITY
Start: 2018-12-05

## 2018-12-27 RX ORDER — METOPROLOL TARTRATE 50 MG/1
100 TABLET ORAL 2 TIMES DAILY
Status: DISCONTINUED | OUTPATIENT
Start: 2018-12-27 | End: 2018-12-28

## 2018-12-27 RX ORDER — METOPROLOL SUCCINATE 100 MG/1
50 TABLET, EXTENDED RELEASE ORAL 2 TIMES DAILY
COMMUNITY

## 2018-12-27 RX ORDER — HYDROCHLOROTHIAZIDE 25 MG/1
25 TABLET ORAL DAILY
Status: DISCONTINUED | OUTPATIENT
Start: 2018-12-27 | End: 2019-01-01 | Stop reason: HOSPADM

## 2018-12-27 RX ORDER — LEVOTHYROXINE SODIUM 75 UG/1
150 TABLET ORAL
Status: DISCONTINUED | OUTPATIENT
Start: 2018-12-27 | End: 2019-01-01 | Stop reason: HOSPADM

## 2018-12-27 RX ORDER — ALBUTEROL SULFATE 90 UG/1
2 AEROSOL, METERED RESPIRATORY (INHALATION)
COMMUNITY

## 2018-12-27 RX ORDER — MYCOPHENOLATE MOFETIL 250 MG/1
1000 CAPSULE ORAL 2 TIMES DAILY
Status: DISCONTINUED | OUTPATIENT
Start: 2018-12-27 | End: 2019-01-01 | Stop reason: HOSPADM

## 2018-12-27 RX ORDER — COLESEVELAM 180 1/1
1875 TABLET ORAL 2 TIMES DAILY WITH MEALS
Status: DISCONTINUED | OUTPATIENT
Start: 2018-12-27 | End: 2018-12-28

## 2018-12-27 RX ORDER — SODIUM CHLORIDE 9 MG/ML
250 INJECTION, SOLUTION INTRAVENOUS AS NEEDED
Status: DISCONTINUED | OUTPATIENT
Start: 2018-12-27 | End: 2019-01-01 | Stop reason: HOSPADM

## 2018-12-27 RX ORDER — LANOLIN ALCOHOL/MO/W.PET/CERES
400 CREAM (GRAM) TOPICAL
Status: DISCONTINUED | OUTPATIENT
Start: 2018-12-27 | End: 2019-01-01 | Stop reason: HOSPADM

## 2018-12-27 RX ORDER — ALFUZOSIN HYDROCHLORIDE 10 MG/1
10 TABLET, EXTENDED RELEASE ORAL
Status: DISCONTINUED | OUTPATIENT
Start: 2018-12-27 | End: 2019-01-01 | Stop reason: HOSPADM

## 2018-12-27 RX ADMIN — Medication 400 MG: at 11:45

## 2018-12-27 RX ADMIN — METOPROLOL TARTRATE 100 MG: 50 TABLET ORAL at 21:13

## 2018-12-27 RX ADMIN — SODIUM CHLORIDE 40 MG: 9 INJECTION INTRAMUSCULAR; INTRAVENOUS; SUBCUTANEOUS at 01:16

## 2018-12-27 RX ADMIN — SODIUM CHLORIDE 50 ML/HR: 900 INJECTION, SOLUTION INTRAVENOUS at 18:53

## 2018-12-27 RX ADMIN — SODIUM CHLORIDE 40 MG: 9 INJECTION INTRAMUSCULAR; INTRAVENOUS; SUBCUTANEOUS at 21:15

## 2018-12-27 RX ADMIN — SODIUM CHLORIDE 100 ML/HR: 900 INJECTION, SOLUTION INTRAVENOUS at 01:57

## 2018-12-27 RX ADMIN — HYDROCHLOROTHIAZIDE 25 MG: 25 TABLET ORAL at 11:45

## 2018-12-27 RX ADMIN — POTASSIUM CHLORIDE 10 MEQ: 10 TABLET, EXTENDED RELEASE ORAL at 21:15

## 2018-12-27 RX ADMIN — ACETAMINOPHEN 650 MG: 325 TABLET ORAL at 05:54

## 2018-12-27 RX ADMIN — ALFUZOSIN HYDROCHLORIDE 10 MG: 10 TABLET ORAL at 21:15

## 2018-12-27 RX ADMIN — ACETAMINOPHEN 650 MG: 325 TABLET ORAL at 19:01

## 2018-12-27 RX ADMIN — ONDANSETRON 4 MG: 2 INJECTION INTRAMUSCULAR; INTRAVENOUS at 10:56

## 2018-12-27 RX ADMIN — ACETAMINOPHEN 650 MG: 325 TABLET ORAL at 11:12

## 2018-12-27 RX ADMIN — LEVOTHYROXINE SODIUM 150 MCG: 75 TABLET ORAL at 21:13

## 2018-12-27 RX ADMIN — MYCOPHENOLATE MOFETIL 1000 MG: 250 CAPSULE ORAL at 21:13

## 2018-12-27 RX ADMIN — PREDNISONE 20 MG: 20 TABLET ORAL at 18:00

## 2018-12-27 RX ADMIN — SODIUM CHLORIDE 40 MG: 9 INJECTION INTRAMUSCULAR; INTRAVENOUS; SUBCUTANEOUS at 09:13

## 2018-12-27 RX ADMIN — ONDANSETRON 4 MG: 2 INJECTION INTRAMUSCULAR; INTRAVENOUS at 14:02

## 2018-12-27 NOTE — PROGRESS NOTES
Assist primary nurse with blood transfusion. Transfusion initiated at this time, Patient tolerating well and NAD noted.

## 2018-12-27 NOTE — H&P
[facility]  HISTORY AND PHYSICAL      Name:Liliana AQUINO  MR#: [id number]  : 1980  ACCOUNT #: [de-identified]   ADMIT DATE: 2018    ADMITTING DIAGNOSES:  1. Acute blood loss anemia. 2.  Upper gastrointestinal bleed. 3.  Chronic kidney disease stage III. 4.  Focal segmental glomerulosclerosis. 5.  Psoriatic arthritis. 6.  Morbid obesity. 7.  Obstructive sleep apnea on CPAP (continuous positive airway pressure)  8. Hypertension. HISTORY OF PRESENT ILLNESS:  This is a 68-year-old gentleman with a multitude of medical issues including advanced kidney disease secondary to FSG as well as  having psoriatic arthritis. He came into the emergency room complaining of generalized weakness, chills, feeling \"awful,\"  not eating or drinking very much over the last few days. On  he had passed a bloody stool per his wife's report. She took a picture of it. He had no further blood per rectum since then, but noticed the last couple of days that his stool had turned black. He recently completed a course of amoxicillin for a left leg cellulitis on . He has been taking Aleve against the advice of his nephrologist for the past few days for stomach pain as well intermittently taking Tylenol and Tums, also. He is followed by Dr. Letitia Gu and Dr. Yanni Montalvo, nephrology [facility] He has never had any GI bleeding or ulcerations that he is aware of. His hemoglobin is down to 7. His last baseline here that we checked was 8.9 in NOvember when he was hospitalized for orthostasis. PAST MEDICAL HISTORY:  Includes psoriatic arthritis, chronic kidney disease stage III, hyperlipidemia, morbid obesity, hypertension, psoriatic arthritis, anxiety, obstructive sleep apnea on CPAP, ADHD. MEDICATIONS:  He takes at home include prednisone 20 mg daily, CellCept, Welchol, Uroxatral, HCTZ, Synthroid, mag ox, Lopressor, potassium.     ALLERGIES:  HE HAS NO MEDICATION ALLERGIES. SOCIAL HISTORY:  He lives at home with his wife. He is not working due to his medical illnesses. He smoked a pack a day for 18 years. He quit over 10 years ago. He drinks very occasional drink of alcohol, maybe twice a year. Denies illicit drug use. FAMILY HISTORY:  Mother and father both had heart disease and had heart attacks. REVIEW OF SYSTEMS:  CONSTITUTIONAL:  He has had chills and generalized weakness and fatigue, feeling poorly overall. HEAD, EARS, NOSE AND THROAT:  No sore throat, cough or cold symptoms. No nasal congestion. RESPIRATORY:  No increased shortness of breath, coughing or wheezing. CARDIOVASCULAR:  No chest pain, palpitations. He has chronic leg swelling. GASTROINTESTINAL:  He has had black stools, mild nausea. No hematemesis. Decreased appetite over the past 5 days, intermittent abdominal pain, mostly in the left lower quadrant, aching in nature. He had a bright red bloody watery stool on 12/2012, none since. SKIN:  No new lesions. The cellulitis of his left leg and foot is now resolved per his wife and we examined that together. He completed amoxicillin on 12/24. NEUROLOGIC:  He has had generalized headaches over the past few days also. ENDOCRINOLOGIC:  He has no new swollen glands. He does have cold intolerance currently and is feeling a lot of chills. NEUROLOGIC:  Otherwise, he has had no syncopal events or dizziness. PHYSICAL EXAMINATION:  GENERAL:  This is a cushingoid appearing, morbidly obese 47-year-old white male. He is awake, alert, oriented x3. He is uncomfortable but he is mentating appropriately and answers questions completely. His wife is at the bedside. VITAL SIGNS:  His temp is 97.8, pulse 115, blood pressure 123/64, respiratory rate 13, SpO2 of 99% on room air. He is 359 pounds. His face appears cushingoid. HEENT:  His oropharynx is dry. No lesions. NECK:  Supple. No thyromegaly or lymphadenopathy.   LUNGS:  Clear bilaterally. No rales, rhonchi or wheezes. HEART:  Tachycardic, regular rhythm. No murmur, rub or gallop. ABDOMEN:  Obese, soft with normal bowel sounds. No epigastric tenderness, no rebound, ascites or guarding noted. Too obese to palpate any mass or hepatosplenomegaly. LOWER EXTREMITIES:  Chronic 3-4+ pitting edema from the feet to the mid knee range bilaterally. No cellulitis seen on the left lower extremity; no lymphangitis changes. NEUROLOGIC:  Nonfocal.  He follows commands appropriately. He is mentating normally. LABORATORY DATA:  Show white count of 6.6, H and H is 7 and 22.2. His platelets are 095. His BUN and creatinine are 82 and 2.17, sodium is 134, potassium 4.2, bicarb is 21, gap is 12. Cardiac markers were normal.  LFTs unremarkable. He has been typed and crossed. An H. pylori antigen has been sent. An EKG showed sinus tachycardia, possible left atrial enlargement. He had flu testing A and B that were both negative. ASSESSMENT AND PLAN:  1. Gastrointestinal bleeding with acute blood loss anemia. His hemoglobin is down from his baseline where we saw it last in November at 8.9-9.6. It is now down to 7. Plan is to continue IV Protonix. GI consult from the emergency room. IV fluid resuscitation. Transfuse 1 unit of blood. Repeat CBC in the morning. H. pylori testing has been done. 2.  Chronic kidney disease stage III. Plan to continue monitoring daily BMP. Continue his home immunosuppressant medications as long as GI is okay with that and avoid any NSAIDs. We can use Tylenol if he needs it for headache or discomfort. I have cautioned him and his wife about continuing NSAIDs with his kidney disease and obviously with a GI bleed. This is advised against.  3.  Obstructive sleep apnea on CPAP (continuous positive airway pressure). He is wearing his CPAP at home routinely per his wife. She will bring it tomorrow. In the interim, we will use hospital CPAP tonight.   He should wear it every bedtime. 4.  Morbid obesity; no plans actively for this issue. 5.  Hypothyroidism. Plan: Continue his Synthroid and recheck TSH level as it was abnormal at his last hospitalization. 6.  Chronic immunosuppression for focal segmental glomerulosclerosis and psoriatic arthritis. We will plan to continue his CellCept. In summary, we are admitting the patient to telemetry. Fluid resuscitation this evening; transfuse 1 unit of packed red blood cells. Continue PPI IV. GI consult for probable EGD tomorrow. Avoid any NSAIDs or other medications that could trigger further GI bleeding and use his CPAP nightly. Monitor on telemetry. Tylenol as needed for headache or pain and expected length of stay 3 days in the hospital.  Also, will follow up H. pylori testing and TSH level.       MD DELICIA Escobar/[place]  D: 12/26/2018 22:43     T: 12/26/2018 23:25  JOB #: [id number]  CC: Jante Castillo MD

## 2018-12-27 NOTE — PROGRESS NOTES
DC Plan:  Discharge home with MD follow up, family assistance and [facility] once medically stable    Chart reviewed. Pt admitted for GI bleed. No discharge order for today noted. Met with pt and pts wife at bedside. Pts home address confirmed per faces sheet. Pts PCP is MD Sanchez. Pt states he has been active with [facility] but does NOT want to use them any longer. FOC offered for [facility] and pt chose [facility]. Referral placed. Pt has RW and wheelchair. No immediate dc concerns identified. CM will cont to follow. Reason for Admission:   GI BLEED                   RRAT Score:          20 LOW           Plan for utilizing home health:      Yes, [facility]                    Likelihood of Readmission:  MOD                         Transition of Care Plan:  Discharge home with MD follow up, family assistance and [facility] once medically stable                    Care Management Interventions  PCP Verified by CM: Yes  Mode of Transport at Discharge:  Other (see comment)(family v medical transport)  Transition of Care Consult (CM Consult): Discharge Planning, [address]: Yes  Current Support Network: Lives with Spouse  Confirm Follow Up Transport: Family(v cab)  Plan discussed with Pt/Family/Caregiver: Yes  Freedom of Choice Offered: Yes  Discharge Location  Discharge Placement: Home with home health

## 2018-12-27 NOTE — PROGRESS NOTES
Hospitalist Progress Note    Patient: Dee Hummel MRN: [id number]  CSN: [id number]    YOB: 1980  Age: 34 y.o. Sex: male    DOA: 12/26/2018 LOS:  LOS: 1 day          Chief Complaint:    Gi bleed      Assessment/Plan     1. Acute blood loss anemia. Transfused 1 unit PRBC last night-will order second unit as Hgb only 7.1 after transfusion  2. Upper gastrointestinal bleed. May be gastritis or ulcer related to NSAIDS and steroid use-check h pylori and ask GI to see  3. Chronic kidney disease stage III. Followed by nephrology-follow daily BMP  4. Focal segmental glomerulosclerosis. On PO prednsione every day and cellcept  5. Psoriatic arthritis. not on enbrel any longer-no chnages here  6. Morbid obesity. Needs PT to assess as physically quite deconditioned  7. Obstructive sleep apnea on CPAP-nightly CPAP use ordered  8. Hypertension.  Continue home meds for this, he requests his HCTZ for swelling, will also reduce IVF rate for now but keep NPO    GI consult  IV protonix  Transfuse second unit of blood      Can reduce IVF rate    Tele monitoring  NO NSAIDS allowed      Disposition :  Patient Active Problem List   Diagnosis Code    Psoriatic arthritis ([facility] [address] L40.50    HAWA (obstructive sleep apnea) G47.33    Morbidly obese (HCC) E66.01    HTN (hypertension) I10    Hypothyroidism E03.9    Stage 3 chronic kidney disease (HCC) N18.3    Acute blood loss anemia D62    GI bleed K92.2    FSGS (focal segmental glomerulosclerosis) N05.1       Subjective:    Denies CP, SOB  Has hand swelling, worried about dry eyes and leg cramps  No BM or diarrhea      Review of systems:    Constitutional: denies fevers, chills, myalgias  Respiratory: denies cough  Cardiovascular: denies palpitations  Gastrointestinal: denies nausea, vomiting, diarrhea      Vital signs/Intake and Output:  Visit Vitals  /66 (BP 1 Location: Right arm, BP Patient Position: At rest)   Pulse (!) 115   Temp 97.8 °F (36.6 °C) Resp 18   Ht 5' 9\" (1.753 m)   Wt (!) 162.8 kg (358 lb 16 oz)   SpO2 100%   BMI 53.01 kg/m²     Current Shift:  12/26 1901 - 12/27 0700  In: 1320 [I.V.:1000]  Out: 400 [Urine:400]  Last three shifts:  No intake/output data recorded. Exam:    General: morbidly obese WM alert, NAD, OX3  Head/Neck: NCAT, supple, No masses, No lymphadenopathy  CVS:Regular rate and rhythm, no M/R/G, S1/S2 heard, no thrill  Lungs:Clear to auscultation bilaterally, no wheezes, rhonchi, or rales  Abdomen: Soft, Nontender, No distention, Normal Bowel sounds, No hepatomegaly  Extremities: 3 plus edema LE BL  Neuro:grossly normal , follows commands  Psych:appropriate                Labs: Results:       Chemistry Recent Labs     12/26/18 2030   *   *   K 4.2      CO2 21   BUN 82*   CREA 2.17*   CA 8.2*   AGAP 12   BUCR 38   AP 48   TP 5.4*   ALB 2.6*   GLOB 2.8   AGRAT 0.9      CBC w/Diff Recent Labs     12/26/18 2030   WBC 6.6   RBC 2.37*   HGB 7.0*   HCT 22.2*      GRANS 88*   LYMPH 9*   EOS 0      Cardiac Enzymes Recent Labs     12/26/18 2030   CPK 28*   CKND1 7.5*      Coagulation No results for input(s): PTP, INR, APTT in the last 72 hours. No lab exists for component: INREXT    Lipid Panel No results found for: CHOL, CHOLPOCT, CHOLX, CHLST, CHOLV, 569798, HDL, LDL, LDLC, DLDLP, 953258, VLDLC, VLDL, TGLX, TRIGL, TRIGP, TGLPOCT, CHHD, CHHDX   BNP No results for input(s): BNPP in the last 72 hours.    Liver Enzymes Recent Labs     12/26/18 2030   TP 5.4*   ALB 2.6*   AP 48   SGOT 15      Thyroid Studies Lab Results   Component Value Date/Time    TSH <0.01 (L) 11/17/2018 04:19 AM        Procedures/imaging: see electronic medical records for all procedures/Xrays and details which were not copied into this note but were reviewed prior to creation of Delaney Irwin MD

## 2018-12-27 NOTE — PROGRESS NOTES
Shift Summary:  Patient tolerated 1 unit of PRBCs overnight and closely monitored. No evidence of blood in urine and denied blood in stools. Awaiting post-transfusion H&H results. Patient required Tylenol 650 mg po for HA rating at 6/10, which provided relief. Patient remained tachycardic, remained NPO.

## 2018-12-27 NOTE — PROGRESS NOTES
0945: Assumed care of pt from [place]. 1000: Entered pt room to introduce self pt was sleeping wife present in room and was asking about when the doctors will be rounding. This RN stated Dr would be around soon. 1014: released order for blood transfusion and will administer soon. 1100: Transfusion started education given consent in chart. Pt tolerating transfusion so far. 1159: Accidentally documentted transfusion under the wrong transfusion in the flow sheet. Daryl Smith veirfied transfusion again with this RN under the right blood transfusion in the flow sheet. Pt still tolerating transfusion well afebrile no c/o at all.  1406:Recieved permission from Isra SHOOK to give dose of Zofran early for pt c/o  Nausea. Jeremiah Herrera is no longer in building Marcelino iNchole stepped in ans  helped this RN. 1600:pt still c/o of nausea have not given the rest of pt morning medication due to nausea. 1830: Changed dressings to right side and changed bedding and gown. Tylenol given for pain notified night RN. Pt walked to bathroom with no real difficulty. But pt did c/o of bathroom being too far away r/t swollen feet. No BM today.

## 2018-12-27 NOTE — ED PROVIDER NOTES
EMERGENCY DEPARTMENT HISTORY AND PHYSICAL EXAM    Date: 12/26/2018  Patient Name: Micheline Perez    History of Presenting Illness     Chief Complaint   Patient presents with    Chills    Fatigue    Nausea         History Provided By: Patient    Chief Complaint: Dehydration  Duration: 3-4 Days  Timing:  Acute  Severity: N/A  Modifying Factors: No modifying factors  Associated Symptoms: diarrhea, nausea, chills, HA    Additional History (Context):   8:14 PM  Micheline Perez is a 34 y.o. male with PMHx of HLD, palpitations, HTN, CKD, morbid obesity, psoriatic arthritis presents to the emergency department via EMS C/O dehydration onset 3-4 days. Associated sxs include diarrhea, nausea, chills, HA. Pt spoke with his doctor this morning recommended the patient come into the ED as he is reported being dehydrated. Pt reports recently being on amoxicillin, finished course two days ago for a leg infection. Pt has taken Tylenol, Aleeve, and two Tums two hours ago. Pt is not currently on dialysis and is followed by Adriano Rueda DO. Pt reports his stool to be very dark and sticky and is currently on imodium. Pt denies rhinorrhea, sore throat, cough, abd pain, and any other sxs or complaints.      PCP: Sandy Farfan MD    Current Facility-Administered Medications   Medication Dose Route Frequency Provider Last Rate Last Dose    0.9% sodium chloride infusion 250 mL  250 mL IntraVENous PRN Dee Houston PA        pantoprazole (PROTONIX) 40 mg in sodium chloride 0.9% 10 mL injection  40 mg IntraVENous Q12H Adarsh Cummings MD        0.9% sodium chloride infusion  100 mL/hr IntraVENous CONTINUOUS Adarsh Cummings MD        acetaminophen (TYLENOL) tablet 650 mg  650 mg Oral Q6H PRN Adarsh Cummings MD   650 mg at 12/26/18 2308    [START ON 12/27/2018] predniSONE (DELTASONE) tablet 20 mg  20 mg Oral DAILY WITH LUNCH Adarsh Cummings MD         Current Outpatient Medications   Medication Sig Dispense Refill    hydroCHLOROthiazide 25 mg tab 25 mg, lisinopril 20 mg tab 20 mg Take  by mouth daily.  metoprolol tartrate (LOPRESSOR) 100 mg IR tablet Take 100 mg by mouth two (2) times a day.  predniSONE (DELTASONE) 20 mg tablet Take 20 mg by mouth three (3) times daily.  alfuzosin SR (UROXATRAL) 10 mg SR tablet Take 10 mg by mouth nightly.  magnesium oxide (MAG-OX) 400 mg tablet Take 400 mg by mouth daily (with lunch).  levothyroxine (SYNTHROID) 125 mcg tablet Take 150 mcg by mouth nightly.  hydroCHLOROthiazide (MICROZIDE) 12.5 mg capsule Take 1 Cap by mouth daily. 30 Cap 0    mycophenolate (CELLCEPT) 500 mg tablet Take 1,000 mg by mouth two (2) times a day.  loperamide (IMMODIUM) 2 mg tablet Take 4 mg by mouth as needed for Diarrhea.  potassium chloride SA (MICRO-K) 10 mEq capsule Take 10 mEq by mouth two (2) times a day.  colesevelam (WELCHOL) 625 mg tablet Take 1,875 mg by mouth two (2) times daily (with meals).  etanercept (ENBREL) 50 mg/mL (0.98 mL) injection 50 mg by SubCUTAneous route every seven (7) days.          Past History     Past Medical History:  Past Medical History:   Diagnosis Date    ADD (attention deficit disorder)     Chest tightness 4/14/2011    Chronic kidney disease     Fatigue     Hyperlipidemia     Hypertension     Morbid obesity ([facility] [address]     Palpitation 4/14/2011    Psoriatic arthritis (HCC)     Psychiatric disorder     anxiety    SOB (shortness of breath) on exertion 4/14/2011       Past Surgical History:  Past Surgical History:   Procedure Laterality Date    HX TONSIL AND ADENOIDECTOMY      HX TONSILLECTOMY         Family History:  Family History   Problem Relation Age of Onset    Heart Attack Mother 36    Heart Attack Father 48    Heart Surgery Father 48       Social History:  Social History     Tobacco Use    Smoking status: Former Smoker     Packs/day: 1.00     Years: 18.00     Pack years: 18.00     Types: Cigarettes     Last attempt to quit: 1/1/2008     Years since quitting: 10.9    Smokeless tobacco: Never Used   Substance Use Topics    Alcohol use: Yes     Comment: 2 times a year    Drug use: No       Allergies:  No Known Allergies      Review of Systems   Review of Systems   Constitutional: Positive for chills and fatigue. HENT: Negative for rhinorrhea and sore throat. Respiratory: Negative for cough and shortness of breath. Cardiovascular: Negative for chest pain. Gastrointestinal: Positive for diarrhea and nausea. Negative for abdominal pain. Skin: Negative for rash. Neurological: Positive for headaches. All other systems reviewed and are negative. Physical Exam     Vitals:    12/26/18 2115 12/26/18 2130 12/26/18 2230 12/26/18 2300   BP:  139/54 123/64 122/48   Pulse:   (!) 115 (!) 105   Resp: 19 20 13 19   Temp:   97.8 °F (36.6 °C)    SpO2: 93% 96% 99% 91%   Weight:       Height:         Physical Exam   Constitutional: He is oriented to person, place, and time. He appears well-developed and well-nourished. Morbidly obese, pale, lying on stretcher   HENT:   Head: Normocephalic and atraumatic. Neck: Normal range of motion. Neck supple. Cardiovascular: Regular rhythm, normal heart sounds and intact distal pulses. Tachycardia present. No murmur heard. Pulmonary/Chest: Effort normal and breath sounds normal. No respiratory distress. He has no wheezes. He has no rales. Abdominal: Soft. Bowel sounds are normal. He exhibits no distension. There is no hepatosplenomegaly. There is no tenderness. There is no rigidity, no rebound, no guarding and no CVA tenderness. abd non tender to palpation    Genitourinary: Rectal exam shows guaiac positive stool. Genitourinary Comments: Dark stool, heme +   Musculoskeletal:   2+ pitting edema to the BLE    Neurological: He is alert and oriented to person, place, and time. Skin: There is pallor. Psychiatric: He has a normal mood and affect.  Judgment normal.   Nursing note and vitals reviewed. Diagnostic Study Results     Labs -     Recent Results (from the past 12 hour(s))   INFLUENZA A & B AG (RAPID TEST)    Collection Time: 12/26/18  8:15 PM   Result Value Ref Range    Influenza A Antigen NEGATIVE  NEG      Influenza B Antigen NEGATIVE  NEG     STREP THROAT SCREEN    Collection Time: 12/26/18  8:15 PM   Result Value Ref Range    Special Requests: NO SPECIAL REQUESTS      Strep Screen NEGATIVE       Strep Screen (NOTE)  TESTING PERFORMED AT THE [facility] ER ON 12/26/18. Culture result: PENDING    EKG, 12 LEAD, INITIAL    Collection Time: 12/26/18  8:16 PM   Result Value Ref Range    Ventricular Rate 118 BPM    Atrial Rate 118 BPM    P-R Interval 114 ms    QRS Duration 100 ms    Q-T Interval 302 ms    QTC Calculation (Bezet) 423 ms    Calculated P Axis 50 degrees    Calculated R Axis 4 degrees    Calculated T Axis 91 degrees    Diagnosis       Sinus tachycardia  Possible Left atrial enlargement  Inferior infarct , age undetermined  Abnormal ECG  When compared with ECG of 19-MAR-2017 17:29,  Vent. rate has increased BY  49 BPM  Inferior infarct is now present  ST now depressed in Anterior leads     CBC WITH AUTOMATED DIFF    Collection Time: 12/26/18  8:30 PM   Result Value Ref Range    WBC 6.6 4.6 - 13.2 K/uL    RBC 2.37 (L) 4.70 - 5.50 M/uL    HGB 7.0 (L) 13.0 - 16.0 g/dL    HCT 22.2 (L) 36.0 - 48.0 %    MCV 93.7 74.0 - 97.0 FL    MCH 29.5 24.0 - 34.0 PG    MCHC 31.5 31.0 - 37.0 g/dL    RDW 15.4 (H) 11.6 - 14.5 %    PLATELET 959 988 - 441 K/uL    MPV 8.4 (L) 9.2 - 11.8 FL    NEUTROPHILS 88 (H) 40 - 73 %    LYMPHOCYTES 9 (L) 21 - 52 %    MONOCYTES 3 3 - 10 %    EOSINOPHILS 0 0 - 5 %    BASOPHILS 0 0 - 2 %    ABS. NEUTROPHILS 5.7 1.8 - 8.0 K/UL    ABS. LYMPHOCYTES 0.6 (L) 0.9 - 3.6 K/UL    ABS. MONOCYTES 0.2 0.05 - 1.2 K/UL    ABS. EOSINOPHILS 0.0 0.0 - 0.4 K/UL    ABS.  BASOPHILS 0.0 0.0 - 0.1 K/UL    DF AUTOMATED     METABOLIC PANEL, COMPREHENSIVE    Collection Time: 12/26/18 8:30 PM   Result Value Ref Range    Sodium 134 (L) 136 - 145 mmol/L    Potassium 4.2 3.5 - 5.5 mmol/L    Chloride 101 100 - 108 mmol/L    CO2 21 21 - 32 mmol/L    Anion gap 12 3.0 - 18 mmol/L    Glucose 120 (H) 74 - 99 mg/dL    BUN 82 (H) 7.0 - 18 MG/DL    Creatinine 2.17 (H) 0.6 - 1.3 MG/DL    BUN/Creatinine ratio 38      GFR est AA 41 (L) >60 ml/min/1.73m2    GFR est non-AA 34 (L) >60 ml/min/1.73m2    Calcium 8.2 (L) 8.5 - 10.1 MG/DL    Bilirubin, total 0.3 0.2 - 1.0 MG/DL    ALT (SGPT) 38 16 - 61 U/L    AST (SGOT) 15 15 - 37 U/L    Alk. phosphatase 48 45 - 117 U/L    Protein, total 5.4 (L) 6.4 - 8.2 g/dL    Albumin 2.6 (L) 3.4 - 5.0 g/dL    Globulin 2.8 2.0 - 4.0 g/dL    A-G Ratio 0.9 0.8 - 1.7     LIPASE    Collection Time: 12/26/18  8:30 PM   Result Value Ref Range    Lipase 129 73 - 393 U/L   CARDIAC PANEL,(CK, CKMB & TROPONIN)    Collection Time: 12/26/18  8:30 PM   Result Value Ref Range    CK 28 (L) 39 - 308 U/L    CK - MB 2.1 <3.6 ng/ml    CK-MB Index 7.5 (H) 0.0 - 4.0 %    Troponin-I, QT 0.03 0.0 - 0.045 NG/ML   TYPE & SCREEN    Collection Time: 12/26/18  8:50 PM   Result Value Ref Range    Crossmatch Expiration 12/29/2018     ABO/Rh(D) A POSITIVE     Antibody screen NEG     CALLED TO: [facility] ER [id number] AT 2230     Unit number [id number]     Blood component type  LR     Unit division 00     Status of unit ALLOCATED     Crossmatch result Compatible     Unit number [id number]     Blood component type  LR     Unit division 00     Status of unit ALLOCATED     Crossmatch result Compatible        Radiologic Studies -   No orders to display     CT Results  (Last 48 hours)    None        CXR Results  (Last 48 hours)    None            Medical Decision Making   I am the first provider for this patient. I reviewed the vital signs, available nursing notes, past medical history, past surgical history, family history and social history. Vital Signs-Reviewed the patient's vital signs.     Pulse Oximetry Analysis - 100% on RA     Cardiac Monitor:  Rate: 120 bpm  Rhythm: tachycardia    EKG interpretation: (Preliminary)  8:16 PM   118 bpm tachycardia No STEMI  EKG read by Stevphen Opitz, PA-C at 9:09 PM     Records Reviewed: Nursing Notes and Old Medical Records    Provider Notes (Medical Decision Making):     Procedures:  Procedures  PROCEDURE NOTE - RECTAL EXAM:   8:30 PM  Performed by: Stevphen Opitz, PA-C  Rectal exam performed. dark stool was collected. Stool was Hemoccult tested, and found to be heme Positive. The procedure took 1-15 minutes, and pt tolerated well. Written by Philip Smith, ED Scribe, as dictated by Stevphen Opitz, PA-C .     ED Course:   8:14 PM Initial assessment performed. The patients presenting problems have been discussed, and they are in agreement with the care plan formulated and outlined with them. I have encouraged them to ask questions as they arise throughout their visit. 9:13 PM Discussed patient's history, exam, and available diagnostics results with ROBERT Dumont MD, gastroenterology, who recommends checking his H Pylori and giving him 1 unit of blood. 10:09 PM Discussed patient's history, exam, and available diagnostics results with Te Hope MD, internal medicine, who agrees to come evaluate the patient. Discussion:  34 y.o. male obese and with hx of CKD presents with fatigue, HA, SOB found to have GI bleed. Likely to do with increased Aleeve use recently. Will admit for severe anemia and GI bleed and blood transfusion. Diagnosis and Disposition     Critical Care Time:   I have spent 57 minutes of critical care time involved in lab review, consultations with specialist, family decision-making, and documentation. During this entire length of time I was immediately available to the patient. Critical Care:   The reason for providing this level of medical care for this critically ill patient was due a critical illness that impaired one or more vital organ systems such that there was a high probability of imminent or life threatening deterioration in the patients condition. This care involved high complexity decision making to assess, manipulate, and support vital system functions, to treat this degreee vital organ system failure and to prevent further life threatening deterioration of the patients condition. Core Measures:  For Hospitalized Patients:    1. Hospitalization Decision Time:  The decision to hospitalize the patient was made by Rosie Camejo PA-C at 9:13 PM on 12/26/2018    2. Aspirin: Aspirin was not given because the patient is a bleeding risk    10:10 PM  Patient is being admitted to the hospital by Melina Kirkland MD. The results of their tests and reasons for their admission have been discussed with them and/or available family. They convey agreement and understanding for the need to be admitted and for their admission diagnosis. CONDITIONS ON ADMISSION:  Sepsis is not present at the time of admission. Deep Vein Thrombosis is not present at the time of admission. Thrombosis is not present at the time of admission. Urinary Tract Infection is not present at the time of admission. Pneumonia is not present at the time of admission. MRSA is not present at the time of admission. Wound infection is not present at the time of admission. Pressure Ulcer is not present at the time of admission. CLINICAL IMPRESSION:    1. Gastrointestinal hemorrhage, unspecified gastrointestinal hemorrhage type    2. Chronic kidney disease, unspecified CKD stage    3. Anemia, unspecified type    4. Hyponatremia         PLAN:  1. Admit to telemetry  ___________________________    Attestations: This note is prepared by Tito Dunne, acting as Scribe for Rosie Camejo PA-C. Rosie Camejo PA-C:  The scribe's documentation has been prepared under my direction and personally reviewed by me in its entirety.   I confirm that the note above accurately reflects all work, treatment, procedures, and medical decision making performed by me.  _______________________________

## 2018-12-27 NOTE — ED TRIAGE NOTES
Pt arrives via ems stretcher with c\o chills, body aches, nausea x 1 day, pt is able to make needs known speaking in complete sentences, pt in nad at this time

## 2018-12-27 NOTE — PROGRESS NOTES
Admission Medication Reconciliation has been performed on this patient  admitted through the ED  yesterday consisting of interview of the patient regarding their PTA Home Medication List, Allergies and PMH as well as obtaining outpatient pharmacy information. Chief complaint:   Chief Complaint   Patient presents with    Chills    Fatigue    Nausea       Pt has PMH of   Past Medical History:   Diagnosis Date    ADD (attention deficit disorder)     Chest tightness 4/14/2011    Chronic kidney disease     Fatigue     Hyperlipidemia     Hypertension     Morbid obesity ([facility] [address]     Palpitation 4/14/2011    Psoriatic arthritis ([facility] [address]     Psychiatric disorder     anxiety    SOB (shortness of breath) on exertion 4/14/2011       Allergies consist of: Patient has no known allergies. Interviewed patient who was slightly sedated   Patient did  provide a written list of home medications. Medications are managed by: self and wife  [facility]'s outpatient [facility]     Patient ABX use within the past 30 days = marisa 875mg on 12/15/18 for leg infection     PAML was  marked complete and did  require modification. Admission orders have  already been written. Medication Compliance Issues and/or Medication Concerns:   Updated PAML to reflect pt is taking half of a Toprol XL 100mg in am and pm.  Also added proventil inhaler. Pt states he stopped taking his albuterol inhaler because he thought the palpatations he felt was d/t a drug interaction with other HTN meds, we discussed the common side effects of albuterol inhaler.               [address] [place] [address] Pharmacist  [phone]

## 2018-12-27 NOTE — PROGRESS NOTES
TRANSFER - IN REPORT:    Verbal report received from [address] [facility] on Nirmal Pugh  being received from Emergency Dept. for routine progression of care      Report consisted of patients Situation, Background, Assessment and   Recommendations(SBAR). Information from the following report(s) SBAR, Kardex, ED Summary, Procedure Summary, Intake/Output, MAR, Recent Results and Cardiac Rhythm Sinus Tach was reviewed with the receiving nurse. Opportunity for questions and clarification was provided. Assessment completed upon patients arrival to unit and care assumed. 2330: Assumed patient care from [address] [facility]. Patient is alert and oriented to person, place, time and situation. Respiratory status is stable on room air. Vital signs are stable. MEWS score is a two. Patient joelle any pain, discomfort, nausea vomiting dizziness or anxiety. White board and fall card is updated. Bed is locked and in lowest position. Call bell, water and personal belongings are within reach. Patient has no questions, comments or concerns after bedside shift report. 0030: Patient care was transferred to Spenser Fragoso RN. His respiratory status, vital signs and MEWS score remained stable. Patient was resting quietly with no signs of distress noted. Bed locked and in lowest position. Call bell water and personal belongings were within reach. Patient had no questions, comments or concerns after bedside shift report.  Bedside report given to Spenser Fragoso R.N.

## 2018-12-27 NOTE — ROUTINE PROCESS
TRANSFER - OUT REPORT:    Verbal report given to Micah Schulz RN(name) on Tristin Wharton  being transferred to [facility] (unit) for routine progression of care       Report consisted of patients Situation, Background, Assessment and   Recommendations(SBAR). Information from the following report(s) SBAR, ED Summary, Procedure Summary, Intake/Output, MAR, Recent Results and Cardiac Rhythm sinus tach  was reviewed with the receiving nurse. Lines:   Peripheral IV 12/26/18 Left Arm (Active)   Site Assessment Clean, dry, & intact 12/26/2018  8:27 PM   Phlebitis Assessment 0 12/26/2018  8:27 PM   Infiltration Assessment 0 12/26/2018  8:27 PM   Dressing Status Clean, dry, & intact 12/26/2018  8:27 PM   Dressing Type Transparent 12/26/2018  8:27 PM   Hub Color/Line Status Pink;Patent; Flushed 12/26/2018  8:27 PM   Action Taken Blood drawn 12/26/2018  8:27 PM        Opportunity for questions and clarification was provided.       Patient transported with:   Monitor  Registered Nurse

## 2018-12-27 NOTE — PROGRESS NOTES
Bedside shift change report given to 17[address] Eugene Wheeler (oncoming nurse) by Adrianne Osborne RN (offgoing nurse). Report included the following information SBAR, Kardex, ED Summary, Intake/Output, MAR, Accordion, Med Rec Status and Alarm Parameters .

## 2018-12-27 NOTE — PROGRESS NOTES
Bedside shift change report given to Viviana Grace [id number] (oncoming nurse) by Megan Elliott RN (offgoing nurse). Report included the following information SBAR, Kardex, ED Summary, Intake/Output, MAR, Accordion, Recent Results and Alarm Parameters . 0745 Assessment complete. Shift Summary: Shift uneventful. No complaints of chest pain or shortness of breath. Call light is within reach.

## 2018-12-27 NOTE — PROGRESS NOTES
Orders received. Chart reviewed. PT assessment not performed at this time as per nurse pt is about to receive another unit of blood and pt is SOB at rest at this time. Will attempt PT assessment at another time once pt's schedule allows.

## 2018-12-27 NOTE — PROGRESS NOTES
Problem: Falls - Risk of  Goal: *Absence of Falls  Document Daniela Fall Risk and appropriate interventions in the flowsheet. Outcome: Progressing Towards Goal  Fall Risk Interventions:  Mobility Interventions: Assess mobility with egress test         Medication Interventions: Assess postural VS orthostatic hypotension    Elimination Interventions: Bed/chair exit alarm             Problem: Pressure Injury - Risk of  Goal: *Prevention of pressure injury  Document Jono Scale and appropriate interventions in the flowsheet.   Outcome: Progressing Towards Goal  Pressure Injury Interventions:  Sensory Interventions: Assess changes in LOC    Moisture Interventions: Absorbent underpads    Activity Interventions: Assess need for specialty bed, PT/OT evaluation    Mobility Interventions: Assess need for specialty bed, HOB 30 degrees or less, Pressure redistribution bed/mattress (bed type)    Nutrition Interventions: Document food/fluid/supplement intake    Friction and Shear Interventions: Apply protective barrier, creams and emollients, HOB 30 degrees or less

## 2018-12-27 NOTE — PROGRESS NOTES
Patient set-up on Hospital Owned CPAP Machine with no Oxygen bleed-in. Tolerated well, breathing without difficulty.

## 2018-12-28 ENCOUNTER — HOME HEALTH ADMISSION (OUTPATIENT)
Dept: HOME HEALTH SERVICES | Facility: HOME HEALTH | Age: 38
End: 2018-12-28
Payer: COMMERCIAL

## 2018-12-28 LAB
ABO + RH BLD: NORMAL
BLD PROD TYP BPU: NORMAL
BLD PROD TYP BPU: NORMAL
BLOOD GROUP ANTIBODIES SERPL: NORMAL
BPU ID: NORMAL
BPU ID: NORMAL
CALLED TO:,BCALL1: NORMAL
CROSSMATCH RESULT,%XM: NORMAL
CROSSMATCH RESULT,%XM: NORMAL
H PYLORI IGA SER-ACNC: <9 UNITS (ref 0–8.9)
H PYLORI IGG SER IA-ACNC: <0.8 INDEX VALUE (ref 0–0.79)
SPECIMEN EXP DATE BLD: NORMAL
STATUS OF UNIT,%ST: NORMAL
STATUS OF UNIT,%ST: NORMAL
UNIT DIVISION, %UDIV: 0
UNIT DIVISION, %UDIV: 0

## 2018-12-28 PROCEDURE — 77030037875 HC DRSG MEPILEX <16IN BORD MOLN -A

## 2018-12-28 PROCEDURE — G0500 MOD SEDAT ENDO SERVICE >5YRS: HCPCS | Performed by: INTERNAL MEDICINE

## 2018-12-28 PROCEDURE — 88342 IMHCHEM/IMCYTCHM 1ST ANTB: CPT

## 2018-12-28 PROCEDURE — 99153 MOD SED SAME PHYS/QHP EA: CPT | Performed by: INTERNAL MEDICINE

## 2018-12-28 PROCEDURE — 99218 HC RM OBSERVATION: CPT

## 2018-12-28 PROCEDURE — 65660000000 HC RM CCU STEPDOWN

## 2018-12-28 PROCEDURE — 74011250636 HC RX REV CODE- 250/636

## 2018-12-28 PROCEDURE — 88305 TISSUE EXAM BY PATHOLOGIST: CPT

## 2018-12-28 PROCEDURE — 97162 PT EVAL MOD COMPLEX 30 MIN: CPT

## 2018-12-28 PROCEDURE — 74011636637 HC RX REV CODE- 636/637: Performed by: HOSPITALIST

## 2018-12-28 PROCEDURE — 97530 THERAPEUTIC ACTIVITIES: CPT

## 2018-12-28 PROCEDURE — 77030032490 HC SLV COMPR SCD KNE COVD -B: Performed by: INTERNAL MEDICINE

## 2018-12-28 PROCEDURE — 74011000250 HC RX REV CODE- 250: Performed by: INTERNAL MEDICINE

## 2018-12-28 PROCEDURE — 77030011256 HC DRSG MEPILEX <16IN NO BORD MOLN -A

## 2018-12-28 PROCEDURE — 74011250636 HC RX REV CODE- 250/636: Performed by: HOSPITALIST

## 2018-12-28 PROCEDURE — 74011250637 HC RX REV CODE- 250/637: Performed by: HOSPITALIST

## 2018-12-28 PROCEDURE — 76040000009: Performed by: INTERNAL MEDICINE

## 2018-12-28 PROCEDURE — 77030009426 HC FCPS BIOP ENDOSC BSC -B: Performed by: INTERNAL MEDICINE

## 2018-12-28 PROCEDURE — C9113 INJ PANTOPRAZOLE SODIUM, VIA: HCPCS | Performed by: HOSPITALIST

## 2018-12-28 RX ORDER — NALOXONE HYDROCHLORIDE 0.4 MG/ML
0.4 INJECTION, SOLUTION INTRAMUSCULAR; INTRAVENOUS; SUBCUTANEOUS
Status: DISCONTINUED | OUTPATIENT
Start: 2018-12-28 | End: 2018-12-28 | Stop reason: HOSPADM

## 2018-12-28 RX ORDER — ATROPINE SULFATE 0.1 MG/ML
0.5 INJECTION INTRAVENOUS
Status: CANCELLED | OUTPATIENT
Start: 2018-12-28 | End: 2018-12-29

## 2018-12-28 RX ORDER — SODIUM CHLORIDE 0.9 % (FLUSH) 0.9 %
5-10 SYRINGE (ML) INJECTION EVERY 8 HOURS
Status: CANCELLED | OUTPATIENT
Start: 2018-12-28

## 2018-12-28 RX ORDER — METOPROLOL TARTRATE 50 MG/1
50 TABLET ORAL 2 TIMES DAILY
Status: DISCONTINUED | OUTPATIENT
Start: 2018-12-28 | End: 2019-01-01 | Stop reason: HOSPADM

## 2018-12-28 RX ORDER — SODIUM CHLORIDE 0.9 % (FLUSH) 0.9 %
5-10 SYRINGE (ML) INJECTION AS NEEDED
Status: CANCELLED | OUTPATIENT
Start: 2018-12-28

## 2018-12-28 RX ORDER — DEXTROMETHORPHAN/PSEUDOEPHED 2.5-7.5/.8
1.2 DROPS ORAL
Status: CANCELLED | OUTPATIENT
Start: 2018-12-28

## 2018-12-28 RX ORDER — FLUMAZENIL 0.1 MG/ML
0.2 INJECTION INTRAVENOUS
Status: DISCONTINUED | OUTPATIENT
Start: 2018-12-28 | End: 2018-12-28 | Stop reason: HOSPADM

## 2018-12-28 RX ORDER — FENTANYL CITRATE 50 UG/ML
100 INJECTION, SOLUTION INTRAMUSCULAR; INTRAVENOUS
Status: DISCONTINUED | OUTPATIENT
Start: 2018-12-28 | End: 2018-12-28 | Stop reason: HOSPADM

## 2018-12-28 RX ORDER — DIPHENHYDRAMINE HYDROCHLORIDE 50 MG/ML
50 INJECTION, SOLUTION INTRAMUSCULAR; INTRAVENOUS ONCE
Status: CANCELLED | OUTPATIENT
Start: 2018-12-28 | End: 2018-12-28

## 2018-12-28 RX ORDER — EPINEPHRINE 0.1 MG/ML
1 INJECTION INTRACARDIAC; INTRAVENOUS
Status: CANCELLED | OUTPATIENT
Start: 2018-12-28 | End: 2018-12-29

## 2018-12-28 RX ORDER — MIDAZOLAM HYDROCHLORIDE 1 MG/ML
.25-5 INJECTION, SOLUTION INTRAMUSCULAR; INTRAVENOUS
Status: DISCONTINUED | OUTPATIENT
Start: 2018-12-28 | End: 2018-12-28 | Stop reason: HOSPADM

## 2018-12-28 RX ORDER — METOPROLOL TARTRATE 50 MG/1
50 TABLET ORAL 2 TIMES DAILY
Status: DISCONTINUED | OUTPATIENT
Start: 2018-12-28 | End: 2018-12-28

## 2018-12-28 RX ORDER — ONDANSETRON 4 MG/1
4 TABLET, ORALLY DISINTEGRATING ORAL
Status: DISCONTINUED | OUTPATIENT
Start: 2018-12-28 | End: 2019-01-01 | Stop reason: HOSPADM

## 2018-12-28 RX ORDER — SODIUM CHLORIDE 9 MG/ML
1000 INJECTION, SOLUTION INTRAVENOUS CONTINUOUS
Status: CANCELLED | OUTPATIENT
Start: 2018-12-28 | End: 2018-12-28

## 2018-12-28 RX ADMIN — SODIUM CHLORIDE 40 MG: 9 INJECTION INTRAMUSCULAR; INTRAVENOUS; SUBCUTANEOUS at 10:47

## 2018-12-28 RX ADMIN — ACETAMINOPHEN 650 MG: 325 TABLET ORAL at 11:56

## 2018-12-28 RX ADMIN — Medication 400 MG: at 11:37

## 2018-12-28 RX ADMIN — METOPROLOL TARTRATE 50 MG: 50 TABLET ORAL at 10:47

## 2018-12-28 RX ADMIN — METOPROLOL TARTRATE 50 MG: 50 TABLET ORAL at 21:30

## 2018-12-28 RX ADMIN — SODIUM CHLORIDE 40 MG: 9 INJECTION INTRAMUSCULAR; INTRAVENOUS; SUBCUTANEOUS at 21:29

## 2018-12-28 RX ADMIN — HYDROCHLOROTHIAZIDE 25 MG: 25 TABLET ORAL at 11:37

## 2018-12-28 RX ADMIN — ALFUZOSIN HYDROCHLORIDE 10 MG: 10 TABLET ORAL at 21:30

## 2018-12-28 RX ADMIN — ONDANSETRON 4 MG: 2 INJECTION INTRAMUSCULAR; INTRAVENOUS at 04:11

## 2018-12-28 RX ADMIN — PREDNISONE 20 MG: 20 TABLET ORAL at 11:37

## 2018-12-28 RX ADMIN — ONDANSETRON 4 MG: 4 TABLET, ORALLY DISINTEGRATING ORAL at 11:56

## 2018-12-28 RX ADMIN — LEVOTHYROXINE SODIUM 150 MCG: 75 TABLET ORAL at 21:29

## 2018-12-28 NOTE — PROGRESS NOTES
RESMED CPAP unit set-up and on S/By at bedside. Pt stating he may not wear secondary to c/o diarrhea.

## 2018-12-28 NOTE — HOME CARE
Spoke with patient. Verified demographics and explained services. Home care orders noted. Will follow through discharge. Samia Fuchs RN, BSN 
[employer]

## 2018-12-28 NOTE — WOUND CARE
Wound Care Progress Note New consult placed by Zane Serrano for \"multiple wounds to back and panus and is followed by home wound care. \" Assessment:  
Communication: A&O x 4 communicative Continent Independently repositions Diet: Clear liquid Pre-medicated for pain by RN. Bilateral heel, buttocks, and sacral skin intact and without erythema. Generalized edema and blanching erythema to lower legs and feet. Multiple circular small open areas to right flank and axilla, pt states these occur frequently at home and he has home health who assists with dressings. Base of wounds 100% fibrinous tissue, with moderate serous drainage. Wound Recommendations:   
Aqua cell ag (left at bedside) , mepilex border and secure with tape. Pt has sensitive skin, hypafix tape recommended, some left at bedside. Skin Care & Pressure Relief Recommendations: 
Minimize layers of linen/pads under patient to optimize support surface. Turn/reposition approximately every 2 hours and offload heels pillows or Prevalon boots. Manage incontinence / promote continence; Proshield to buttocks and sacrum daily and as needed with incontinence care Discussed above plan with patient & Marcelino CULVER, Frederic Barfield MD 
 
Transition of Care: Plan to follow weekly and per product recommendations while admitted to hospital.

## 2018-12-28 NOTE — ROUTINE PROCESS
Bedside shift change report given to Danette Hannon RN (oncoming nurse) by Garima Torres RN (offgoing nurse).  Report included the following information ANUSHKA Jha Mar  .

## 2018-12-28 NOTE — PROGRESS NOTES
1925: Assumed patient care from [address] [place] [address]. Patient is alert and oriented to person, place, time and situation. Respiratory status is stable on room air. Vital signs are stable. MEWS score is a two. Patient joelle any pain, discomfort, nausea vomiting dizziness or anxiety. White board and fall card is updated. Bed is locked and in lowest position. Call bell, water and personal belongings are within reach. Patient has no questions, comments or concerns after bedside shift report. 0800: Patient had an uneventful shift. Respiratory status, vital signs and MEWS score remained stable. Patient was resting quietly with no signs of distress noted. Bed locked and in lowest position. Call bell water and personal belongings were within reach. Patient had no questions, comments or concerns after bedside shift report. Bedside report given to BALAJI Longoria R.N.

## 2018-12-28 NOTE — PROGRESS NOTES
Problem: Mobility Impaired (Adult and Pediatric) Goal: *Acute Goals and Plan of Care (Insert Text) Physical Therapy Goals Initiated 12/28/2018 and to be accomplished within 5-7 day(s) 1. Patient will move from supine <> sit with mod I in prep for out of bed activity and change of position. 2.  Patient will perform sit<> stand with S with RW in prep for transfers/ambulation. 3.  Patient will transfer from bed <> chair with S/RW for time up in chair for completion of ADL activity. 4.  Patient will ambulate 100 feet with S/RW for improved functional mobility/safe discharge home. Outcome: Progressing Towards Goal 
physical Therapy EVALUATION Patient: Deanna Granda (20 y.o. male) Date: 12/28/2018 Primary Diagnosis: Acute blood loss anemia Procedure(s) (LRB): ESOPHAGOGASTRODUODENOSCOPY (EGD) (N/A) Day of Surgery Precautions:Fall ASSESSMENT : 
Based on the objective data described below, the patient is a 33 yo M seen on telemetryunit. Pt presents with overall decrease in motor performance (grossly 3+/5), decrease in activity tolerance, bed mobility and transfers. Pt reports usual ambulation with RW, lives with spouse in ground level apartment and uses w/c for out side home mobility. Activity limited by abdominal discomfort due to nausea at this time, with pt reporting pain 6/10 during session. Demonstrates bed mobility with supervision/SBA and additional time requiremment. Standing not attempted due to nausea. Pt left seated EOB with spouse present, with all needs in reach, and nurse Marcelino notified. Recommend rehab vs HHPT for follow up physical therapy upon discharge (pending progress). Pt may benefit from continued physical therapy to improve functional mobility and maximize independence to reach goals as stated. Pt Education: pt educated in safety/technique during functional mobility tasks Patient will benefit from skilled intervention to address the above impairments. Patients rehabilitation potential is considered to be Good Factors which may influence rehabilitation potential include:  
[]         None noted 
[]         Mental ability/status [x]         Medical condition 
[]         Home/family situation and support systems 
[]         Safety awareness [x]         Pain tolerance/management 
[]         Other: PLAN : 
Recommendations and Planned Interventions: 
[x]           Bed Mobility Training             []    Neuromuscular Re-Education 
[x]           Transfer Training                   []    Orthotic/Prosthetic Training 
[x]           Gait Training                          []    Modalities [x]           Therapeutic Exercises          []    Edema Management/Control 
[x]           Therapeutic Activities            [x]    Patient and Family Training/Education 
[]           Other (comment): Frequency/Duration: Patient will be followed by physical therapy 1-2 times per day to address goals. Discharge Recommendations: Rehab vs Home Health pending progress Further Equipment Recommendations for Discharge: N/A  
 
SUBJECTIVE:  
Patient stated I'm not the best. OBJECTIVE DATA SUMMARY:  
 
Past Medical History:  
Diagnosis Date  ADD (attention deficit disorder)  Chest tightness 4/14/2011  Chronic kidney disease  Fatigue  Hyperlipidemia  Hypertension  Morbid obesity ([facility]  Palpitation 4/14/2011  Psoriatic arthritis ([facility]  Psychiatric disorder   
 anxiety  SOB (shortness of breath) on exertion 4/14/2011 Past Surgical History:  
Procedure Laterality Date  HX TONSIL AND ADENOIDECTOMY  HX TONSILLECTOMY Barriers to Learning/Limitations: None Compensate with: N/A Prior Level of Function/Home Situation: amb with RW PTA, also has WC for use outside home Home Situation Home Environment: Apartment # Steps to Enter: 0 One/Two Story Residence: One story Living Alone: No 
 Support Systems: Spouse/Significant Other/Partner Patient Expects to be Discharged to[de-identified] IIAJNZI Current DME Used/Available at Home: Walker, rolling Tub or Shower Type: Tub/Shower combinationCritical Behavior: 
Neurologic State: Alert Orientation Level: Oriented X4 Cognition: Follows commands Psychosocial 
Patient Behaviors: Calm; Cooperative Family  Behaviors: Supportive Purposeful Interaction: Yes Pt Identified Daily Priority: Clinical issues (comment) Car[employer] Process: Establish trust;Teaching/learning; Attend basic human needs Caring Interventions: Reassure Reassure: Therapeutic listening; Informing;Caring rounds Therapeutic Modalities: Intentional therapeutic touch;HumorSkin Condition/Temp: Warm Family  Behaviors: Supportive Skin Integrity: Wound (add Wound LDA) Skin Integumentary Skin Color: Appropriate for ethnicity Skin Condition/Temp: Warm Skin Integrity: Wound (add Wound LDA) Turgor: Non-tenting Hair Growth: Present Varicosities: Absent Strength:   
Strength: Generally decreased, functional 
Tone & Sensation:  
Tone: Normal 
Sensation: Intact Range Of Motion: 
AROM: Generally decreased, functional 
PROM: Generally decreased, functional(LE's) Functional Mobility: 
Bed Mobility: 
Rolling: Modified independent Supine to Sit: Supervision; Additional time Scooting: Stand-by assistance;Supervision; Additional time Balance:  
Sitting: Intact; Without supportTherapeutic Exercises:  
Encouraged to perform AP's and HS on own Pain: 
Pain Scale 1: Numeric (0 - 10) Pain Intensity 1: 6 Pain Location 1: Abdomen Pain Orientation 1: Anterior Pain Description 1: (nausea) Pain Intervention(s) 1: Declines Activity Tolerance:  
Fair Please refer to the flowsheet for vital signs taken during this treatment. After treatment:  
[x]         Patient left in no apparent distress sitting up EOB []         Patient left in no apparent distress in bed 
[x]         Call bell left within reach [x]         Nursing notified-Marcelino 
[x]         Caregiver present-Spouse 
[]         Bed alarm activated COMMUNICATION/EDUCATION:  
[x]         Fall prevention education was provided and the patient/caregiver indicated understanding. [x]         Patient/family have participated as able in goal setting and plan of care. [x]         Patient/family agree to work toward stated goals and plan of care. []         Patient understands intent and goals of therapy, but is neutral about his/her participation. []         Patient is unable to participate in goal setting and plan of care. Eval Complexity: History: HIGH Complexity :3+ comorbidities / personal factors will impact the outcome/ POC Exam:MEDIUM Complexity : 3 Standardized tests and measures addressing body structure, function, activity limitation and / or participation in recreation  Presentation: MEDIUM Complexity : Evolving with changing characteristics  Clinical Decision Making:Medium Complexity demonstrates bed mobility with supervision/SBA Additional time; standing not attempted due to nausea and nurse in to give meds Overall Complexity:MEDIUM Thank you for this referral. 
Joselin Casanova, PT Time Calculation: 24 mins

## 2018-12-28 NOTE — PROGRESS NOTES
Hospitalist Progress Note Patient: Cheikh Walker MRN: [id number]  CSN: [id number] YOB: 1980  Age: 34 y.o. Sex: male DOA: 12/26/2018 LOS:  LOS: 2 days Chief Complaint: 
 
GI bleed Assessment/Plan 1.  Acute blood loss anemia. Hgb up after 2 units total transfusion-for EGD today 2.  Upper gastrointestinal bleed. EGD, stools are now Joelle Lanius 3.  Chronic kidney disease stage III. -follow daily BMP-stbale 4.  Focal segmental glomerulosclerosis. On PO prednsione every day and cellcept-but hold celcept as without food it hurts his stomach 5.  Psoriatic arthritis. not on enbrel any longer-no changes here 6.  Morbid obesity. PT has seen, no major needs 7.  Obstructive sleep apnea on CPAP-nightly CPinclusing HCTZ he takes every day to help edema 8.wounds right side-wound care has seen EGD today Can likley d/c home in 24 hrs depending on EGD findings-H&H is stable Disposition : 
Patient Active Problem List  
Diagnosis Code  Psoriatic arthritis ([facility] L40.50  HAWA (obstructive sleep apnea) G47.33  
 Morbidly obese (HCC) E66.01  
 HTN (hypertension) I10  
 Hypothyroidism E03.9  Stage 3 chronic kidney disease (HCC) N18.3  Acute blood loss anemia D62  
 GI bleed K92.2  FSGS (focal segmental glomerulosclerosis) N05.1 Subjective: 
 
Denies new issue Uncomfortable in bed Mild headache Diarrhea since l;ast night, brown stool Review of systems: 
 
Constitutional: denies fevers, chills, myalgias Respiratory: denies SOB Cardiovascular: denies chest pain, palpitations Gastrointestinal: denies nausea, vomiting Vital signs/Intake and Output: 
Visit Vitals /62 Pulse (!) 117 Temp 98.2 °F (36.8 °C) Resp 18 Ht 5' 9\" (1.753 m) Wt (!) 162.8 kg (358 lb 14.5 oz) SpO2 100% BMI 53.00 kg/m² Current Shift:  12/28 0701 - 12/28 1900 In: -  
Out: 200 [Urine:200] Last three shifts:  12/26 1901 - 12/28 0700 In: 2239 [P.O.:240; I.V.:1250] Out: 2500 [Urine:2500] Exam: 
 
General: morbidly obese WM, alert, NAD, OX3 
CVS:Regular rate and rhythm, no M/R/G, S1/S2 heard, no thrill Abdomen: Soft, Nontender, No distention, Normal Bowel sounds, No hepatomegaly Extremities: 3 plus edema LE BL 
Skin:multiple stretch marks all over, weeping open areas right flank and side Neuro:grossly normal , follows commands Psych:appropriate Labs: Results:  
   
Chemistry Recent Labs  
  12/27/18 0901 12/26/18 2030 GLU 77 120*  134* K 3.6 4.2  101 CO2 22 21 BUN 80* 82* CREA 2.07* 2.17* CA 7.9* 8.2* AGAP 11 12 BUCR 39 38 AP 40* 48  
TP 4.9* 5.4* ALB 2.4* 2.6*  
GLOB 2.5 2.8 AGRAT 1.0 0.9 CBC w/Diff Recent Labs  
  12/27/18 1945 12/27/18 0901 12/26/18 2030 WBC  --  5.4 6.6 RBC  --  2.42* 2.37* HGB 8.2* 7.1* 7.0*  
HCT 25.4* 22.6* 22.2*  
PLT  --  199 257 GRANS  --   --  88* LYMPH  --   --  9* EOS  --   --  0 Cardiac Enzymes Recent Labs  
  12/26/18 2030 CPK 28* CKND1 7.5* Coagulation No results for input(s): PTP, INR, APTT in the last 72 hours. No lab exists for component: INREXT Lipid Panel No results found for: CHOL, CHOLPOCT, CHOLX, CHLST, CHOLV, 471384, HDL, LDL, LDLC, DLDLP, 215175, VLDLC, VLDL, TGLX, TRIGL, TRIGP, TGLPOCT, CHHD, CHHDX  
BNP No results for input(s): BNPP in the last 72 hours. Liver Enzymes Recent Labs  
  12/27/18 0901 TP 4.9* ALB 2.4* AP 40* SGOT 21 Thyroid Studies Lab Results Component Value Date/Time TSH 0.09 (L) 12/26/2018 08:30 PM  
    
Procedures/imaging: see electronic medical records for all procedures/Xrays and details which were not copied into this note but were reviewed prior to creation of Plan Kathy Higgins MD

## 2018-12-28 NOTE — PERIOP NOTES
Post Endoscopy SBAR report Report given post procedure to: FLOOR RN Patient tolerated procedure well. Vital signs stable Findings: LARGE PRE PYLORIC ULCER, HIATAL HERNIA, SHORT SEGMENT DACOSTA'S ESOPHAGUS, HIGH GAG REFLEX. BIOPSY OBTAINED Topex spray at: 1545 NPO until:1645 Medications given in the procedure: 
Fentanyl  100  Mcg 
Versed    9   Mg

## 2018-12-28 NOTE — PROCEDURES
[facility] 
Esophagogastroduodenoscopy Procedure Report 
_______________________________________________________ Patient: Hardeep Muhammad Attending Physician: Bairon Vaz MD 
 
Patient ID: [id number] Referring Physician:  
 
 
Exam Date: 12/28/2018 
 _______________________________________________________ Introduction: A 34 y.o. Male, presents for an Esophagogastroduodenoscopy Procedure Indication: Chronic daily untreated GERD for years but in the last  Few weeks and particularly in the last 3 weeks he started having severe Epigastric pain aggravated by food with nausea and loss of appetite. He has been taking Prednisone x 3/ 2018 for Psoriasic arthritis. Started taking Aleve twice daily x 2 weeks to treat his epigastric pain. In the last week he has been having intermittent black stools. He was brought here by ambulance on Dec 26 because of severe weakness. He was found to be anemic down to 7 : Bairon Vaz MD 
 
Sedation:   
Versed 9 mg IV, fentanyl 100 mcg IV, topical Hurricaine spray Procedure Details: After infomed consent was obtained for the procedure, with all risks and benefits of procedure explained the patient was taken to the endoscopy suite and placed in the left lateral decubitus position. Following sequential administration of sedation as per above, the endoscope was inserted into the mouth and advanced under direct vision to fourth portion of the duodenum. A careful inspection was made as the gastroscope was withdrawn, including a retroflexed view of the proximal stomach; findings and interventions are described below. ASA status: III FINDINGS: 
HYPOPHARYNX AND LARYNX: Normal 
Esophagus: Normal proximal, middle esophagus. 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  4 biopsies taken from the Benjamin's, he bleeds easily no esophageal varices seen.There is 2 cm hiatal hernia between 41.5 and 43.5 cm. No erosive esophagitis. Diaphragmatic pinch located at 43.5 cm. Stomach: No blood, food or liquid retention. There is Mild erosive gastritis on the gastric body folds probably NSAID's induced. Prepyloric medium sized erosion and one large penetrating prepyloric 13 mm ulcer with clean margins and bed. No stigmata of recent bleeding. Normal, cardia, fundus, body, lesser curvature, incisura, antrum and pylorus. Duodenum/jejunum: The bulb, second, third, fourth portions and major papilla are normal 5 biopsies taken Therapies:   
biopsy of esophagus 
biopsy of stomach body, antrum 
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 4 biopsies taken, bleeds easily. 2 cm hiatal hernia between 41.5 and 43.5 cm. No erosive esophagitis. No evidence of blood in the stomach. Mild erosive gastritis on the gastric body folds probably NSAID's induced. Prepyloric medium sized erosion and one large penetrating prepyloric 13 mm ulcer with clean margins and bed. No stigmata of recent bleeding. No visible antral gastritis. 5 gastric biopsies taken to r/o H. Pylori infection. Normal cardia, lesser curvature. Pylorus, bulb, D2, D3, D4 and the major papilla. 5 duodenal biopsies taken to r/o the possibility of Celiac disease but the mucosa looked normal. High gag reflex but controlled with heavier sedation RECOMMENDATIONS: -Continue acid suppression with double dose proton pump inhibitor. , -Full liquid to soft diet and advance as tolerated later GERD diet: avoid fried and fatty foods. peppermint, chocolate, alcohol, coffee, citrus fruits and juices, tomoato products; avoid lying down for 2 to 3 hours after eating., -No more NSAID'S.  Probably he should be on PPI for life because of the chronic GERD and the short Benjamin's if confirmed on biopsy he would need to have repeat EGD in 5 years Assistant: none Belle Dalton MD 
12/28/2018 
3:17 PM

## 2018-12-28 NOTE — WOUND CARE
Home Care Instructions Wound Cleansing & Dressings Clean wound with Normal Saline/Dermal Wound Cleanser Apply to wound bed: Interactive [employer]or Cover with: Mepilex border and hypafix tape Change Dressing: As needed for drainage PLEASE CONTACT OFFICE AS SOON AS POSSIBLE IF UNABLE TO MAKE THIS APPOINTMENT. Inspect your wounds, looking for signs of infection which may include the following:  Increase in redness  Red \"streaks\" from wound  Increase in swelling  Fever  Unusual odor Change in the amount of wound drainage. Should you experience any significant changes in your wound(s) or have any questions regarding your home care instructions please contact the wound center or your home health company. If after regular business hours, please call your family doctor or local emergency room. Edema Control:   Elevate legs as much as possible. Avoid standing in one position for more than 10 minutes. Avoid setting with legs down. Do not cross legs while sitting. Off-Loading:     Frequent position changes. Do not cross legs while sitting. Shift weight every 20 minutes or more when sitting for prolonged periods of time.

## 2018-12-29 ENCOUNTER — APPOINTMENT (OUTPATIENT)
Dept: NUCLEAR MEDICINE | Age: 38
DRG: 393 | End: 2018-12-29
Attending: INTERNAL MEDICINE
Payer: COMMERCIAL

## 2018-12-29 ENCOUNTER — APPOINTMENT (OUTPATIENT)
Dept: CT IMAGING | Age: 38
DRG: 393 | End: 2018-12-29
Attending: INTERNAL MEDICINE
Payer: COMMERCIAL

## 2018-12-29 LAB
B-HEM STREP THROAT QL CULT: NEGATIVE
B-HEM STREP THROAT QL CULT: NORMAL
BACTERIA SPEC CULT: NORMAL
BASOPHILS # BLD: 0 K/UL (ref 0–0.1)
BASOPHILS NFR BLD: 0 % (ref 0–3)
DIFFERENTIAL METHOD BLD: ABNORMAL
EOSINOPHIL # BLD: 0 K/UL (ref 0–0.4)
EOSINOPHIL NFR BLD: 1 % (ref 0–5)
ERYTHROCYTE [DISTWIDTH] IN BLOOD BY AUTOMATED COUNT: 15.4 % (ref 11.6–14.5)
HCT VFR BLD AUTO: 22.7 % (ref 36–48)
HCT VFR BLD AUTO: 23.1 % (ref 36–48)
HCT VFR BLD AUTO: 23.7 % (ref 36–48)
HGB BLD-MCNC: 7.2 G/DL (ref 13–16)
HGB BLD-MCNC: 7.4 G/DL (ref 13–16)
HGB BLD-MCNC: 7.6 G/DL (ref 13–16)
LYMPHOCYTES # BLD: 0.5 K/UL (ref 0.8–3.5)
LYMPHOCYTES NFR BLD: 10 % (ref 20–51)
MCH RBC QN AUTO: 30.6 PG (ref 24–34)
MCHC RBC AUTO-ENTMCNC: 32.1 G/DL (ref 31–37)
MCV RBC AUTO: 95.6 FL (ref 74–97)
MONOCYTES # BLD: 0.5 K/UL (ref 0–1)
MONOCYTES NFR BLD: 11 % (ref 2–9)
NEUTS BAND NFR BLD MANUAL: 2 % (ref 0–5)
NEUTS SEG # BLD: 3.5 K/UL (ref 1.8–8)
NEUTS SEG NFR BLD: 76 % (ref 42–75)
PLATELET # BLD AUTO: 242 K/UL (ref 135–420)
PMV BLD AUTO: 8.5 FL (ref 9.2–11.8)
RBC # BLD AUTO: 2.48 M/UL (ref 4.7–5.5)
RBC MORPH BLD: ABNORMAL
SERVICE CMNT-IMP: NORMAL
T4 FREE SERPL-MCNC: 1.2 NG/DL (ref 0.7–1.5)
WBC # BLD AUTO: 4.6 K/UL (ref 4.6–13.2)

## 2018-12-29 PROCEDURE — 99218 HC RM OBSERVATION: CPT

## 2018-12-29 PROCEDURE — 84439 ASSAY OF FREE THYROXINE: CPT

## 2018-12-29 PROCEDURE — 65660000000 HC RM CCU STEPDOWN

## 2018-12-29 PROCEDURE — C9113 INJ PANTOPRAZOLE SODIUM, VIA: HCPCS | Performed by: HOSPITALIST

## 2018-12-29 PROCEDURE — 74011250636 HC RX REV CODE- 250/636: Performed by: HOSPITALIST

## 2018-12-29 PROCEDURE — 97116 GAIT TRAINING THERAPY: CPT

## 2018-12-29 PROCEDURE — 74011250637 HC RX REV CODE- 250/637: Performed by: HOSPITALIST

## 2018-12-29 PROCEDURE — 74011636637 HC RX REV CODE- 636/637: Performed by: HOSPITALIST

## 2018-12-29 PROCEDURE — 85018 HEMOGLOBIN: CPT

## 2018-12-29 PROCEDURE — 82784 ASSAY IGA/IGD/IGG/IGM EACH: CPT

## 2018-12-29 PROCEDURE — 36415 COLL VENOUS BLD VENIPUNCTURE: CPT

## 2018-12-29 PROCEDURE — 97530 THERAPEUTIC ACTIVITIES: CPT

## 2018-12-29 PROCEDURE — 74176 CT ABD & PELVIS W/O CONTRAST: CPT

## 2018-12-29 PROCEDURE — A9560 TC99M LABELED RBC: HCPCS

## 2018-12-29 PROCEDURE — 74011250636 HC RX REV CODE- 250/636: Performed by: INTERNAL MEDICINE

## 2018-12-29 PROCEDURE — C9113 INJ PANTOPRAZOLE SODIUM, VIA: HCPCS | Performed by: INTERNAL MEDICINE

## 2018-12-29 PROCEDURE — 85025 COMPLETE CBC W/AUTO DIFF WBC: CPT

## 2018-12-29 PROCEDURE — 77030037878 HC DRSG MEPILEX >48IN BORD MOLN -B

## 2018-12-29 PROCEDURE — 77030011256 HC DRSG MEPILEX <16IN NO BORD MOLN -A

## 2018-12-29 PROCEDURE — 77030037875 HC DRSG MEPILEX <16IN BORD MOLN -A

## 2018-12-29 RX ORDER — SODIUM CHLORIDE 9 MG/ML
INJECTION INTRAMUSCULAR; INTRAVENOUS; SUBCUTANEOUS
Status: DISPENSED
Start: 2018-12-29 | End: 2018-12-29

## 2018-12-29 RX ADMIN — ACETAMINOPHEN 650 MG: 325 TABLET ORAL at 17:08

## 2018-12-29 RX ADMIN — LEVOTHYROXINE SODIUM 150 MCG: 75 TABLET ORAL at 22:07

## 2018-12-29 RX ADMIN — SODIUM CHLORIDE 40 MG: 9 INJECTION INTRAMUSCULAR; INTRAVENOUS; SUBCUTANEOUS at 09:22

## 2018-12-29 RX ADMIN — HYDROCHLOROTHIAZIDE 25 MG: 25 TABLET ORAL at 09:23

## 2018-12-29 RX ADMIN — SODIUM CHLORIDE 40 MG: 9 INJECTION INTRAMUSCULAR; INTRAVENOUS; SUBCUTANEOUS at 22:07

## 2018-12-29 RX ADMIN — ACETAMINOPHEN 650 MG: 325 TABLET ORAL at 01:05

## 2018-12-29 RX ADMIN — Medication 400 MG: at 12:14

## 2018-12-29 RX ADMIN — PREDNISONE 20 MG: 20 TABLET ORAL at 12:14

## 2018-12-29 RX ADMIN — METOPROLOL TARTRATE 50 MG: 50 TABLET ORAL at 09:23

## 2018-12-29 RX ADMIN — ALFUZOSIN HYDROCHLORIDE 10 MG: 10 TABLET ORAL at 22:07

## 2018-12-29 RX ADMIN — PANTOPRAZOLE SODIUM 40 MG: 40 INJECTION, POWDER, FOR SOLUTION INTRAVENOUS at 04:24

## 2018-12-29 RX ADMIN — ONDANSETRON 4 MG: 4 TABLET, ORALLY DISINTEGRATING ORAL at 01:05

## 2018-12-29 RX ADMIN — METOPROLOL TARTRATE 50 MG: 50 TABLET ORAL at 22:07

## 2018-12-29 NOTE — ROUTINE PROCESS
Bedside and Verbal shift change report given to Diane Montesinos RN (oncoming nurse) by Therese Crooks RN (offgoing nurse). Report included the following information SBAR and Kardex.

## 2018-12-29 NOTE — PROGRESS NOTES
Problem: Mobility Impaired (Adult and Pediatric) Goal: *Acute Goals and Plan of Care (Insert Text) Physical Therapy Goals Initiated 12/28/2018 and to be accomplished within 5-7 day(s) 1. Patient will move from supine <> sit with mod I in prep for out of bed activity and change of position. 2.  Patient will perform sit<> stand with S with RW in prep for transfers/ambulation. 3.  Patient will transfer from bed <> chair with S/RW for time up in chair for completion of ADL activity. 4.  Patient will ambulate 100 feet with S/RW for improved functional mobility/safe discharge home. Outcome: Not Progressing Towards Goal 
physical Therapy TREATMENT Patient: Shu Brooks (17 y.o. male) Date: 12/29/2018 Diagnosis: Acute blood loss anemia Acute blood loss anemia Procedure(s) (LRB): ESOPHAGOGASTRODUODENOSCOPY (EGD), BIOPSY (N/A) 1 Day Post-Op Precautions: Fall Chart, physical therapy assessment, plan of care and goals were reviewed. ASSESSMENT: 
Pt is presents with increased lethargy, but willing to participate in PT session. Pt was able to transition to EOB without difficulty and was able to amb 30' before resting. Pt reported lightheadedness post trial. BP of 129/65. 8 min rest break, before second amb trial of 50'. BP of 115/61. Pt notes significant fatigue post amb, but less dizziness. Pt is improving with OOB activity. Will continue to advance activity as tolerated. RN and CM informed. Progression toward goals: 
[]      Improving appropriately and progressing toward goals [x]      Improving slowly and progressing toward goals 
[]      Not making progress toward goals and plan of care will be adjusted PLAN: 
Patient continues to benefit from skilled intervention to address the above impairments. Continue treatment per established plan of care. Discharge Recommendations:  Home Health or TBD Further Equipment Recommendations for Discharge:  bedside commode and Bariatric rolling walker SUBJECTIVE:  
Patient stated If I'm suppoed to be going home, I need to be able to walk. Right?  OBJECTIVE DATA SUMMARY:  
Critical Behavior: 
Neurologic State: Alert, Appropriate for age Orientation Level: Oriented X4 Cognition: Appropriate decision making, Appropriate for age attention/concentration, Appropriate safety awareness, Follows commands Functional Mobility Training: 
Bed Mobility: 
Rolling: Modified independent Supine to Sit: Supervision Scooting: Supervision Transfers: 
Sit to Stand: Supervision Stand to Sit: Supervision Balance: 
Sitting: Intact Standing: Intact; With supportAmbulation/Gait Training: 
Distance (ft): 80 Feet (ft)(30+50) Assistive Device: Gait belt;Walker, rolling Ambulation - Level of Assistance: Supervision Gait Abnormalities: Ataxic Base of Support: Widened Speed/Mikaela: Slow Step Length: Right shortened;Left shortened Swing Pattern: Right symmetrical;Left symmetrical 
Pain: 
Pain Scale 1: Numeric (0 - 10) Pain Intensity 1: 6 (Abdominal) Pain out: 6 Activity Tolerance:  
Fair Please refer to the flowsheet for vital signs taken during this treatment. After treatment:  
[x] Patient left in no apparent distress sitting EOB [] Patient left in no apparent distress in bed 
[x] Call bell left within reach [x] Nursing notified 
[x] Caregiver present 
[] Bed alarm activated Katheryn Plane, PTA Time Calculation: 41 mins

## 2018-12-29 NOTE — PROGRESS NOTES
1920: Assumed patient care from BALAJI Sarah RN. Patient is alert and oriented to person, place, time and situation. Respiratory status is stable on room air. Vital signs are stable. MEWS score is a one. Patient joelle any pain, discomfort, nausea vomiting dizziness or anxiety. White board and fall card is updated. Bed is locked and in lowest position. Call bell, water and personal belongings are within reach. Patient has no questions, comments or concerns after bedside shift report. 2019: Rounded on the patient. He is watching television in bed with no signs of distress noted. Patient's call bell, water and personal belongings are within reach. His bed is locked and in the lowest position. The bed alarm is on for patient safety. He has no requests at this time. 2122: Rounded on the patient. He is watching television in bed with no signs of distress noted. Patient's call bell, water and personal belongings are within reach. His bed is locked and in the lowest position. The bed alarm is on for patient safety. He has no requests at this time. 2221: Rounded on the patient. He appears to be sleeping in bed with no signs of distress noted. Patient's call bell, water and personal belongings are within reach. His bed is locked and in the lowest position. The bed alarm is on for patient safety. 2330: Patient had an uneventful shift. Respiratory status, vital signs and MEWS score remained stable. Patient was resting quietly with no signs of distress noted. Bed locked and in lowest position. Call bell water and personal belongings were within reach. Patient had no questions, comments or concerns after bedside shift report.  Bedside report given to Liz Graves R.N.

## 2018-12-29 NOTE — ROUTINE PROCESS
Bedside and Verbal shift change report given to Jose Allison RN (oncoming nurse) by Malou Weir RN (offgoing nurse). Report included the following information SBAR and Kardex.

## 2018-12-29 NOTE — PROGRESS NOTES
Hospitalist Progress Note Patient: Bing Pope MRN: [id number]  CSN: [id number] YOB: 1980  Age: 34 y.o. Sex: male DOA: 12/26/2018 LOS:  LOS: 3 days Assessment and Plan: 
 
Principal Problem: 
  Acute blood loss anemia (12/26/2018) Active Problems: 
  Psoriatic arthritis ([facility] () 
 
  HAWA (obstructive sleep apnea) (11/15/2018) Morbidly obese ([facility] (11/15/2018) HTN (hypertension) (11/15/2018) Hypothyroidism (11/15/2018) Stage 3 chronic kidney disease ([facility].) (11/15/2018) GI bleed (12/26/2018) FSGS (focal segmental glomerulosclerosis) (12/26/2018) 1.  Acute blood loss anemia. Hgb up after 2 units total transfusion- it is drifting down however 2.  Upper gastrointestinal bleed. EGD findings as noted by GI. Needs to be on PPi . Will change to oral tomorrow 3.  Chronic kidney disease stage III. From FSGS: this is stable  -follow daily BMP-stbale 4.   Psoriatic arthritis. not on enbrel any longer-no changes here 5  Morbid obesity 6. .  Obstructive sleep apnea on CPAP-nightly 7. HTN:  HCTZ he takes every day to help edema 8.wounds right side-wound care has seen 
  
 
Ryan gonzalez d/c home tomorrow if Hb is stable Chief complaint:  GI bleed Subjective: 
 
Eating some but still compains of abdominal pain Review of systems: 
 
General: No fevers or chills. Cardiovascular: No chest pain or pressure. No palpitations. Pulmonary: No shortness of breath. Gastrointestinal: No nausea, vomiting. Objective: 
 
Vital signs/Intake and Output: 
Visit Vitals /51 Pulse 91 Temp 97.9 °F (36.6 °C) Resp 20 Ht 5' 9\" (1.753 m) Wt (!) 162.8 kg (358 lb 14.5 oz) SpO2 99% BMI 53.00 kg/m² Current Shift:  No intake/output data recorded. Last three shifts:  12/27 1901 - 12/29 0700 In: 480 [P.O.:480] Out: 1625 [[id number]] Physical Exam: 
General: NAD, AAOx3. Non-toxic. HEENT: NC/AT. ORION CHAMBERLAIN.  MMM. Lungs: Nml inspection. CTA B/L. No wheezing, rales or rhonchi. Heart:  S1S2 RRR,  PMI mid 5th IC space. No M/RG. Abdomen: Soft, NT/ND.  BS+. No peritoneal signs. Extremities: No C/C/E. Psych:   Nml affect. Neurologic:  2-12 intact. Strength 5/5 throughout. Sensation symmetrical. 
 
 
 
 
Labs: Results:  
   
Chemistry Recent Labs  
  12/27/18 0901 12/26/18 2030 GLU 77 120*  134* K 3.6 4.2  101 CO2 22 21 BUN 80* 82* CREA 2.07* 2.17* CA 7.9* 8.2* AGAP 11 12 BUCR 39 38 AP 40* 48  
TP 4.9* 5.4* ALB 2.4* 2.6*  
GLOB 2.5 2.8 AGRAT 1.0 0.9 CBC w/Diff Recent Labs  
  12/29/18 
1038 12/29/18 
0522 12/27/18 
1945 12/27/18 0901 12/26/18 2030 WBC  --  4.6  --  5.4 6.6 RBC  --  2.48*  --  2.42* 2.37* HGB 7.2* 7.6* 8.2* 7.1* 7.0*  
HCT 22.7* 23.7* 25.4* 22.6* 22.2*  
PLT  --  242  --  199 257 GRANS  --  76*  --   --  88* LYMPH  --  10*  --   --  9* EOS  --  1  --   --  0 Cardiac Enzymes Recent Labs  
  12/26/18 2030 CPK 28* CKND1 7.5* Coagulation No results for input(s): PTP, INR, APTT in the last 72 hours. No lab exists for component: INREXT Lipid Panel No results found for: CHOL, CHOLPOCT, CHOLX, CHLST, CHOLV, 836109, HDL, LDL, LDLC, DLDLP, 620414, VLDLC, VLDL, TGLX, TRIGL, TRIGP, TGLPOCT, CHHD, CHHDX  
BNP No results for input(s): BNPP in the last 72 hours. Liver Enzymes Recent Labs  
  12/27/18 0901 TP 4.9* ALB 2.4* AP 40* SGOT 21 Thyroid Studies Lab Results Component Value Date/Time TSH 0.09 (L) 12/26/2018 08:30 PM  
    
Procedures/imaging: see electronic medical records for all procedures/Xrays and details which were not copied into this note but were reviewed prior to creation of Plan

## 2018-12-29 NOTE — ROUTINE PROCESS
Shift summary:  
1650: Paged Dr. Yaima Luciano and notified him that pt has had two small bloody stools within 20 mintues. Dr. Yaima Luciano stated to have patient receive NM acute blood scan STAT. 1655: Paged on call technician for [facility]. 
1656: Received phone call from [facility] that it will be a 2 hour wait before pt to receive scan. 1945: Pt transported to [facility] via bed.

## 2018-12-29 NOTE — PROGRESS NOTES
0001-Resting quietly in bed with wife at bedside in reclining chair; snoring, arouses easily. Stable. Mepilex, left thigh in place, and right lateral side of abdomen. Stretch marks noted to arms,chest, abdomen. Some redness noted under fold of abdomen and stretch marks. No open areas noted to buttocks. Stable. Tele-box in place. Bedside commode being utilized. Assessment complete. White board updated. Call light and personal items within reach. 0420-Spoke with Dr. Saloni Muniz regarding patient complaint of abdominal pain and blood noted with stool. Orders given. 0450-Medic to floor to attempt new IV, complains of hurting with flush. 0547-Some anxiety noted. Wife at bedside. No other change from initial assessment. Shift Summary:  CT done during shift. Rectal bleeding noted with stools twice during shift. Spoke with GI doctor during shift.

## 2018-12-29 NOTE — PROGRESS NOTES
1645 - Received patient back from Endo. Patient drowsy, but arouseable. 2015 - Dr. Rose Daniel paged regarding bolus. 2030 - Dr. Rose Daniel paged again. 2038 - Spoke with Dr. Rose Daniel about improved BP. Received permission to hold bolus.

## 2018-12-29 NOTE — CONSULTS
[place] Name:Rabia AQUINO. 
MR#: [id number] : 1980 ACCOUNT #: [de-identified] DATE OF SERVICE: 2018 TIME:  9:00 p.m. SUMMARY:  This is a 78-year-old male who was admitted 2 days ago because of upper GI bleeding and symptomatic acute blood loss anemia. The patient was feeling weak. He claims he had for the past week he has been having frequent black stools. The patient has been taking at least twice daily for the past 2 weeks because of epigastric pain that started a few weeks ago, but it got more severe in the last 3 weeks to the point that he was afraid to eat and lost a few pounds. The patient has chronic kidney disease stage III. Used to take a lot of NSAIDs for psoriasis arthritis, but this was stopped at least 2 years ago when he resumed it recently. He has also focal segmental glomerulosclerosis, morbid obesity, obstructive sleep apnea, hypertension. He has been on steroids since 2018 for psoriatic arthritis, used to take Enbrel, but this has to be stopped because of chronic kidney disease. The patient has no diabetes mellitus. He does not smoke or abuse alcohol. He quit smoking about 10 years ago. He has no history of alcohol abuse in the past.  He has no known history of liver disease, peptic ulcer disease. He claims, however, that he has been having chronic GERD for many years, which he has not been taking any medication. Occasionally, he may have some dysphagia to solids. He has been complaining of nausea, but there was no vomiting. The patient claims he has been feeling very weak, dizzy. He was unable to get up and his wife had to call the ambulance for him, but did not fall or pass out. He did not have any shortness of breath or chest pain. He has no coronary artery disease, congestive heart failure or history of stroke. He has no prior abdominal surgery. He lives with his wife. ALLERGIES:  NO KNOWN DRUG ALLERGIES. HOME MEDICATIONS:  He was taking albuterol, hydrochlorothiazide 25 mg, metoprolol XL 50 mg b.i.d., Synthroid 150 mcg daily, Q-Enzyme 200 mg, prednisone 20 mg, Uroxatral or alfuzosin SR 10 mg, magnesium, CellCept 1000 mg twice daily, Imodium 2 every 12 hours. FUNCTIONAL INQUIRY:  The patient claimed that he has chronic diarrhea. He has between 2-5 bowel movement every day, but when he eats, he is very intolerant to bread and any kind of yeast which gives him more diarrhea. He has no family history of celiac and he was never told to have celiac disease. He is able to eat pasta, but anything with yeast will give him diarrhea. HE IS ALSO LACTOSE INTOLERANT. Besides his epigastric pain, he has been complaining also of lower abdominal constant burning sensation that sometimes relieved by defecation. He has no prior history of constipation. There is no rectal bleeding, but his wife claims that about a week ago when she looked at his stools, they looked more burgundy. More recently, the stool has been dark. PHYSICAL EXAMINATION: 
GENERAL:  Morbidly obese  male who appears to be uncomfortable. He has considerable cushingoid appearance with stretch marks compatible with obesity and also questionable Cushing syndrome. He has known her face with buffalo hump. He appears to be pale. He weighs 250 pounds. He can barely move in the bed, but moves all his 4 extremities. NEUROLOGIC:  Alert and oriented. VITAL SIGNS:  Temperature 98.2, but he complains of being constantly cold. Pulse was  and sinus tachycardia, blood pressure 126/62, breathing 18, saturation 100%. HEENT:  Eyes are remarkable for pale conjunctivae. The sclerae are anicteric. The pupils are equal as well as oropharyngeal cavity is pale and moist mucous membrane. Normal teeth. NECK:  Supple. No palpable mass but short. LUNGS:  Clear to auscultation. HEART:  Rhythm is regular.   S1, S2 normal. 
 ABDOMEN:  Obese, huge, soft. There is mild epigastric tenderness, but there is no guarding or obvious mass. Bowel sounds are normal. 
EXTREMITIES:  He has a mild to moderate leg edema. He is a tattoo on his back. He also has some small lesions of psoriasis throughout all his body that is very mild. LABORATORY DATA:  Hemoglobin when he arrived was 8.9 when it was 9.6 on 11/16/2018. His hemoglobin dropped to 7. He received 2 blood transfusions and presently, his hemoglobin is 8.2. He has normal MCV and MCH. Platelet is 281 and 841, but they were 127 and 123 in 11/2018. He has 88% neutrophils, normal WBC. Normal coagulation on 02/14/2018. Complete metabolic panel: We have a sodium 134, BUN 82, creatinine 2.17. Today, they are 80 and 2.07 after hydration. LFTs we have albumin is 2.6. CPK-MB index is 7.5, but CPK, CPK-MB and troponin are all normal.  Tox screen was negative on 11/16/2018. Stool culture was negative for Streptococcus. This patient had a liver biopsy on 02/20/2018 that showed focal and segmental glomerulosclerosis, moderate degree of interstitial scarring, mild atherosclerosis, but there were no signs of glomerulonephritis. We do not have any CT scan of the abdomen. CONCLUSION:  This is a 47-year-old male who has psoriatic arthritis, used to take for years Aleve that caused him to have a chronic kidney disease. This was stopped 2 years ago, but was resumed 2 weeks ago because of severe epigastric pain that started about 3 or more weeks ago with severe chronic heartburn for many years. In my opinion, until proven otherwise, this patient has peptic ulcer disease on top of his erosive esophagitis and this has been exacerbated by the intake of NSAIDS, which cause him to have an upper GI bleeding with acute blood loss anemia. His hemoglobin dropped to 7, requiring 2 blood transfusions.   We are going to perform an EGD to check his stomach and also his esophagus. I explained this to the patient and he agreed to proceed. He has considerable other comorbidities including his chronic kidney disease, psoriatic arthritis on CellCept and prednisone. He has Cushing syndrome, heterogenic caused by the chronic intake of steroids. He is morbidly obese, has some asthma. He does have mild chronic diarrhea and I would not be surprised that he has celiac disease on top of this. While in the small bowel, I will be taking biopsies of the small bowel to rule out this possibility. We also need to check his Helicobacter pylori status and treat him if positive. Should avoid any nephrotoxic medication. I told him that he should never take any NSAIDs in the future. Most likely, he needs to be on long-term PPI to treat his erosive esophagitis, even if he has chronic kidney disease. He should lose some weight. Further note to follow. JUAN ALBERTO Rushing MD MBE / SHANIQUA 
D: 12/28/2018 15:40 T: 12/28/2018 22:32 
JOB #: [id number]

## 2018-12-30 ENCOUNTER — APPOINTMENT (OUTPATIENT)
Dept: NUCLEAR MEDICINE | Age: 38
DRG: 393 | End: 2018-12-30
Attending: INTERNAL MEDICINE
Payer: COMMERCIAL

## 2018-12-30 LAB
ANION GAP SERPL CALC-SCNC: 9 MMOL/L (ref 3–18)
BASOPHILS # BLD: 0 K/UL (ref 0–0.1)
BASOPHILS NFR BLD: 0 % (ref 0–3)
BUN SERPL-MCNC: 44 MG/DL (ref 7–18)
BUN/CREAT SERPL: 22
CALCIUM SERPL-MCNC: 7.9 MG/DL (ref 8.5–10.1)
CHLORIDE SERPL-SCNC: 107 MMOL/L (ref 100–108)
CO2 SERPL-SCNC: 24 MMOL/L (ref 21–32)
CREAT SERPL-MCNC: 1.99 MG/DL (ref 0.6–1.3)
DIFFERENTIAL METHOD BLD: ABNORMAL
EOSINOPHIL # BLD: 0 K/UL (ref 0–0.4)
EOSINOPHIL NFR BLD: 0 % (ref 0–5)
ERYTHROCYTE [DISTWIDTH] IN BLOOD BY AUTOMATED COUNT: 15.3 % (ref 11.6–14.5)
GLUCOSE SERPL-MCNC: 86 MG/DL (ref 74–99)
HCT VFR BLD AUTO: 21.9 % (ref 36–48)
HCT VFR BLD AUTO: 22.9 % (ref 36–48)
HCT VFR BLD AUTO: 23.1 % (ref 36–48)
HGB BLD-MCNC: 7 G/DL (ref 13–16)
HGB BLD-MCNC: 7.2 G/DL (ref 13–16)
HGB BLD-MCNC: 7.3 G/DL (ref 13–16)
LYMPHOCYTES # BLD: 0.5 K/UL (ref 0.8–3.5)
LYMPHOCYTES NFR BLD: 11 % (ref 20–51)
MCH RBC QN AUTO: 30.1 PG (ref 24–34)
MCHC RBC AUTO-ENTMCNC: 31.4 G/DL (ref 31–37)
MCV RBC AUTO: 95.8 FL (ref 74–97)
METAMYELOCYTES NFR BLD MANUAL: 1 %
MONOCYTES # BLD: 0.3 K/UL (ref 0–1)
MONOCYTES NFR BLD: 7 % (ref 2–9)
NEUTS BAND NFR BLD MANUAL: 2 % (ref 0–5)
NEUTS SEG # BLD: 3.7 K/UL (ref 1.8–8)
NEUTS SEG NFR BLD: 79 % (ref 42–75)
PLATELET # BLD AUTO: 221 K/UL (ref 135–420)
PMV BLD AUTO: 9.6 FL (ref 9.2–11.8)
POTASSIUM SERPL-SCNC: 3.8 MMOL/L (ref 3.5–5.5)
RBC # BLD AUTO: 2.39 M/UL (ref 4.7–5.5)
RBC MORPH BLD: ABNORMAL
SODIUM SERPL-SCNC: 140 MMOL/L (ref 136–145)
WBC # BLD AUTO: 4.7 K/UL (ref 4.6–13.2)

## 2018-12-30 PROCEDURE — 74011250636 HC RX REV CODE- 250/636: Performed by: HOSPITALIST

## 2018-12-30 PROCEDURE — 80048 BASIC METABOLIC PNL TOTAL CA: CPT

## 2018-12-30 PROCEDURE — 85018 HEMOGLOBIN: CPT

## 2018-12-30 PROCEDURE — 74011000250 HC RX REV CODE- 250: Performed by: INTERNAL MEDICINE

## 2018-12-30 PROCEDURE — 74011636637 HC RX REV CODE- 636/637: Performed by: HOSPITALIST

## 2018-12-30 PROCEDURE — C9113 INJ PANTOPRAZOLE SODIUM, VIA: HCPCS | Performed by: HOSPITALIST

## 2018-12-30 PROCEDURE — 74011250637 HC RX REV CODE- 250/637: Performed by: INTERNAL MEDICINE

## 2018-12-30 PROCEDURE — 78278 ACUTE GI BLOOD LOSS IMAGING: CPT

## 2018-12-30 PROCEDURE — 74011250637 HC RX REV CODE- 250/637: Performed by: HOSPITALIST

## 2018-12-30 PROCEDURE — 99218 HC RM OBSERVATION: CPT

## 2018-12-30 PROCEDURE — 85025 COMPLETE CBC W/AUTO DIFF WBC: CPT

## 2018-12-30 PROCEDURE — 36415 COLL VENOUS BLD VENIPUNCTURE: CPT

## 2018-12-30 PROCEDURE — 65660000000 HC RM CCU STEPDOWN

## 2018-12-30 PROCEDURE — 74011250637 HC RX REV CODE- 250/637

## 2018-12-30 RX ORDER — ATROPINE SULFATE 0.1 MG/ML
0.5 INJECTION INTRAVENOUS
Status: CANCELLED | OUTPATIENT
Start: 2018-12-30 | End: 2018-12-31

## 2018-12-30 RX ORDER — TRAMADOL HYDROCHLORIDE 50 MG/1
50 TABLET ORAL
Status: DISCONTINUED | OUTPATIENT
Start: 2018-12-30 | End: 2019-01-01 | Stop reason: HOSPADM

## 2018-12-30 RX ORDER — SODIUM CHLORIDE 0.9 % (FLUSH) 0.9 %
5-10 SYRINGE (ML) INJECTION AS NEEDED
Status: CANCELLED | OUTPATIENT
Start: 2018-12-30

## 2018-12-30 RX ORDER — SODIUM CHLORIDE 9 MG/ML
1000 INJECTION, SOLUTION INTRAVENOUS CONTINUOUS
Status: CANCELLED | OUTPATIENT
Start: 2018-12-30

## 2018-12-30 RX ORDER — SODIUM CHLORIDE 0.9 % (FLUSH) 0.9 %
5-10 SYRINGE (ML) INJECTION EVERY 8 HOURS
Status: CANCELLED | OUTPATIENT
Start: 2018-12-30

## 2018-12-30 RX ORDER — DEXTROMETHORPHAN/PSEUDOEPHED 2.5-7.5/.8
1.2 DROPS ORAL
Status: CANCELLED | OUTPATIENT
Start: 2018-12-30

## 2018-12-30 RX ORDER — METOCLOPRAMIDE HYDROCHLORIDE 5 MG/ML
5 INJECTION INTRAMUSCULAR; INTRAVENOUS
Status: CANCELLED | OUTPATIENT
Start: 2018-12-30

## 2018-12-30 RX ORDER — EPINEPHRINE 0.1 MG/ML
1 INJECTION INTRACARDIAC; INTRAVENOUS
Status: CANCELLED | OUTPATIENT
Start: 2018-12-30 | End: 2018-12-31

## 2018-12-30 RX ORDER — TRAMADOL HYDROCHLORIDE 50 MG/1
TABLET ORAL
Status: COMPLETED
Start: 2018-12-30 | End: 2018-12-30

## 2018-12-30 RX ORDER — SODIUM CHLORIDE 9 MG/ML
1000 INJECTION, SOLUTION INTRAVENOUS CONTINUOUS
Status: CANCELLED | OUTPATIENT
Start: 2018-12-30 | End: 2018-12-30

## 2018-12-30 RX ORDER — DIPHENHYDRAMINE HYDROCHLORIDE 50 MG/ML
50 INJECTION, SOLUTION INTRAMUSCULAR; INTRAVENOUS ONCE
Status: CANCELLED | OUTPATIENT
Start: 2018-12-30 | End: 2018-12-30

## 2018-12-30 RX ADMIN — TRAMADOL HYDROCHLORIDE 50 MG: 50 TABLET, FILM COATED ORAL at 09:15

## 2018-12-30 RX ADMIN — POLYETHYLENE GLYCOL-3350 AND ELECTROLYTES WITH FLAVOR PACK 4000 ML: 240; 5.84; 2.98; 6.72; 22.72 POWDER, FOR SOLUTION ORAL at 21:43

## 2018-12-30 RX ADMIN — ACETAMINOPHEN 650 MG: 325 TABLET ORAL at 00:46

## 2018-12-30 RX ADMIN — PREDNISONE 20 MG: 20 TABLET ORAL at 12:27

## 2018-12-30 RX ADMIN — SODIUM CHLORIDE 40 MG: 9 INJECTION INTRAMUSCULAR; INTRAVENOUS; SUBCUTANEOUS at 09:14

## 2018-12-30 RX ADMIN — SODIUM CHLORIDE 40 MG: 9 INJECTION INTRAMUSCULAR; INTRAVENOUS; SUBCUTANEOUS at 21:36

## 2018-12-30 RX ADMIN — ALFUZOSIN HYDROCHLORIDE 10 MG: 10 TABLET ORAL at 21:37

## 2018-12-30 RX ADMIN — LEVOTHYROXINE SODIUM 150 MCG: 75 TABLET ORAL at 21:37

## 2018-12-30 RX ADMIN — Medication 400 MG: at 12:27

## 2018-12-30 RX ADMIN — METOPROLOL TARTRATE 50 MG: 50 TABLET ORAL at 21:37

## 2018-12-30 RX ADMIN — TRAMADOL HYDROCHLORIDE 50 MG: 50 TABLET, FILM COATED ORAL at 17:34

## 2018-12-30 RX ADMIN — ACETAMINOPHEN 650 MG: 325 TABLET ORAL at 19:29

## 2018-12-30 RX ADMIN — ACETAMINOPHEN 650 MG: 325 TABLET ORAL at 13:29

## 2018-12-30 RX ADMIN — METOPROLOL TARTRATE 50 MG: 50 TABLET ORAL at 09:14

## 2018-12-30 RX ADMIN — TRAMADOL HYDROCHLORIDE 50 MG: 50 TABLET, FILM COATED ORAL at 01:30

## 2018-12-30 RX ADMIN — HYDROCHLOROTHIAZIDE 25 MG: 25 TABLET ORAL at 09:00

## 2018-12-30 RX ADMIN — ACETAMINOPHEN 650 MG: 325 TABLET ORAL at 07:14

## 2018-12-30 NOTE — PROGRESS NOTES
Problem: Falls - Risk of 
Goal: *Absence of Falls Document Clari Cruz Fall Risk and appropriate interventions in the flowsheet. Outcome: Progressing Towards Goal 
Fall Risk Interventions: 
Mobility Interventions: Strengthening exercises (ROM-active/passive), Communicate number of staff needed for ambulation/transfer Medication Interventions: Evaluate medications/consider consulting pharmacy, Patient to call before getting OOB, Teach patient to arise slowly Elimination Interventions: Call light in reach, Patient to call for help with toileting needs, Toileting schedule/hourly rounds, Urinal in reach

## 2018-12-30 NOTE — ROUTINE PROCESS
Shift summary: 
1305: Received verbal orders from Dr. Mayra Arzate to have patient receive repeat NM GI bleeding scan. Dr. Mayra Arzate stated that the plan if for pt to receive colonoscopy possibly Monday. 1307: Paged on call NM technician waiting for call back.

## 2018-12-30 NOTE — PROGRESS NOTES
Problem: Mobility Impaired (Adult and Pediatric) Goal: *Acute Goals and Plan of Care (Insert Text) Physical Therapy Goals Initiated 12/28/2018 and to be accomplished within 5-7 day(s) 1. Patient will move from supine <> sit with mod I in prep for out of bed activity and change of position. 2.  Patient will perform sit<> stand with S with RW in prep for transfers/ambulation. 3.  Patient will transfer from bed <> chair with S/RW for time up in chair for completion of ADL activity. 4.  Patient will ambulate 100 feet with S/RW for improved functional mobility/safe discharge home. Outcome: Not Progressing Towards Goal 
Pt refused PT due to: 
[]  Nausea/vomiting 
[]  Eating 
[x]  Pain (10/10 Abdominal) []  Pt lethargic 
[]  Off Unit Pt's wife was provided with HEP, to promote LE use. Will f/u tomorrow. Thank you.  
Katheryn Diop, PTA

## 2018-12-30 NOTE — ROUTINE PROCESS
Bedside and Verbal shift change report given to Gilbert Green RN by Haylee Petersen RN. Report included the following information SBAR, Kardex, OR Summary, Intake/Output and MAR.

## 2018-12-30 NOTE — PROGRESS NOTES
[address] care. 2156 - Pt had a bloody stool with red in color blood present. Wife/pt stated that stools began dark and have recently been brighter in color although there has been a slight increase in amount of blood. Will continue to monitor. Pt resting in bed, wife at bedside. Pt denies chest pain/SOB. Dressing to right abdomen changed. No concerns voiced. Bed in lowest position, call bell within reach. Pt encouraged to call for assistance. 9535 - Patient rated pain 5/10, pain medication administered per MAR. No other concerns voiced at this time. Call bell within reach, bed in lowest position. Pt encouraged to use call bell for any needs. [id number] paged at this time regarding pt's request for alternative pain medication. 36 - Spoke with Dr. Ravinder Villasenor at this time. Telephone orders with readback for Tramadol 50mg q6h prn. Notified physician of GI study results and bloody stools. Ordered to monitor pt and H/H. 
 
0129 - Patient rated pain 4/10, pain medication administered per [facility]. No other concerns voiced at this time. Call bell within reach, bed in lowest position. Pt encouraged to use call bell for any needs. 7112 - Patient rated pain 3/10, pain medication administered per MAR. No other concerns voiced at this time. Call bell within reach, bed in lowest position. Pt encouraged to use call bell for any needs.

## 2018-12-30 NOTE — ROUTINE PROCESS
Bedside and Verbal shift change report given to [id number] (oncoming nurse) by Shiloh Bonilla RN (offgoing nurse). Report included the following information SBAR, Kardex, Procedure Summary, Intake/Output, MAR, Accordion, Recent Results and Med Rec Status.

## 2018-12-30 NOTE — PROGRESS NOTES
Problem: Mobility Impaired (Adult and Pediatric) Goal: *Acute Goals and Plan of Care (Insert Text) Physical Therapy Goals Initiated 12/28/2018 and to be accomplished within 5-7 day(s) 1. Patient will move from supine <> sit with mod I in prep for out of bed activity and change of position. 2.  Patient will perform sit<> stand with S with RW in prep for transfers/ambulation. 3.  Patient will transfer from bed <> chair with S/RW for time up in chair for completion of ADL activity. 4.  Patient will ambulate 100 feet with S/RW for improved functional mobility/safe discharge home. Outcome: Not Progressing Towards Goal 
Pt refused PT due to: 
[]  Nausea/vomiting 
[x]  Eating 
[]  Pain 
[]  Pt lethargic 
[]  Off Unit Will f/u later, as pt's schedule allows. Thank you.  
Dg Hoover, PTA

## 2018-12-30 NOTE — PROGRESS NOTES
Pt refusing cpap tonight d/t issues of having frequent diarrhea. Family member present with pt and states it would be too difficult to use the cpap mask with frequent restroom trips. No distress noted.

## 2018-12-30 NOTE — PROGRESS NOTES
Hospitalist Progress Note Patient: Karl Boggs MRN: [id number]  CSN: [id number] YOB: 1980  Age: 34 y.o. Sex: male DOA: 12/26/2018 LOS:  LOS: 4 days Assessment and Plan: 
 
Principal Problem: 
  Acute blood loss anemia (12/26/2018) Active Problems: 
  Psoriatic arthritis ([facility] () 
 
  HAWA (obstructive sleep apnea) (11/15/2018) Morbidly obese ([facility] (11/15/2018) HTN (hypertension) (11/15/2018) Hypothyroidism (11/15/2018) Stage 3 chronic kidney disease ([facility] (11/15/2018) GI bleed (12/26/2018) FSGS (focal segmental glomerulosclerosis) (12/26/2018) 1.  Acute blood loss anemia. Hgb up after 2 units total transfusion- it is drifting down however  and down again today 2.  Upper gastrointestinal bleed. EGD findings as noted by GI. Needs to be on PPi . Will change to oral tomorrow if he stabilizes. Bleeding scan again today. Possible colonoscopy tomorrow 3.  Chronic kidney disease stage III. From FSGS: this is stable  -follow daily BMP-stbale 4.   Psoriatic arthritis. not on enbrel any longer-no changes here 5  Morbid obesity 6. .  Obstructive sleep apnea on CPAP-nightly 7. HTN:  HCTZ he takes every day to help edema 8.wounds right side-wound care has seen 
  
 
 
 
 
Chief complaint:  GI bleed Subjective: He feels weak, still with BRB per rectum Review of systems: 
 
General: No fevers or chills. Cardiovascular: No chest pain or pressure. No palpitations. Pulmonary: No shortness of breath. Gastrointestinal: No nausea, vomiting. Objective: 
 
Vital signs/Intake and Output: 
Visit Vitals BP 94/51 Pulse 78 Temp 98.1 °F (36.7 °C) Resp 18 Ht 5' 9\" (1.753 m) Wt (!) 162.3 kg (357 lb 14.4 oz) SpO2 98% BMI 52.85 kg/m² Current Shift:  12/30 0701 - 12/30 1900 In: 360 [P.O.:360] Out: - Last three shifts:  12/28 1901 - 12/30 0700 In: 240 [P.O.:240] Out: 1100 [Urine:1100] Physical Exam: General: NAD, AAOx3. Non-toxic. HEENT: NC/AT. PERRLA, EOMI.  MMM. Lungs: Nml inspection. CTA B/L. No wheezing, rales or rhonchi. Heart:  S1S2 RRR,  PMI mid 5th IC space. No M/RG. Abdomen: Soft, NT/ND.  BS+. No peritoneal signs. Extremities: No C/C/E. Psych:   Nml affect. Neurologic:  2-12 intact. Strength 5/5 throughout. Sensation symmetrical. 
 
 
 
 
Labs: Results:  
   
Chemistry Recent Labs 12/30/18 
0434 GLU 86   
K 3.8  CO2 24 BUN 44* CREA 1.99* CA 7.9* AGAP 9  
BUCR 22 CBC w/Diff Recent Labs 12/30/18 
1023 12/30/18 
0434 12/29/18 
1703  12/29/18 
0522 WBC  --  4.7  --   --  4.6  
RBC  --  2.39*  --   --  2.48* HGB 7.0* 7.2* 7.4*   < > 7.6* HCT 21.9* 22.9* 23.1*   < > 23.7*  
PLT  --  221  --   --  242 GRANS  --  79*  --   --  76* LYMPH  --  11*  --   --  10* EOS  --  0  --   --  1  
 < > = values in this interval not displayed. Cardiac Enzymes No results for input(s): CPK, CKND1, NIKA in the last 72 hours. No lab exists for component: Brenita [place] Coagulation No results for input(s): PTP, INR, APTT in the last 72 hours. No lab exists for component: INREXT Lipid Panel No results found for: CHOL, CHOLPOCT, CHOLX, CHLST, CHOLV, 133170, HDL, LDL, LDLC, DLDLP, 594085, VLDLC, VLDL, TGLX, TRIGL, TRIGP, TGLPOCT, CHHD, CHHDX  
BNP No results for input(s): BNPP in the last 72 hours. Liver Enzymes No results for input(s): TP, ALB, TBIL, AP, SGOT, GPT in the last 72 hours. No lab exists for component: DBIL Thyroid Studies Lab Results Component Value Date/Time TSH 0.09 (L) 12/26/2018 08:30 PM  
    
Procedures/imaging: see electronic medical records for all procedures/Xrays and details which were not copied into this note but were reviewed prior to creation of Plan

## 2018-12-31 LAB
ANION GAP SERPL CALC-SCNC: 8 MMOL/L (ref 3–18)
BASOPHILS # BLD: 0 K/UL (ref 0–0.1)
BASOPHILS NFR BLD: 0 % (ref 0–3)
BUN SERPL-MCNC: 37 MG/DL (ref 7–18)
BUN/CREAT SERPL: 17
CALCIUM SERPL-MCNC: 7.9 MG/DL (ref 8.5–10.1)
CHLORIDE SERPL-SCNC: 107 MMOL/L (ref 100–108)
CO2 SERPL-SCNC: 26 MMOL/L (ref 21–32)
CREAT SERPL-MCNC: 2.12 MG/DL (ref 0.6–1.3)
DIFFERENTIAL METHOD BLD: ABNORMAL
EOSINOPHIL # BLD: 0 K/UL (ref 0–0.4)
EOSINOPHIL NFR BLD: 0 % (ref 0–5)
ERYTHROCYTE [DISTWIDTH] IN BLOOD BY AUTOMATED COUNT: 15.1 % (ref 11.6–14.5)
GLIADIN PEPTIDE IGA SER-ACNC: 3 UNITS (ref 0–19)
GLIADIN PEPTIDE IGG SER-ACNC: 3 UNITS (ref 0–19)
GLUCOSE SERPL-MCNC: 87 MG/DL (ref 74–99)
HCT VFR BLD AUTO: 23.7 % (ref 36–48)
HCT VFR BLD AUTO: 24.3 % (ref 36–48)
HCT VFR BLD AUTO: 25.5 % (ref 36–48)
HGB BLD-MCNC: 7.5 G/DL (ref 13–16)
HGB BLD-MCNC: 7.7 G/DL (ref 13–16)
HGB BLD-MCNC: 8 G/DL (ref 13–16)
IGA SERPL-MCNC: 95 MG/DL (ref 90–386)
LYMPHOCYTES # BLD: 0.7 K/UL (ref 0.8–3.5)
LYMPHOCYTES NFR BLD: 13 % (ref 20–51)
MCH RBC QN AUTO: 30.2 PG (ref 24–34)
MCHC RBC AUTO-ENTMCNC: 31.4 G/DL (ref 31–37)
MCV RBC AUTO: 96.2 FL (ref 74–97)
METAMYELOCYTES NFR BLD MANUAL: 1 %
MONOCYTES # BLD: 0.5 K/UL (ref 0–1)
MONOCYTES NFR BLD: 10 % (ref 2–9)
NEUTS BAND NFR BLD MANUAL: 9 % (ref 0–5)
NEUTS SEG # BLD: 3.6 K/UL (ref 1.8–8)
NEUTS SEG NFR BLD: 67 % (ref 42–75)
PLATELET # BLD AUTO: 360 K/UL (ref 135–420)
PMV BLD AUTO: 8.8 FL (ref 9.2–11.8)
POTASSIUM SERPL-SCNC: 3.9 MMOL/L (ref 3.5–5.5)
RBC # BLD AUTO: 2.65 M/UL (ref 4.7–5.5)
RBC MORPH BLD: ABNORMAL
SODIUM SERPL-SCNC: 141 MMOL/L (ref 136–145)
TTG IGA SER-ACNC: <2 U/ML (ref 0–3)
TTG IGG SER-ACNC: <2 U/ML (ref 0–5)
WBC # BLD AUTO: 5.4 K/UL (ref 4.6–13.2)

## 2018-12-31 PROCEDURE — 99153 MOD SED SAME PHYS/QHP EA: CPT | Performed by: INTERNAL MEDICINE

## 2018-12-31 PROCEDURE — 80048 BASIC METABOLIC PNL TOTAL CA: CPT

## 2018-12-31 PROCEDURE — 77030020256 HC SOL INJ NACL 0.9%  500ML: Performed by: INTERNAL MEDICINE

## 2018-12-31 PROCEDURE — 74011636637 HC RX REV CODE- 636/637: Performed by: HOSPITALIST

## 2018-12-31 PROCEDURE — G0500 MOD SEDAT ENDO SERVICE >5YRS: HCPCS | Performed by: INTERNAL MEDICINE

## 2018-12-31 PROCEDURE — 36415 COLL VENOUS BLD VENIPUNCTURE: CPT

## 2018-12-31 PROCEDURE — 85025 COMPLETE CBC W/AUTO DIFF WBC: CPT

## 2018-12-31 PROCEDURE — 74011250637 HC RX REV CODE- 250/637: Performed by: INTERNAL MEDICINE

## 2018-12-31 PROCEDURE — 77030022875 HC PRB AR PLSM COAG ERBE -C: Performed by: INTERNAL MEDICINE

## 2018-12-31 PROCEDURE — 77030009426 HC FCPS BIOP ENDOSC BSC -B: Performed by: INTERNAL MEDICINE

## 2018-12-31 PROCEDURE — 65660000000 HC RM CCU STEPDOWN

## 2018-12-31 PROCEDURE — 74011250636 HC RX REV CODE- 250/636: Performed by: HOSPITALIST

## 2018-12-31 PROCEDURE — C9113 INJ PANTOPRAZOLE SODIUM, VIA: HCPCS | Performed by: HOSPITALIST

## 2018-12-31 PROCEDURE — 76040000008: Performed by: INTERNAL MEDICINE

## 2018-12-31 PROCEDURE — 85018 HEMOGLOBIN: CPT

## 2018-12-31 PROCEDURE — 88305 TISSUE EXAM BY PATHOLOGIST: CPT

## 2018-12-31 PROCEDURE — 99218 HC RM OBSERVATION: CPT

## 2018-12-31 PROCEDURE — 74011250637 HC RX REV CODE- 250/637: Performed by: HOSPITALIST

## 2018-12-31 PROCEDURE — 77030032490 HC SLV COMPR SCD KNE COVD -B: Performed by: INTERNAL MEDICINE

## 2018-12-31 PROCEDURE — 74011250636 HC RX REV CODE- 250/636

## 2018-12-31 PROCEDURE — 77030037875 HC DRSG MEPILEX <16IN BORD MOLN -A

## 2018-12-31 PROCEDURE — 77030011256 HC DRSG MEPILEX <16IN NO BORD MOLN -A

## 2018-12-31 PROCEDURE — 77030018846 HC SOL IRR STRL H20 ICUM -A

## 2018-12-31 RX ORDER — MIDAZOLAM HYDROCHLORIDE 1 MG/ML
.25-5 INJECTION, SOLUTION INTRAMUSCULAR; INTRAVENOUS
Status: DISCONTINUED | OUTPATIENT
Start: 2018-12-31 | End: 2018-12-31 | Stop reason: HOSPADM

## 2018-12-31 RX ORDER — NALOXONE HYDROCHLORIDE 0.4 MG/ML
0.4 INJECTION, SOLUTION INTRAMUSCULAR; INTRAVENOUS; SUBCUTANEOUS
Status: DISCONTINUED | OUTPATIENT
Start: 2018-12-31 | End: 2018-12-31 | Stop reason: HOSPADM

## 2018-12-31 RX ORDER — FLUMAZENIL 0.1 MG/ML
0.2 INJECTION INTRAVENOUS
Status: DISCONTINUED | OUTPATIENT
Start: 2018-12-31 | End: 2018-12-31 | Stop reason: HOSPADM

## 2018-12-31 RX ORDER — SODIUM CHLORIDE 9 MG/ML
1000 INJECTION, SOLUTION INTRAVENOUS CONTINUOUS
Status: DISCONTINUED | OUTPATIENT
Start: 2018-12-31 | End: 2018-12-31 | Stop reason: HOSPADM

## 2018-12-31 RX ORDER — FENTANYL CITRATE 50 UG/ML
100 INJECTION, SOLUTION INTRAMUSCULAR; INTRAVENOUS
Status: DISCONTINUED | OUTPATIENT
Start: 2018-12-31 | End: 2018-12-31 | Stop reason: HOSPADM

## 2018-12-31 RX ORDER — MIDAZOLAM HYDROCHLORIDE 1 MG/ML
.25-5 INJECTION, SOLUTION INTRAMUSCULAR; INTRAVENOUS
Status: DISCONTINUED | OUTPATIENT
Start: 2018-12-31 | End: 2018-12-31 | Stop reason: SDUPTHER

## 2018-12-31 RX ORDER — MORPHINE SULFATE 2 MG/ML
2 INJECTION, SOLUTION INTRAMUSCULAR; INTRAVENOUS
Status: DISCONTINUED | OUTPATIENT
Start: 2018-12-31 | End: 2019-01-01 | Stop reason: HOSPADM

## 2018-12-31 RX ADMIN — METOPROLOL TARTRATE 50 MG: 50 TABLET ORAL at 10:16

## 2018-12-31 RX ADMIN — Medication 400 MG: at 10:19

## 2018-12-31 RX ADMIN — ALFUZOSIN HYDROCHLORIDE 10 MG: 10 TABLET ORAL at 20:47

## 2018-12-31 RX ADMIN — MORPHINE SULFATE 2 MG: 2 INJECTION, SOLUTION INTRAMUSCULAR; INTRAVENOUS at 18:33

## 2018-12-31 RX ADMIN — TRAMADOL HYDROCHLORIDE 50 MG: 50 TABLET, FILM COATED ORAL at 10:15

## 2018-12-31 RX ADMIN — SODIUM CHLORIDE 40 MG: 9 INJECTION INTRAMUSCULAR; INTRAVENOUS; SUBCUTANEOUS at 10:16

## 2018-12-31 RX ADMIN — TRAMADOL HYDROCHLORIDE 50 MG: 50 TABLET, FILM COATED ORAL at 02:00

## 2018-12-31 RX ADMIN — ACETAMINOPHEN 650 MG: 325 TABLET ORAL at 20:55

## 2018-12-31 RX ADMIN — MORPHINE SULFATE 2 MG: 2 INJECTION, SOLUTION INTRAMUSCULAR; INTRAVENOUS at 10:29

## 2018-12-31 RX ADMIN — METOPROLOL TARTRATE 50 MG: 50 TABLET ORAL at 20:47

## 2018-12-31 RX ADMIN — HYDROCHLOROTHIAZIDE 25 MG: 25 TABLET ORAL at 10:15

## 2018-12-31 RX ADMIN — PREDNISONE 20 MG: 20 TABLET ORAL at 10:29

## 2018-12-31 RX ADMIN — ONDANSETRON 4 MG: 4 TABLET, ORALLY DISINTEGRATING ORAL at 02:00

## 2018-12-31 RX ADMIN — SODIUM CHLORIDE 40 MG: 9 INJECTION INTRAMUSCULAR; INTRAVENOUS; SUBCUTANEOUS at 20:47

## 2018-12-31 RX ADMIN — MORPHINE SULFATE 2 MG: 2 INJECTION, SOLUTION INTRAMUSCULAR; INTRAVENOUS at 14:45

## 2018-12-31 RX ADMIN — ACETAMINOPHEN 650 MG: 325 TABLET ORAL at 10:15

## 2018-12-31 RX ADMIN — LEVOTHYROXINE SODIUM 150 MCG: 75 TABLET ORAL at 20:47

## 2018-12-31 NOTE — PROGRESS NOTES
Rectal bleeding with every bm according to the nurse red blood and small quantity. So itis going to be either hemorrhoids or with the notion of diarrhea it may be ulcerative colitis? Would do tomorrow Colonoscopy or flexible sigmoidoscopy. He has very low threshold for pain. Hgb remained stable at 7.2. Vital signs are stable. BUN 44 and Creatinine 1.99 which are better He is c/o lower abdominal pain but CT scan of the abdomen and pelvis is normal 
Iatrogenic Cushing syndrome CKD from nephrotic syndrome and nephrosclerosis probably was aggravated by the intake of NSAID's 
Gastric prepyloric ulcer is caused by the intake of Aleve with GI bleeding. Presently no bleeding Morbid obesity Psoriasic arthritis on steroids x 3/ 2018

## 2018-12-31 NOTE — ROUTINE PROCESS
Bedside and Verbal shift change report given to Alcario Dakins, RN (oncoming nurse) by Allison Abdullahi RN (offgoing nurse). Report included the following information SBAR, Kardex, Procedure Summary, Intake/Output, MAR, Accordion and Recent Results.

## 2018-12-31 NOTE — PROGRESS NOTES
Chart reviewed per previous cm notes when discharged pt plans to use home health services thru [facility] services cm will cont to review and remain available .

## 2018-12-31 NOTE — PROGRESS NOTES
Pt stated on Colyte in anticipation for AM colonoscopy. Pt educated on med and its purpose, he appears hesitant regarding med d/t toileting, spouse at bedside assisting RN by encouraging pt to take med. CHG bath administered 0025: Pt had two episodes of BM which appears yellow with small amount of blood in it.  
 
0325: Pt had an episode of watery bloody stool. He c/o dizziness, and SOB, not willing to have his CPAP d/t constant toileting; VS obtained, stable with MEW score at one.  
 
0530: Dressing change done to abdominal folds, stool appears yellow and watery; pt half done with bowel; prep. He is encourage to complete medication for scheduled test procedure. Secondary CHG bath done. Hemoglobin with slight improvement. Will continue to monitor. Results for Carmelina De Dios (MRN [id number]) as of 12/31/2018 06:19 Ref. Range 12/30/2018 10:23 12/30/2018 17:23 12/31/2018 04:30 HGB Latest Ref Range: 13.0 - 16.0 g/dL 7.0 (L) 7.3 (L) 8.0 (L) Bedside shift change report given to Jamal Herrmann RN (oncoming nurse) by Rebekah Correa RN (offgoing nurse). Report included the following information SBAR, Procedure Summary, Intake/Output, MAR, Recent Results and Cardiac Rhythm SR, ST.

## 2018-12-31 NOTE — PROGRESS NOTES
Problem: Falls - Risk of 
Goal: *Absence of Falls Document Candida Lightning Fall Risk and appropriate interventions in the flowsheet. Outcome: Progressing Towards Goal 
Fall Risk Interventions: 
Mobility Interventions: Communicate number of staff needed for ambulation/transfer, Patient to call before getting OOB Medication Interventions: Patient to call before getting OOB Elimination Interventions: Call light in reach, Patient to call for help with toileting needs, Toilet paper/wipes in reach, Urinal in reach History of Falls Interventions: Door open when patient unattended Problem: Pressure Injury - Risk of 
Goal: *Prevention of pressure injury Document Jono Scale and appropriate interventions in the flowsheet. Outcome: Not Progressing Towards Goal 
Pressure Injury Interventions: 
Sensory Interventions: Assess changes in LOC, Minimize linen layers, Maintain/enhance activity level, Keep linens dry and wrinkle-free Moisture Interventions: Apply protective barrier, creams and emollients, Maintain skin hydration (lotion/cream) Activity Interventions: Pressure redistribution bed/mattress(bed type) Mobility Interventions: HOB 30 degrees or less, Pressure redistribution bed/mattress (bed type) Nutrition Interventions: Document food/fluid/supplement intake, Offer support with meals,snacks and hydration Friction and Shear Interventions: HOB 30 degrees or less

## 2018-12-31 NOTE — PROGRESS NOTES
Hospitalist Progress Note Patient: Delilah Paulson MRN: [id number]  CSN: [id number] YOB: 1980  Age: 34 y.o. Sex: male DOA: 12/26/2018 LOS:  LOS: 5 days Chief Complaint: 
 
 
GI bleed Assessment/Plan GI bleed with gastric ulcer and also poss lower GI bleed source Ac blood loss anemia CKD with FSGS-stable CR Morbid obesity Chronic prednisone use and cushingoid facies HAWA on CPAP Psoriatic arthritis 
abd pain-morphine ordered this am 
 
For colonoscopy today No transfusion indicated for now PPI Continue his prednisone STOP alleve and NSAIDS- counselling has been done Reviewed with pt, wife and nurse at bedside Disposition :home 24 hrs Patient Active Problem List  
Diagnosis Code  Psoriatic arthritis ([facility] [address] L40.50  HAWA (obstructive sleep apnea) G47.33  
 Morbidly obese (HCC) E66.01  
 HTN (hypertension) I10  
 Hypothyroidism E03.9  Stage 3 chronic kidney disease (HCC) N18.3  Acute blood loss anemia D62  
 GI bleed K92.2  FSGS (focal segmental glomerulosclerosis) N05.1 Subjective: 
 
Denies new issue Lower abd pain and back pain from getting up all night with GI prep Review of systems: 
 
Constitutional: denies fevers, chills, myalgias Respiratory: denies SOB, cough Cardiovascular: denies chest pain, palpitations Gastrointestinal: denies nausea, vomiting Vital signs/Intake and Output: 
Visit Vitals /73 (BP 1 Location: Left arm, BP Patient Position: At rest;Supine) Pulse 80 Temp 97.8 °F (36.6 °C) Resp 18 Ht 5' 9\" (1.753 m) Wt (!) 162.3 kg (357 lb 14.4 oz) SpO2 97% BMI 52.85 kg/m² Current Shift:  No intake/output data recorded. Last three shifts:  12/29 1901 - 12/31 0700 In: 360 [P.O.:360] Out: 405 [Urine:405] Exam: 
 
General: obese cushingoid appearing Wm, alert, NAD, OX3 Head/Neck: NCAT, supple, No masses, No lymphadenopathy CVS:Regular rate and rhythm, no M/R/G, S1/S2 heard, no thrill Lungs:Clear to auscultation bilaterally, no wheezes, rhonchi, or rales Abdomen: Soft, Nontender, No distention, Normal Bowel sounds, No hepatomegaly Extremities: No C/C/E, pulses palpable 2+ Skin:large diffuse stretch marks Neuro:grossly normal , follows commands Psych:appropriate Labs: Results:  
   
Chemistry Recent Labs 12/31/18 
0430 12/30/18 
0434 GLU 87 86  140  
K 3.9 3.8  107 CO2 26 24 BUN 37* 44* CREA 2.12* 1.99* CA 7.9* 7.9* AGAP 8 9 BUCR 17 22 CBC w/Diff Recent Labs 12/31/18 
1042 12/31/18 
0430 12/30/18 
1723  12/30/18 
0434  12/29/18 
0522 WBC  --  5.4  --   --  4.7  --  4.6  
RBC  --  2.65*  --   --  2.39*  --  2.48* HGB 7.7* 8.0* 7.3*   < > 7.2*   < > 7.6* HCT 24.3* 25.5* 23.1*   < > 22.9*   < > 23.7*  
PLT  --  360  --   --  221  --  242 GRANS  --  67  --   --  79*  --  76* LYMPH  --  13*  --   --  11*  --  10* EOS  --  0  --   --  0  --  1  
 < > = values in this interval not displayed. Cardiac Enzymes No results for input(s): CPK, CKND1, NIKA in the last 72 hours. No lab exists for component: Alvira Lightning Coagulation No results for input(s): PTP, INR, APTT in the last 72 hours. No lab exists for component: INREXT Lipid Panel No results found for: CHOL, CHOLPOCT, CHOLX, CHLST, CHOLV, 018564, HDL, LDL, LDLC, DLDLP, 916398, VLDLC, VLDL, TGLX, TRIGL, TRIGP, TGLPOCT, CHHD, CHHDX  
BNP No results for input(s): BNPP in the last 72 hours. Liver Enzymes No results for input(s): TP, ALB, TBIL, AP, SGOT, GPT in the last 72 hours. No lab exists for component: DBIL Thyroid Studies Lab Results Component Value Date/Time TSH 0.09 (L) 12/26/2018 08:30 PM  
    
Procedures/imaging: see electronic medical records for all procedures/Xrays and details which were not copied into this note but were reviewed prior to creation of Plan Breanne Vyas MD

## 2018-12-31 NOTE — PROGRESS NOTES
Pt and wife refusing cpap for HS at this time. Pt is prepping for a colonoscopy tomorrow and will be making frequent restroom trips. Pt wife stated they will call if cpap is needed. No distress noted.

## 2018-12-31 NOTE — PROGRESS NOTES
Problem: Mobility Impaired (Adult and Pediatric) Goal: *Acute Goals and Plan of Care (Insert Text) Physical Therapy Goals Initiated 12/28/2018 and to be accomplished within 5-7 day(s) 1. Patient will move from supine <> sit with mod I in prep for out of bed activity and change of position. 2.  Patient will perform sit<> stand with S with RW in prep for transfers/ambulation. 3.  Patient will transfer from bed <> chair with S/RW for time up in chair for completion of ADL activity. 4.  Patient will ambulate 100 feet with S/RW for improved functional mobility/safe discharge home. 12/31/2018  PT treatment not completed due to: 
[] Off Unit for testing/procedure 
[] Pain 
[] Eating 
[x] Patient too lethargic 
[] Nausea/vomiting 
[] Dialysis treatment in progress  
[x] Other: pt lethrgic/sleeping, having received pain meds - Morphine  ~1 hr ago per spouse. Spouse reports pt up all night to [facility] in prep for colonoscopy planned for this am, however, test delayed twice and now scheduled for 3:30. Pt allowed to continue sleeping and will f/u tomorrow for PT treatment as pt schedule allows.    
Earnest Dopp, PT

## 2019-01-01 ENCOUNTER — APPOINTMENT (OUTPATIENT)
Dept: GENERAL RADIOLOGY | Age: 39
DRG: 393 | End: 2019-01-01
Attending: HOSPITALIST
Payer: COMMERCIAL

## 2019-01-01 VITALS
OXYGEN SATURATION: 96 % | RESPIRATION RATE: 18 BRPM | HEIGHT: 69 IN | TEMPERATURE: 97.6 F | DIASTOLIC BLOOD PRESSURE: 58 MMHG | WEIGHT: 315 LBS | SYSTOLIC BLOOD PRESSURE: 114 MMHG | BODY MASS INDEX: 46.65 KG/M2 | HEART RATE: 74 BPM

## 2019-01-01 PROCEDURE — 74011250636 HC RX REV CODE- 250/636: Performed by: HOSPITALIST

## 2019-01-01 PROCEDURE — 72100 X-RAY EXAM L-S SPINE 2/3 VWS: CPT

## 2019-01-01 PROCEDURE — 94660 CPAP INITIATION&MGMT: CPT

## 2019-01-01 PROCEDURE — 99218 HC RM OBSERVATION: CPT

## 2019-01-01 PROCEDURE — 74011250637 HC RX REV CODE- 250/637: Performed by: HOSPITALIST

## 2019-01-01 PROCEDURE — C9113 INJ PANTOPRAZOLE SODIUM, VIA: HCPCS | Performed by: HOSPITALIST

## 2019-01-01 RX ORDER — PANTOPRAZOLE SODIUM 40 MG/1
40 TABLET, DELAYED RELEASE ORAL
Qty: 60 TAB | Refills: 0 | Status: SHIPPED | OUTPATIENT
Start: 2019-01-01

## 2019-01-01 RX ORDER — KETOROLAC TROMETHAMINE 15 MG/ML
15 INJECTION, SOLUTION INTRAMUSCULAR; INTRAVENOUS
Status: DISCONTINUED | OUTPATIENT
Start: 2019-01-01 | End: 2019-01-01 | Stop reason: HOSPADM

## 2019-01-01 RX ADMIN — MORPHINE SULFATE 2 MG: 2 INJECTION, SOLUTION INTRAMUSCULAR; INTRAVENOUS at 01:19

## 2019-01-01 RX ADMIN — ACETAMINOPHEN 650 MG: 325 TABLET ORAL at 05:54

## 2019-01-01 RX ADMIN — Medication 400 MG: at 09:51

## 2019-01-01 RX ADMIN — SODIUM CHLORIDE 40 MG: 9 INJECTION INTRAMUSCULAR; INTRAVENOUS; SUBCUTANEOUS at 09:50

## 2019-01-01 RX ADMIN — METOPROLOL TARTRATE 50 MG: 50 TABLET ORAL at 09:51

## 2019-01-01 RX ADMIN — KETOROLAC TROMETHAMINE 15 MG: 15 INJECTION, SOLUTION INTRAMUSCULAR; INTRAVENOUS at 08:40

## 2019-01-01 RX ADMIN — HYDROCHLOROTHIAZIDE 25 MG: 25 TABLET ORAL at 09:51

## 2019-01-01 NOTE — PROGRESS NOTES
1900: Assumed care from Curt Coto RN. Patient off the floor for Colonoscopy. 2030: Patient back to tele from colonoscopy with wife at bedside. Patient is alert and oriented. Vital signs are WDL. Dr Mikayla Bangura wants to resume regular diet. Frozen meal provided to patient. 0700: Patient had new onset of lower back pain overnight which was treated with PRN pain meds. Dr Michael Oreilly is aware. Bedside and Verbal shift change report given to Clara Cedillo RN (oncoming nurse) by Alejandra Pak RN (offgoing nurse). Report included the following information SBAR, Kardex and MAR.

## 2019-01-01 NOTE — DISCHARGE INSTRUCTIONS
DISCHARGE SUMMARY from Nurse    PATIENT INSTRUCTIONS:      What to do at Home:  Recommended activity: Activity as tolerated,     *  Please give a list of your current medications to your Primary Care Provider. *  Please update this list whenever your medications are discontinued, doses are      changed, or new medications (including over-the-counter products) are added. *  Please carry medication information at all times in case of emergency situations. These are general instructions for a healthy lifestyle:    No smoking/ No tobacco products/ Avoid exposure to second hand smoke  Surgeon General's Warning:  Quitting smoking now greatly reduces serious risk to your health. Obesity, smoking, and sedentary lifestyle greatly increases your risk for illness    A healthy diet, regular physical exercise & weight monitoring are important for maintaining a healthy lifestyle    You may be retaining fluid if you have a history of heart failure or if you experience any of the following symptoms:  Weight gain of 3 pounds or more overnight or 5 pounds in a week, increased swelling in our hands or feet or shortness of breath while lying flat in bed. Please call your doctor as soon as you notice any of these symptoms; do not wait until your next office visit. Recognize signs and symptoms of STROKE:    F-face looks uneven    A-arms unable to move or move unevenly    S-speech slurred or non-existent    T-time-call 911 as soon as signs and symptoms begin-DO NOT go       Back to bed or wait to see if you get better-TIME IS BRAIN. Warning Signs of HEART ATTACK     Call 911 if you have these symptoms:   Chest discomfort. Most heart attacks involve discomfort in the center of the chest that lasts more than a few minutes, or that goes away and comes back. It can feel like uncomfortable pressure, squeezing, fullness, or pain.  Discomfort in other areas of the upper body.  Symptoms can include pain or discomfort in one or both arms, the back, neck, jaw, or stomach.  Shortness of breath with or without chest discomfort.  Other signs may include breaking out in a cold sweat, nausea, or lightheadedness. Don't wait more than five minutes to call 911 - MINUTES MATTER! Fast action can save your life. Calling 911 is almost always the fastest way to get lifesaving treatment. Emergency Medical Services staff can begin treatment when they arrive -- up to an hour sooner than if someone gets to the hospital by car. The discharge information has been reviewed with the patient. The patient verbalized understanding. Discharge medications reviewed with the patient and appropriate educational materials and side effects teaching were provided.   ___________________________________________________________________________________________________________________________________

## 2019-01-01 NOTE — PROGRESS NOTES
Pt using hospital-issued resmed cpap in auto-titrating cpap mode at 21% fio2. Pt and wife are comfortable with operation of equipment since pt uses a cpap at home, and they are placing the mask off and on as pt desires for comfort and while sleeping. No distress noted.

## 2019-01-01 NOTE — PROGRESS NOTES
Problem: Falls - Risk of 
Goal: *Absence of Falls Document Ayden Fells Fall Risk and appropriate interventions in the flowsheet. Outcome: Progressing Towards Goal 
Fall Risk Interventions: 
Mobility Interventions: Assess mobility with egress test, Bed/chair exit alarm, Patient to call before getting OOB Medication Interventions: Teach patient to arise slowly, Patient to call before getting OOB, Bed/chair exit alarm Elimination Interventions: Patient to call for help with toileting needs, Toilet paper/wipes in reach, Call light in reach, Toileting schedule/hourly rounds History of Falls Interventions: Bed/chair exit alarm, Door open when patient unattended, Investigate reason for fall, Room close to nurse's station Problem: Pressure Injury - Risk of 
Goal: *Prevention of pressure injury Document Jono Scale and appropriate interventions in the flowsheet. Outcome: Progressing Towards Goal 
Pressure Injury Interventions: 
Sensory Interventions: Float heels, Maintain/enhance activity level, Minimize linen layers, Keep linens dry and wrinkle-free, Pressure redistribution bed/mattress (bed type) Moisture Interventions: Absorbent underpads, Check for incontinence Q2 hours and as needed Activity Interventions: Pressure redistribution bed/mattress(bed type), Increase time out of bed Mobility Interventions: Assess need for specialty bed, HOB 30 degrees or less, Pressure redistribution bed/mattress (bed type), PT/OT evaluation Nutrition Interventions: Document food/fluid/supplement intake, Offer support with meals,snacks and hydration Friction and Shear Interventions: Apply protective barrier, creams and emollients, HOB 30 degrees or less, Minimize layers

## 2019-01-01 NOTE — PROCEDURES
[facility] 
Colonoscopy Procedure Report 
_______________________________________________________ Patient: Reza Daniel                                         Attending Physician: Ashia Camacho MD 
 
Patient ID: [id number]                                      Referring Physician: Ahmet Gonzalez MD 
 
Exam Date: December 31, 2018 _______________________________________________________ Introduction: A  34 y.o. male patient, presents for inpatient Colonoscopy Indications: Psoriasic arthritis with CKD stage 3 presented initially with melena 2 weeks ago that was attributed to prepyloric NSAID's induced ulcer but later he was passing repeatedly dark red blood per rectum in the last few days at the hospital only. He claimed that he had chronic problem with mild chronic diarrhea all his life. He has been c/o severe RLQ abdominal and epigastric pain increased after defecation. GI bleeding scan and CT of the abdomen and pelvis were negative. Hgb dropped gradually to 7.1. He received on Dec 27, 2018 two blood transfusions. Consent: The benefits, risks, and alternatives to the procedure were discussed and informed consent was obtained from the patient. Preparation: EKG, pulse, pulse oximetry and blood pressure were monitored throughout the procedure. ASA Classification: Class 3 - . The heart is an S1-S2 and regular heart rate and rhythm. Lungs are clear to auscultation and percussion. Abdomen is soft, nondistended, and nontender. Mental Status: awake, alert, and oriented to person, place, and time Medications: 
· Fentanyl 100 mcg IV before procedure. · Versed 7 mg IV throughout the procedure. Rectal Exam: medium sized engorged thrombosed external hemorrhoid with a small skin break oozing tiny amount of blood. No Blood. Prostate not enlarged Pathology Specimens: One specimen removed. Procedure:  
The colonoscope was passed with ease through the anus under direct visualization and advanced to the cecum and 5 cm inside the terminal ileum. The patient required positioning on the back to aid in the passage of the scope. The scope was withdrawn and the mucosa was carefully examined. The quality of the preparation was excellent. The views were excellent. The patient's toleration of the procedure was very good. Retroflexion was preformed in the  ascending colon and hepatic flexure. The exam was done twice to the cecum. Total time is 34 minutes and withdrawal time is 27 minutes. Findings: 
 
Rectum:  
Small internal hemorrhoids Sigmoid:  
Slightly tortuous with mild sigmoid diverticulosis. 4 mm sessile polyp at 30 cm, removed in total with cold forceps Descending Colon:  
Normal 
Transverse Colon:  
Normal 
Ascending Colon: On the upper lip of the ileocecal valve there is a wide spread but superficial ulcer with some recent flat bleeding stigmata. Measuring 3 cm in width by 2 cm. 5 biopsies taken but bleeds 1+. Bleeding had to be controlled with APC. Cecum:  
Normal 
Terminal Ileum:  
Normal over 5 cm no signs of erosions or ulcers inside Unplanned Events: There were no unplanned events. Estimated Blood Loss: 3 cc Impressions:   
medium sized engorged thrombosed external hemorrhoid with a small skin break oozing tiny amount of blood. Small internal hemorrhoids. Slightly tortuous with very mild sigmoid diverticulosis. 4 mm sessile polyp at 30 cm, removed in total with cold forceps. On the upper lip of the ileocecal valve there is a wide spread flat and superficial ulcer with some recent flat bleeding stigmata. Measuring <3 cm in width by 2 cm. 5 biopsies taken but bleeds 1+. Bleeding had to be controlled with APC. Normal Mucosa no sign of ulcerative colitis but patient has been on steroids since March 2018. The cause of that ulcer could be ischemic colitis? ? In the context of severe abdominal pain. NSAID's induced??? Infectious? Or less likely Crohn's disease? Complications: None; patient tolerated the procedure well. Recommendations: · Discharge home when standard parameters are met. · Resume a regular diet. · Colonoscopy recommendation in 10 years. · For the thrombosed hemorrhoids do sitz bath · Can no longer take any NSAID's. · FU with Dr Grey Jackson and if needed with me at anytime Procedure Codes:   
· COLONOSCOPY,FLEX,W/CONTROL, BLEEDING [[id number]] · COLONOSCOPY,BIOPSY [[id number]] Endoscope Information: Model Number(s) [id number] Assistant: None Signed By: Marcelino Francisco MD Date: December 31, 2018

## 2019-01-01 NOTE — PERIOP NOTES
Report called to [facility] RN, Fentanyl 100 mcg and Versed 7 mg given during the procedure; VSS; patient awake and oriented; O2 sats 98% on RA. Patient transported back to his room via bed

## 2019-01-01 NOTE — PROGRESS NOTES
Bedside shift change report given to Viviana Grace [id number] (oncoming nurse) by Arabella Anderson RN (offgoing nurse). Report included the following information SBAR, Kardex, ED Summary, Intake/Output, MAR, Accordion and Alarm Parameters . 4656 Shift assessment complete. Pt given 9 out of 10 pain in lower back. Dr. Jonatan Prince prescribed prn toradol. 1145 Pt states he needs a new walker. Case management states that he just needs an order in order to get it. Paged Dr. Jonatan Prince.  
 
Shift Summary: Shift uneventful. No complaints of chest pain or shortness of breath. Call light is within reach.

## 2019-01-01 NOTE — PROGRESS NOTES
Problem: Mobility Impaired (Adult and Pediatric) Goal: *Acute Goals and Plan of Care (Insert Text) Physical Therapy Goals Initiated 12/28/2018 and to be accomplished within 5-7 day(s) 1. Patient will move from supine <> sit with mod I in prep for out of bed activity and change of position. 2.  Patient will perform sit<> stand with S with RW in prep for transfers/ambulation. 3.  Patient will transfer from bed <> chair with S/RW for time up in chair for completion of ADL activity. 4.  Patient will ambulate 100 feet with S/RW for improved functional mobility/safe discharge home. Outcome: Not Progressing Towards Goal 
Pt refused PT due to: 
[]  Nausea/vomiting 
[]  Eating 
[x]  Pain (LBP 9/10) []  Pt lethargic 
[]  Off Unit Pt refuses to work with PT, until pain is addressed. Will f/u later, as pt's schedule allows. Thank you.  
Fanta Puga, PTA

## 2019-01-01 NOTE — PROGRESS NOTES
Dual AVS reviewed with Linda Soria. All medications reviewed individually with patient. Opportunities for questions and concerns provided. Patient discharged via (mode of transport ie. Car, ambulance or air transport) medical transport. Patient's arm band appropriately discarded.

## 2019-01-01 NOTE — DISCHARGE SUMMARY
Discharge Summary Patient: Rochelle Toribio MRN: [id number]  CSN: [id number] YOB: 1980  Age: 34 y.o. Sex: male DOA: 12/26/2018 LOS:  LOS: 6 days   Discharge Date:   
 
Primary Care Provider:  Lili Guzman MD 
 
Admission Diagnoses: Acute blood loss anemia Discharge Diagnoses:   
Hospital Problems  Date Reviewed: 12/29/2018 Codes Class Noted POA * (Principal) Acute blood loss anemia ICD-10-CM: D62 
ICD-9-CM: 285.1  12/26/2018 Yes GI bleed ICD-10-CM: K92.2 ICD-9-CM: 578.9  12/26/2018 Yes FSGS (focal segmental glomerulosclerosis) ICD-10-CM: N05.1 ICD-9-CM: 582.1  12/26/2018 Yes HAWA (obstructive sleep apnea) ICD-10-CM: G47.33 
ICD-9-CM: 327.23  11/15/2018 Yes Morbidly obese ([facility] [address] ICD-10-CM: E66.01 
ICD-9-CM: 278.01  11/15/2018 Yes HTN (hypertension) ICD-10-CM: I10 
ICD-9-CM: 401.9  11/15/2018 Yes Hypothyroidism ICD-10-CM: E03.9 ICD-9-CM: 244.9  11/15/2018 Yes Stage 3 chronic kidney disease (HCC) ICD-10-CM: N18.3 ICD-9-CM: 585.3  11/15/2018 Yes Psoriatic arthritis ([facility] [address] ICD-10-CM: L40.50 ICD-9-CM: 696.0  Unknown Yes Discharge Condition: Stable Discharge Medications:    
Current Discharge Medication List  
  
START taking these medications Details  
pantoprazole (PROTONIX) 40 mg tablet Take 1 Tab by mouth Before breakfast and dinner. Qty: 60 Tab, Refills: 0 CONTINUE these medications which have NOT CHANGED Details  
albuterol (PROAIR HFA) 90 mcg/actuation inhaler Take 2 Puffs by inhalation every six (6) hours as needed for Wheezing. hydroCHLOROthiazide (HYDRODIURIL) 25 mg tablet Take 25 mg by mouth daily. metoprolol succinate (TOPROL XL) 100 mg tablet Take 50 mg by mouth two (2) times a day. levothyroxine (SYNTHROID) 150 mcg tablet Take 150 mcg by mouth nightly. co-enzyme Q-10 (CO Q-10) 100 mg capsule Take 200 mg by mouth daily (with dinner). b complex vitamins (B COMPLEX 1) tablet Take 1 Tab by mouth daily. predniSONE (DELTASONE) 20 mg tablet Take 20 mg by mouth daily. alfuzosin SR (UROXATRAL) 10 mg SR tablet Take 10 mg by mouth nightly.  
  
magnesium oxide (MAG-OX) 400 mg tablet Take 400 mg by mouth daily (with lunch). torsemide (DEMADEX) 100 mg tablet Take 100 mg by mouth daily. latanoprost (XALATAN) 0.005 % ophthalmic solution Administer 1 Drop to both eyes nightly. Refills: 5  
  
mycophenolate (CELLCEPT) 500 mg tablet Take 1,000 mg by mouth every twelve (12) hours every twelve (12) hours. Takes at 3am and 3pm  
  
loperamide (IMMODIUM) 2 mg tablet Take 4 mg by mouth every twelve (12) hours every twelve (12) hours. 3am and 3pm with cellcept dose STOP taking these medications  
  
 metoprolol tartrate (LOPRESSOR) 100 mg IR tablet Comments:  
Reason for Stopping:   
   
  
 
 
Procedures : upper and lower EGD Consults: Gastroenterology PHYSICAL EXAM  
Visit Vitals /78 (BP 1 Location: Left arm, BP Patient Position: At rest;Supine) Pulse 87 Temp 98.8 °F (37.1 °C) Resp 18 Ht 5' 9\" (1.753 m) Wt (!) 162.2 kg (357 lb 10.1 oz) SpO2 100% BMI 52.81 kg/m² General: Awake, cooperative, no acute distress   
HEENT: NC, Atraumatic. PERRLA, EOMI. Anicteric sclerae. Lungs:  CTA Bilaterally. No Wheezing/Rhonchi/Rales. Heart:  Regular  rhythm,  No murmur, No Rubs, No Gallops Abdomen: Soft, Non distended, Non tender. +Bowel sounds, Extremities: No c/c/e Psych:   Not anxious or agitated. Neurologic:  No acute neurological deficits. Admission HPI :  
This is a 24-year-old gentleman with a multitude of medical issues including advanced kidney disease secondary to FSG as well as  having psoriatic arthritis.   He came into the emergency room complaining of generalized weakness, chills, feeling \"awful,\"  not eating or drinking very much over the last few days. On 12/12 he had passed a bloody stool per his wife's report. She took a picture of it. He had no further blood per rectum since then, but noticed the last couple of days that his stool had turned black. He recently completed a course of amoxicillin for a left leg cellulitis on 12/24. He has been taking Aleve against the advice of his nephrologist for the past few days for stomach pain as well intermittently taking Tylenol and Tums, also. He is followed by Dr. Zainab Dickson and Dr. Patsy Alvarez, nephrology [facility] He has never had any GI bleeding or ulcerations that he is aware of. His hemoglobin is down to 7. His last baseline here that we checked was 8.9 in NOvember when he was hospitalized for orthostasis. Hospital Course :  
Since he was admitted, iv ppi was started for gi bleeding and gi was on board. Upper egd done : no active bleeding, some finds of lloyd;s metaplasia and erosive gastritis. Biopsy was done and recommend double dose PPI/lifestyle modification and repeated upper  egd in 5 years. Colonoscopy was done with  external hemorrhoid findings. Before discharge, he also concerned about  back pain due to \"getting up so many times for BM\" and asked for x-ray. X-ray no acute issue ,then he asked for \"ultram or norcor\" for one week supplies. Explained to patient and wife about \"no indication to treat his back pain at this time\" and give recommendation heating pad, po steroid tyleonol/pt/ot other option. They indicated a verbal understanding. Activity: Activity as tolerated Diet: Regular Diet Follow-up: pcm and gi Disposition: home Minutes spent on discharge: 45 min Labs: Results:  
   
Chemistry Recent Labs 12/31/18 
0430 12/30/18 
0434 GLU 87 86  140  
K 3.9 3.8  107 CO2 26 24 BUN 37* 44* CREA 2.12* 1.99* CA 7.9* 7.9* AGAP 8 9 BUCR 17 22 CBC w/Diff Recent Labs 12/31/18 21  12/31/18 
1042 12/31/18 
0430  12/30/18 
7220 WBC  --   --  5.4  --  4.7  
RBC  --   --  2.65*  --  2.39* HGB 7.5* 7.7* 8.0*   < > 7.2* HCT 23.7* 24.3* 25.5*   < > 22.9*  
PLT  --   --  360  --  221 GRANS  --   --  67  --  79* LYMPH  --   --  13*  --  11* EOS  --   --  0  --  0  
 < > = values in this interval not displayed. Cardiac Enzymes No results for input(s): CPK, CKND1, NIKA in the last 72 hours. No lab exists for component: Lul Zurdo Coagulation No results for input(s): PTP, INR, APTT in the last 72 hours. No lab exists for component: INREXT Lipid Panel No results found for: CHOL, CHOLPOCT, CHOLX, CHLST, CHOLV, 342806, HDL, LDL, LDLC, DLDLP, 678359, VLDLC, VLDL, TGLX, TRIGL, TRIGP, TGLPOCT, CHHD, CHHDX  
BNP No results for input(s): BNPP in the last 72 hours. Liver Enzymes No results for input(s): TP, ALB, TBIL, AP, SGOT, GPT in the last 72 hours. No lab exists for component: DBIL Thyroid Studies Lab Results Component Value Date/Time TSH 0.09 (L) 12/26/2018 08:30 PM  
    
 
 
Significant Diagnostic Studies: Nm Acute Gi Bleed Scan Result Date: 12/30/2018 EXAM: Nuclear medicine bleeding exam INDICATION: GI bleed, reported decreased blood pressure preceding evening COMPARISON: Nuclear medicine GI bleeding scan from 12/29/2018 at 2017 hours, CT of the abdomen and pelvis from 12/29/2018. TECHNIQUE: 20 hour delayed overhead anterior and posterior scintigraphic images were obtained. No repeat injection of radiotracer material. _______________ FINDINGS: Positive examination with radiotracer material in the distal right colon to splenic flexure. Persistent physiologic activity in the liver and spleen. Central upper abdominal tracer activity, potentially gastric but of uncertain location. _______________ IMPRESSION: 1. Positive examination with unknown site or time of bleeding given the 20 hour delayed acquisition.  Given the patient's known gastric ulcer, consider repeat upper endoscopy and potentially colonoscopy.   > Discussed with Dr. [place] at 1600 hours on 12/30/2018. Nm Acute Gi Bleed Scan Result Date: 12/29/2018 EXAM: GI BLEEDING SCAN: History: GI bleed. Rectal bleeding, gastric ulcer Site of injection: Right upper arm Technique: Imaging of the abdomen and pelvis was performed after the intravenous administration of  33.0 mCi of Tc 99m RBCs. Imaging was performed up to 60 minutes. _______________ FINDINGS: There is no evidence of abnormal activity to suggest active GI bleed. _______________ IMPRESSION: No evidence of active GI bleed. Xr Spine Lumb 2 Or 3 V Result Date: 1/1/2019 EXAM: Lumbar spine. INDICATION: Pain. COMPARISON: None. VIEWS: 3. _______________ FINDINGS: No evidence for [fracture or subluxation]. There is preservation of vertebral body height. Disc spaces are preserved. Mild spinal degenerative changes. Mild levocurvature of the lumbar spine. Prevertebral soft tissues are unremarkable. The thoracolumbar junction is contiguous. Bone marrow mineralization is within normal limits. _______________ IMPRESSION: 1. No evidence for fracture or subluxation. 2. Mild degenerative changes. Ct Abd Pelv Wo Cont Result Date: 12/29/2018 EXAM: CT ABD PELV WO CONT CLINICAL INDICATION/HISTORY: Epigastric upper abdominal pain COMPARISON: None. TECHNIQUE:   CT of the abdomen and pelvis without intravenous contrast administration. Coronal and sagittal reformats were generated and reviewed. One or more dose reduction techniques were used on this CT: automated exposure control, adjustment of the mAs and/or kVp according to patient size, and iterative reconstruction techniques. The specific techniques used on this CT exam have been documented in the patient's electronic medical record. _______________ FINDINGS: LOWER THORAX:  No acute pulmonary infiltrate.   No pleural effusion. No global cardiomegaly or pericardial effusion. LIVER AND BILIARY: No suspicious liver lesions on this unenhanced CT. No biliary dilation. Gallbladder unremarkable. SPLEEN:  Normal. PANCREAS:  Normal. ADRENALS:  Normal. KIDNEYS:  Normal. LYMPH NODES:  No mesenteric or retroperitoneal lymphadenopathy. GI TRACT: Normal caliber small and large bowel loops. No morphology of bowel obstruction. No bowel wall thickening. Normal appendix. PELVIC ORGANS:  Bladder is normal in appearance. No acute abnormality. VASCULATURE:  Normal caliber lower thoracic and abdominal aorta OTHER:   No ascites or free intraperitoneal air. OSSEOUS STRUCTURES:  No acute osseous abnormality. _______________ IMPRESSION: No acute or focal abnormality to explain patient's upper abdominal pain. [facility] CC: Kay Gilbert MD

## 2019-01-02 ENCOUNTER — HOME CARE VISIT (OUTPATIENT)
Dept: HOME HEALTH SERVICES | Facility: HOME HEALTH | Age: 39
End: 2019-01-02

## 2019-01-03 ENCOUNTER — HOME CARE VISIT (OUTPATIENT)
Dept: SCHEDULING | Facility: HOME HEALTH | Age: 39
End: 2019-01-03
Payer: COMMERCIAL

## 2019-01-03 ENCOUNTER — HOME CARE VISIT (OUTPATIENT)
Dept: HOME HEALTH SERVICES | Facility: HOME HEALTH | Age: 39
End: 2019-01-03
Payer: COMMERCIAL

## 2019-01-03 VITALS
TEMPERATURE: 97.3 F | HEIGHT: 69 IN | BODY MASS INDEX: 52.81 KG/M2 | SYSTOLIC BLOOD PRESSURE: 116 MMHG | OXYGEN SATURATION: 98 % | RESPIRATION RATE: 18 BRPM | HEART RATE: 80 BPM | DIASTOLIC BLOOD PRESSURE: 66 MMHG

## 2019-01-03 VITALS
OXYGEN SATURATION: 98 % | HEART RATE: 107 BPM | TEMPERATURE: 98 F | SYSTOLIC BLOOD PRESSURE: 126 MMHG | RESPIRATION RATE: 18 BRPM | DIASTOLIC BLOOD PRESSURE: 66 MMHG

## 2019-01-03 PROCEDURE — G0151 HHCP-SERV OF PT,EA 15 MIN: HCPCS

## 2019-01-03 PROCEDURE — 400013 HH SOC

## 2019-01-03 PROCEDURE — G0299 HHS/HOSPICE OF RN EA 15 MIN: HCPCS

## 2019-01-04 ENCOUNTER — HOME CARE VISIT (OUTPATIENT)
Dept: HOME HEALTH SERVICES | Facility: HOME HEALTH | Age: 39
End: 2019-01-04
Payer: COMMERCIAL

## 2019-01-04 ENCOUNTER — HOME CARE VISIT (OUTPATIENT)
Dept: SCHEDULING | Facility: HOME HEALTH | Age: 39
End: 2019-01-04
Payer: COMMERCIAL

## 2019-01-04 PROCEDURE — G0152 HHCP-SERV OF OT,EA 15 MIN: HCPCS

## 2019-01-05 ENCOUNTER — HOME CARE VISIT (OUTPATIENT)
Dept: SCHEDULING | Facility: HOME HEALTH | Age: 39
End: 2019-01-05
Payer: COMMERCIAL

## 2019-01-05 PROCEDURE — G0299 HHS/HOSPICE OF RN EA 15 MIN: HCPCS

## 2019-01-06 VITALS
OXYGEN SATURATION: 98 % | DIASTOLIC BLOOD PRESSURE: 80 MMHG | SYSTOLIC BLOOD PRESSURE: 134 MMHG | TEMPERATURE: 97 F | HEART RATE: 76 BPM | RESPIRATION RATE: 18 BRPM

## 2019-01-08 ENCOUNTER — HOME CARE VISIT (OUTPATIENT)
Dept: SCHEDULING | Facility: HOME HEALTH | Age: 39
End: 2019-01-08
Payer: COMMERCIAL

## 2019-01-08 VITALS — OXYGEN SATURATION: 98 % | HEART RATE: 72 BPM | DIASTOLIC BLOOD PRESSURE: 78 MMHG | SYSTOLIC BLOOD PRESSURE: 128 MMHG

## 2019-01-08 PROCEDURE — G0157 HHC PT ASSISTANT EA 15: HCPCS

## 2019-01-09 ENCOUNTER — HOME CARE VISIT (OUTPATIENT)
Dept: SCHEDULING | Facility: HOME HEALTH | Age: 39
End: 2019-01-09
Payer: COMMERCIAL

## 2019-01-09 VITALS
TEMPERATURE: 97.3 F | BODY MASS INDEX: 51.24 KG/M2 | RESPIRATION RATE: 16 BRPM | WEIGHT: 315 LBS | DIASTOLIC BLOOD PRESSURE: 62 MMHG | HEART RATE: 100 BPM | SYSTOLIC BLOOD PRESSURE: 114 MMHG | OXYGEN SATURATION: 95 %

## 2019-01-09 PROCEDURE — G0156 HHCP-SVS OF AIDE,EA 15 MIN: HCPCS

## 2019-01-09 PROCEDURE — G0299 HHS/HOSPICE OF RN EA 15 MIN: HCPCS

## 2019-01-10 ENCOUNTER — HOME CARE VISIT (OUTPATIENT)
Dept: SCHEDULING | Facility: HOME HEALTH | Age: 39
End: 2019-01-10
Payer: COMMERCIAL

## 2019-01-10 PROCEDURE — G0157 HHC PT ASSISTANT EA 15: HCPCS

## 2019-01-10 PROCEDURE — G0152 HHCP-SERV OF OT,EA 15 MIN: HCPCS

## 2019-01-11 ENCOUNTER — HOME CARE VISIT (OUTPATIENT)
Dept: SCHEDULING | Facility: HOME HEALTH | Age: 39
End: 2019-01-11
Payer: COMMERCIAL

## 2019-01-11 ENCOUNTER — HOSPITAL ENCOUNTER (OUTPATIENT)
Dept: LAB | Age: 39
Discharge: HOME | End: 2019-01-11
Payer: COMMERCIAL

## 2019-01-11 VITALS
HEART RATE: 111 BPM | RESPIRATION RATE: 16 BRPM | OXYGEN SATURATION: 96 % | SYSTOLIC BLOOD PRESSURE: 110 MMHG | DIASTOLIC BLOOD PRESSURE: 70 MMHG | TEMPERATURE: 98.8 F

## 2019-01-11 VITALS
TEMPERATURE: 98.5 F | OXYGEN SATURATION: 96 % | SYSTOLIC BLOOD PRESSURE: 121 MMHG | HEART RATE: 105 BPM | DIASTOLIC BLOOD PRESSURE: 80 MMHG

## 2019-01-11 LAB
25(OH)D3 SERPL-MCNC: 13.1 NG/ML (ref 30–100)
ALBUMIN SERPL-MCNC: 2.6 G/DL (ref 3.4–5)
ALBUMIN/GLOB SERPL: 1 {RATIO} (ref 0.8–1.7)
ALP SERPL-CCNC: 63 U/L (ref 45–117)
ALT SERPL-CCNC: 39 U/L (ref 16–61)
ANION GAP SERPL CALC-SCNC: 8 MMOL/L (ref 3–18)
APPEARANCE UR: CLEAR
AST SERPL-CCNC: 30 U/L (ref 15–37)
BACTERIA URNS QL MICRO: ABNORMAL /HPF
BASOPHILS # BLD: 0 K/UL (ref 0–0.1)
BASOPHILS NFR BLD: 0 % (ref 0–2)
BILIRUB SERPL-MCNC: 0.3 MG/DL (ref 0.2–1)
BILIRUB UR QL: NEGATIVE
BUN SERPL-MCNC: 21 MG/DL (ref 7–18)
BUN/CREAT SERPL: 13 (ref 12–20)
CALCIUM SERPL-MCNC: 7.6 MG/DL (ref 8.5–10.1)
CALCIUM SERPL-MCNC: 7.8 MG/DL (ref 8.5–10.1)
CHLORIDE SERPL-SCNC: 105 MMOL/L (ref 100–108)
CO2 SERPL-SCNC: 28 MMOL/L (ref 21–32)
COLOR UR: YELLOW
CREAT SERPL-MCNC: 1.63 MG/DL (ref 0.6–1.3)
CREAT UR-MCNC: 81 MG/DL (ref 30–125)
CREAT UR-MCNC: 81 MG/DL (ref 30–125)
DIFFERENTIAL METHOD BLD: ABNORMAL
EOSINOPHIL # BLD: 0 K/UL (ref 0–0.4)
EOSINOPHIL NFR BLD: 0 % (ref 0–5)
EPITH CASTS URNS QL MICRO: NEGATIVE /LPF (ref 0–5)
ERYTHROCYTE [DISTWIDTH] IN BLOOD BY AUTOMATED COUNT: 16.1 % (ref 11.6–14.5)
GLOBULIN SER CALC-MCNC: 2.6 G/DL (ref 2–4)
GLUCOSE SERPL-MCNC: 89 MG/DL (ref 74–99)
GLUCOSE UR STRIP.AUTO-MCNC: NEGATIVE MG/DL
HCT VFR BLD AUTO: 30.6 % (ref 36–48)
HGB BLD-MCNC: 9.1 G/DL (ref 13–16)
HGB UR QL STRIP: NEGATIVE
KETONES UR QL STRIP.AUTO: NEGATIVE MG/DL
LEUKOCYTE ESTERASE UR QL STRIP.AUTO: NEGATIVE
LYMPHOCYTES # BLD: 1.1 K/UL (ref 0.9–3.6)
LYMPHOCYTES NFR BLD: 17 % (ref 21–52)
MAGNESIUM SERPL-MCNC: 1.7 MG/DL (ref 1.6–2.6)
MCH RBC QN AUTO: 28.6 PG (ref 24–34)
MCHC RBC AUTO-ENTMCNC: 29.7 G/DL (ref 31–37)
MCV RBC AUTO: 96.2 FL (ref 74–97)
MICROALBUMIN UR-MCNC: 95 MG/DL (ref 0–3)
MICROALBUMIN/CREAT UR-RTO: 1173 MG/G (ref 0–30)
MONOCYTES # BLD: 0.9 K/UL (ref 0.05–1.2)
MONOCYTES NFR BLD: 15 % (ref 3–10)
NEUTS SEG # BLD: 4.1 K/UL (ref 1.8–8)
NEUTS SEG NFR BLD: 68 % (ref 40–73)
NITRITE UR QL STRIP.AUTO: NEGATIVE
PH UR STRIP: 5 [PH] (ref 5–8)
PHOSPHATE SERPL-MCNC: 2.7 MG/DL (ref 2.5–4.9)
PLATELET # BLD AUTO: 333 K/UL (ref 135–420)
PMV BLD AUTO: 8.8 FL (ref 9.2–11.8)
POTASSIUM SERPL-SCNC: 4 MMOL/L (ref 3.5–5.5)
PROT SERPL-MCNC: 5.2 G/DL (ref 6.4–8.2)
PROT UR STRIP-MCNC: 100 MG/DL
PROT UR-MCNC: 119 MG/DL
PTH-INTACT SERPL-MCNC: 116.9 PG/ML (ref 18.4–88)
RBC # BLD AUTO: 3.18 M/UL (ref 4.7–5.5)
RBC #/AREA URNS HPF: NEGATIVE /HPF (ref 0–5)
SODIUM SERPL-SCNC: 141 MMOL/L (ref 136–145)
SP GR UR REFRACTOMETRY: 1.01 (ref 1–1.03)
URATE SERPL-MCNC: 12.9 MG/DL (ref 2.6–7.2)
UROBILINOGEN UR QL STRIP.AUTO: 0.2 EU/DL (ref 0.2–1)
WBC # BLD AUTO: 6.1 K/UL (ref 4.6–13.2)
WBC URNS QL MICRO: NEGATIVE /HPF (ref 0–5)

## 2019-01-11 PROCEDURE — 82043 UR ALBUMIN QUANTITATIVE: CPT

## 2019-01-11 PROCEDURE — 80061 LIPID PANEL: CPT

## 2019-01-11 PROCEDURE — 81001 URINALYSIS AUTO W/SCOPE: CPT

## 2019-01-11 PROCEDURE — 84156 ASSAY OF PROTEIN URINE: CPT

## 2019-01-11 PROCEDURE — 82570 ASSAY OF URINE CREATININE: CPT

## 2019-01-11 PROCEDURE — G0299 HHS/HOSPICE OF RN EA 15 MIN: HCPCS

## 2019-01-11 PROCEDURE — 83970 ASSAY OF PARATHORMONE: CPT

## 2019-01-11 PROCEDURE — 84100 ASSAY OF PHOSPHORUS: CPT

## 2019-01-11 PROCEDURE — 83735 ASSAY OF MAGNESIUM: CPT

## 2019-01-11 PROCEDURE — 85025 COMPLETE CBC W/AUTO DIFF WBC: CPT

## 2019-01-11 PROCEDURE — 84443 ASSAY THYROID STIM HORMONE: CPT

## 2019-01-11 PROCEDURE — 84550 ASSAY OF BLOOD/URIC ACID: CPT

## 2019-01-11 PROCEDURE — 80053 COMPREHEN METABOLIC PANEL: CPT

## 2019-01-11 PROCEDURE — 82306 VITAMIN D 25 HYDROXY: CPT

## 2019-01-11 PROCEDURE — 36415 COLL VENOUS BLD VENIPUNCTURE: CPT

## 2019-01-12 ENCOUNTER — HOME CARE VISIT (OUTPATIENT)
Dept: HOME HEALTH SERVICES | Facility: HOME HEALTH | Age: 39
End: 2019-01-12
Payer: COMMERCIAL

## 2019-01-15 ENCOUNTER — HOME CARE VISIT (OUTPATIENT)
Dept: SCHEDULING | Facility: HOME HEALTH | Age: 39
End: 2019-01-15
Payer: COMMERCIAL

## 2019-01-15 VITALS
DIASTOLIC BLOOD PRESSURE: 93 MMHG | HEART RATE: 119 BPM | TEMPERATURE: 97.9 F | RESPIRATION RATE: 16 BRPM | OXYGEN SATURATION: 96 % | SYSTOLIC BLOOD PRESSURE: 137 MMHG

## 2019-01-15 PROCEDURE — G0157 HHC PT ASSISTANT EA 15: HCPCS

## 2019-01-15 PROCEDURE — G0299 HHS/HOSPICE OF RN EA 15 MIN: HCPCS

## 2019-01-15 PROCEDURE — G0156 HHCP-SVS OF AIDE,EA 15 MIN: HCPCS

## 2019-01-16 ENCOUNTER — HOME CARE VISIT (OUTPATIENT)
Dept: SCHEDULING | Facility: HOME HEALTH | Age: 39
End: 2019-01-16
Payer: COMMERCIAL

## 2019-01-16 LAB
CHOLEST SERPL-MCNC: 210 MG/DL
HDLC SERPL-MCNC: 38 MG/DL (ref 40–60)
HDLC SERPL: 5.5 {RATIO} (ref 0–5)
LDLC SERPL CALC-MCNC: ABNORMAL MG/DL (ref 0–100)
LIPID PROFILE,FLP: ABNORMAL
TRIGL SERPL-MCNC: 446 MG/DL (ref ?–150)
TSH SERPL DL<=0.05 MIU/L-ACNC: 0.21 UIU/ML (ref 0.36–3.74)
VLDLC SERPL CALC-MCNC: ABNORMAL MG/DL

## 2019-01-16 PROCEDURE — G0152 HHCP-SERV OF OT,EA 15 MIN: HCPCS

## 2019-01-17 ENCOUNTER — HOME CARE VISIT (OUTPATIENT)
Dept: SCHEDULING | Facility: HOME HEALTH | Age: 39
End: 2019-01-17
Payer: COMMERCIAL

## 2019-01-17 VITALS — DIASTOLIC BLOOD PRESSURE: 85 MMHG | SYSTOLIC BLOOD PRESSURE: 120 MMHG

## 2019-01-17 LAB
FAX TO INFO,FAXT: NORMAL
FAX TO NUMBER,FAXN: NORMAL

## 2019-01-17 PROCEDURE — G0157 HHC PT ASSISTANT EA 15: HCPCS

## 2019-01-18 ENCOUNTER — HOME CARE VISIT (OUTPATIENT)
Dept: SCHEDULING | Facility: HOME HEALTH | Age: 39
End: 2019-01-18
Payer: COMMERCIAL

## 2019-01-18 ENCOUNTER — HOME CARE VISIT (OUTPATIENT)
Dept: HOME HEALTH SERVICES | Facility: HOME HEALTH | Age: 39
End: 2019-01-18
Payer: COMMERCIAL

## 2019-01-18 PROCEDURE — G0152 HHCP-SERV OF OT,EA 15 MIN: HCPCS

## 2019-01-21 VITALS — OXYGEN SATURATION: 95 % | DIASTOLIC BLOOD PRESSURE: 82 MMHG | SYSTOLIC BLOOD PRESSURE: 118 MMHG | HEART RATE: 96 BPM

## 2019-01-22 ENCOUNTER — HOME CARE VISIT (OUTPATIENT)
Dept: SCHEDULING | Facility: HOME HEALTH | Age: 39
End: 2019-01-22
Payer: COMMERCIAL

## 2019-01-22 VITALS
HEART RATE: 91 BPM | WEIGHT: 315 LBS | BODY MASS INDEX: 48.44 KG/M2 | SYSTOLIC BLOOD PRESSURE: 123 MMHG | OXYGEN SATURATION: 98 % | RESPIRATION RATE: 16 BRPM | TEMPERATURE: 98 F | DIASTOLIC BLOOD PRESSURE: 73 MMHG

## 2019-01-22 PROCEDURE — G0156 HHCP-SVS OF AIDE,EA 15 MIN: HCPCS

## 2019-01-22 PROCEDURE — G0157 HHC PT ASSISTANT EA 15: HCPCS

## 2019-01-22 PROCEDURE — G0299 HHS/HOSPICE OF RN EA 15 MIN: HCPCS

## 2019-01-23 ENCOUNTER — HOME CARE VISIT (OUTPATIENT)
Dept: SCHEDULING | Facility: HOME HEALTH | Age: 39
End: 2019-01-23
Payer: COMMERCIAL

## 2019-01-23 PROCEDURE — G0152 HHCP-SERV OF OT,EA 15 MIN: HCPCS

## 2019-01-24 VITALS — DIASTOLIC BLOOD PRESSURE: 83 MMHG | SYSTOLIC BLOOD PRESSURE: 129 MMHG | OXYGEN SATURATION: 95 % | HEART RATE: 91 BPM

## 2019-01-25 ENCOUNTER — HOME CARE VISIT (OUTPATIENT)
Dept: SCHEDULING | Facility: HOME HEALTH | Age: 39
End: 2019-01-25
Payer: COMMERCIAL

## 2019-01-25 PROCEDURE — G0157 HHC PT ASSISTANT EA 15: HCPCS

## 2019-01-26 ENCOUNTER — HOME CARE VISIT (OUTPATIENT)
Dept: HOME HEALTH SERVICES | Facility: HOME HEALTH | Age: 39
End: 2019-01-26
Payer: COMMERCIAL

## 2019-01-29 ENCOUNTER — HOME CARE VISIT (OUTPATIENT)
Dept: HOME HEALTH SERVICES | Facility: HOME HEALTH | Age: 39
End: 2019-01-29
Payer: COMMERCIAL

## 2019-01-31 ENCOUNTER — HOME CARE VISIT (OUTPATIENT)
Dept: HOME HEALTH SERVICES | Facility: HOME HEALTH | Age: 39
End: 2019-01-31
Payer: COMMERCIAL

## 2019-02-05 ENCOUNTER — HOME CARE VISIT (OUTPATIENT)
Dept: SCHEDULING | Facility: HOME HEALTH | Age: 39
End: 2019-02-05
Payer: COMMERCIAL

## 2019-02-09 ENCOUNTER — HOSPITAL ENCOUNTER (OUTPATIENT)
Dept: LAB | Age: 39
Discharge: HOME | End: 2019-02-09
Payer: COMMERCIAL

## 2019-02-09 LAB
WBC #/AREA STL HPF: NORMAL /HPF

## 2019-02-09 PROCEDURE — 87206 SMEAR FLUORESCENT/ACID STAI: CPT

## 2019-02-09 PROCEDURE — 89055 LEUKOCYTE ASSESSMENT FECAL: CPT

## 2019-02-09 PROCEDURE — 87045 FECES CULTURE AEROBIC BACT: CPT

## 2019-02-09 PROCEDURE — 83520 IMMUNOASSAY QUANT NOS NONAB: CPT

## 2019-02-09 PROCEDURE — 83993 ASSAY FOR CALPROTECTIN FECAL: CPT

## 2019-02-09 PROCEDURE — 87329 GIARDIA AG IA: CPT

## 2019-02-09 PROCEDURE — 87177 OVA AND PARASITES SMEARS: CPT

## 2019-02-12 LAB
BACTERIA SPEC CULT: NORMAL
ELASTASE PANC STL-MCNT: >500 UG ELAST./G
SERVICE CMNT-IMP: NORMAL

## 2019-02-13 ENCOUNTER — HOSPITAL ENCOUNTER (OUTPATIENT)
Dept: LAB | Age: 39
Discharge: HOME | End: 2019-02-13
Payer: COMMERCIAL

## 2019-02-13 LAB
APPEARANCE UR: CLEAR
APTT PPP: 27.8 SEC (ref 23–36.4)
BACTERIA URNS QL MICRO: ABNORMAL /HPF
BILIRUB UR QL: NEGATIVE
CALPROTECTIN STL-MCNT: 186 UG/G (ref 0–120)
CALPROTECTIN STL-MCNT: 193 UG/G (ref 0–120)
CALPROTECTIN STL-MCNT: 198 UG/G (ref 0–120)
COLOR UR: YELLOW
CRYPTOSP STL QL ACID FAST STN: NORMAL
EPITH CASTS URNS QL MICRO: NEGATIVE /LPF (ref 0–5)
ERYTHROCYTE [DISTWIDTH] IN BLOOD BY AUTOMATED COUNT: 16.3 % (ref 11.6–14.5)
ERYTHROCYTE [SEDIMENTATION RATE] IN BLOOD: 31 MM/HR (ref 0–15)
GLUCOSE UR STRIP.AUTO-MCNC: NEGATIVE MG/DL
HCT VFR BLD AUTO: 37.1 % (ref 36–48)
HGB BLD-MCNC: 11.2 G/DL (ref 13–16)
HGB UR QL STRIP: NEGATIVE
HYALINE CASTS URNS QL MICRO: ABNORMAL /LPF (ref 0–2)
I BELLI SPEC QL ACID FAST MOD KINY STN: NORMAL
INR PPP: 1 (ref 0.8–1.2)
KETONES UR QL STRIP.AUTO: ABNORMAL MG/DL
LDH SERPL L TO P-CCNC: 468 U/L (ref 81–234)
LEUKOCYTE ESTERASE UR QL STRIP.AUTO: NEGATIVE
MCH RBC QN AUTO: 27.9 PG (ref 24–34)
MCHC RBC AUTO-ENTMCNC: 30.2 G/DL (ref 31–37)
MCV RBC AUTO: 92.3 FL (ref 74–97)
MUCOUS THREADS URNS QL MICRO: ABNORMAL /LPF
NITRITE UR QL STRIP.AUTO: NEGATIVE
PH UR STRIP: 5 [PH] (ref 5–8)
PLATELET # BLD AUTO: 390 K/UL (ref 135–420)
PMV BLD AUTO: 9 FL (ref 9.2–11.8)
PROT UR STRIP-MCNC: 100 MG/DL
PROTHROMBIN TIME: 12.7 SEC (ref 11.5–15.2)
RBC # BLD AUTO: 4.02 M/UL (ref 4.7–5.5)
RBC #/AREA URNS HPF: NEGATIVE /HPF (ref 0–5)
SP GR UR REFRACTOMETRY: 1.02 (ref 1–1.03)
SPECIMEN SOURCE: NORMAL
UROBILINOGEN UR QL STRIP.AUTO: 0.2 EU/DL (ref 0.2–1)
WBC # BLD AUTO: 5.8 K/UL (ref 4.6–13.2)
WBC URNS QL MICRO: ABNORMAL /HPF (ref 0–5)

## 2019-02-13 PROCEDURE — 86677 HELICOBACTER PYLORI ANTIBODY: CPT

## 2019-02-13 PROCEDURE — 86140 C-REACTIVE PROTEIN: CPT

## 2019-02-13 PROCEDURE — 85027 COMPLETE CBC AUTOMATED: CPT

## 2019-02-13 PROCEDURE — 85730 THROMBOPLASTIN TIME PARTIAL: CPT

## 2019-02-13 PROCEDURE — 81001 URINALYSIS AUTO W/SCOPE: CPT

## 2019-02-13 PROCEDURE — 36415 COLL VENOUS BLD VENIPUNCTURE: CPT

## 2019-02-13 PROCEDURE — 85610 PROTHROMBIN TIME: CPT

## 2019-02-13 PROCEDURE — 83615 LACTATE (LD) (LDH) ENZYME: CPT

## 2019-02-13 PROCEDURE — 85652 RBC SED RATE AUTOMATED: CPT

## 2019-02-13 PROCEDURE — 86480 TB TEST CELL IMMUN MEASURE: CPT

## 2019-02-14 LAB
CRP SERPL-MCNC: 1.3 MG/DL (ref 0–0.3)
G LAMBLIA AG STL QL IA: NEGATIVE
G LAMBLIA AG STL QL IA: NEGATIVE
H PYLORI IGA SER-ACNC: <9 UNITS (ref 0–8.9)
H PYLORI IGG SER IA-ACNC: <0.8 INDEX VALUE (ref 0–0.79)
H PYLORI IGM SER-ACNC: <9 UNITS (ref 0–8.9)
O+P SPEC MICRO: NORMAL
O+P STL CONC: NORMAL
SPECIMEN SOURCE: NORMAL

## 2019-02-17 LAB
M TB IFN-G BLD-IMP: NEGATIVE
QUANTIFERON CRITERIA, QFI1T: NORMAL
QUANTIFERON MITOGEN VALUE: >10 IU/ML
QUANTIFERON NIL VALUE: 0.09 IU/ML
QUANTIFERON TB1 AG: 0.09 IU/ML
QUANTIFERON TB2 AG: 0.05 IU/ML

## 2019-02-18 LAB
FAX TO INFO,FAXT: NORMAL
FAX TO NUMBER,FAXN: NORMAL

## 2019-03-20 ENCOUNTER — HOME HEALTH ADMISSION (OUTPATIENT)
Dept: HOME HEALTH SERVICES | Facility: HOME HEALTH | Age: 39
End: 2019-03-20

## 2019-03-22 ENCOUNTER — HOME CARE VISIT (OUTPATIENT)
Dept: HOME HEALTH SERVICES | Facility: HOME HEALTH | Age: 39
End: 2019-03-22

## 2019-03-29 ENCOUNTER — HOME HEALTH ADMISSION (OUTPATIENT)
Dept: HOME HEALTH SERVICES | Facility: HOME HEALTH | Age: 39
End: 2019-03-29
Payer: COMMERCIAL

## 2019-04-02 ENCOUNTER — HOME CARE VISIT (OUTPATIENT)
Dept: SCHEDULING | Facility: HOME HEALTH | Age: 39
End: 2019-04-02
Payer: COMMERCIAL

## 2019-04-02 PROCEDURE — G0151 HHCP-SERV OF PT,EA 15 MIN: HCPCS

## 2019-04-02 PROCEDURE — 400013 HH SOC

## 2019-04-03 ENCOUNTER — HOME CARE VISIT (OUTPATIENT)
Dept: HOME HEALTH SERVICES | Facility: HOME HEALTH | Age: 39
End: 2019-04-03
Payer: COMMERCIAL

## 2019-04-03 VITALS
SYSTOLIC BLOOD PRESSURE: 130 MMHG | HEART RATE: 107 BPM | RESPIRATION RATE: 18 BRPM | OXYGEN SATURATION: 98 % | DIASTOLIC BLOOD PRESSURE: 73 MMHG

## 2019-04-05 ENCOUNTER — HOME CARE VISIT (OUTPATIENT)
Dept: SCHEDULING | Facility: HOME HEALTH | Age: 39
End: 2019-04-05
Payer: COMMERCIAL

## 2019-04-05 PROCEDURE — G0157 HHC PT ASSISTANT EA 15: HCPCS

## 2019-04-06 VITALS
HEART RATE: 92 BPM | OXYGEN SATURATION: 98 % | DIASTOLIC BLOOD PRESSURE: 81 MMHG | SYSTOLIC BLOOD PRESSURE: 119 MMHG | TEMPERATURE: 97.4 F

## 2019-04-08 ENCOUNTER — HOME CARE VISIT (OUTPATIENT)
Dept: SCHEDULING | Facility: HOME HEALTH | Age: 39
End: 2019-04-08
Payer: COMMERCIAL

## 2019-04-08 PROCEDURE — G0157 HHC PT ASSISTANT EA 15: HCPCS

## 2019-04-09 VITALS
TEMPERATURE: 97.7 F | SYSTOLIC BLOOD PRESSURE: 127 MMHG | HEART RATE: 103 BPM | OXYGEN SATURATION: 97 % | DIASTOLIC BLOOD PRESSURE: 64 MMHG

## 2019-04-10 ENCOUNTER — HOME CARE VISIT (OUTPATIENT)
Dept: SCHEDULING | Facility: HOME HEALTH | Age: 39
End: 2019-04-10
Payer: COMMERCIAL

## 2019-04-10 PROCEDURE — G0157 HHC PT ASSISTANT EA 15: HCPCS

## 2019-04-11 VITALS
DIASTOLIC BLOOD PRESSURE: 81 MMHG | SYSTOLIC BLOOD PRESSURE: 140 MMHG | HEART RATE: 98 BPM | OXYGEN SATURATION: 91 % | TEMPERATURE: 98.1 F

## 2019-04-16 ENCOUNTER — HOME CARE VISIT (OUTPATIENT)
Dept: SCHEDULING | Facility: HOME HEALTH | Age: 39
End: 2019-04-16
Payer: COMMERCIAL

## 2019-04-16 PROCEDURE — G0157 HHC PT ASSISTANT EA 15: HCPCS

## 2019-04-18 VITALS
OXYGEN SATURATION: 94 % | SYSTOLIC BLOOD PRESSURE: 125 MMHG | TEMPERATURE: 97.9 F | HEART RATE: 96 BPM | DIASTOLIC BLOOD PRESSURE: 79 MMHG

## 2019-04-19 ENCOUNTER — HOME CARE VISIT (OUTPATIENT)
Dept: HOME HEALTH SERVICES | Facility: HOME HEALTH | Age: 39
End: 2019-04-19
Payer: COMMERCIAL

## 2019-04-22 ENCOUNTER — HOSPITAL ENCOUNTER (OUTPATIENT)
Dept: LAB | Age: 39
Discharge: HOME | End: 2019-04-22
Payer: COMMERCIAL

## 2019-04-22 LAB
BASOPHILS # BLD: 0 K/UL (ref 0–0.1)
BASOPHILS NFR BLD: 0 % (ref 0–2)
CHOLEST SERPL-MCNC: 168 MG/DL
DIFFERENTIAL METHOD BLD: ABNORMAL
EOSINOPHIL # BLD: 0.1 K/UL (ref 0–0.4)
EOSINOPHIL NFR BLD: 1 % (ref 0–5)
ERYTHROCYTE [DISTWIDTH] IN BLOOD BY AUTOMATED COUNT: 14.6 % (ref 11.6–14.5)
HCT VFR BLD AUTO: 29.2 % (ref 36–48)
HDLC SERPL-MCNC: 33 MG/DL (ref 40–60)
HDLC SERPL: 5.1 {RATIO} (ref 0–5)
HGB BLD-MCNC: 9.2 G/DL (ref 13–16)
LDLC SERPL CALC-MCNC: 98.4 MG/DL (ref 0–100)
LIPID PROFILE,FLP: ABNORMAL
LYMPHOCYTES # BLD: 1.5 K/UL (ref 0.9–3.6)
LYMPHOCYTES NFR BLD: 19 % (ref 21–52)
MCH RBC QN AUTO: 28.9 PG (ref 24–34)
MCHC RBC AUTO-ENTMCNC: 31.5 G/DL (ref 31–37)
MCV RBC AUTO: 91.8 FL (ref 74–97)
MONOCYTES # BLD: 0.8 K/UL (ref 0.05–1.2)
MONOCYTES NFR BLD: 10 % (ref 3–10)
NEUTS SEG # BLD: 5.4 K/UL (ref 1.8–8)
NEUTS SEG NFR BLD: 70 % (ref 40–73)
PLATELET # BLD AUTO: 397 K/UL (ref 135–420)
PMV BLD AUTO: 8.6 FL (ref 9.2–11.8)
RBC # BLD AUTO: 3.18 M/UL (ref 4.7–5.5)
TRIGL SERPL-MCNC: 183 MG/DL (ref ?–150)
VLDLC SERPL CALC-MCNC: 36.6 MG/DL
WBC # BLD AUTO: 7.8 K/UL (ref 4.6–13.2)

## 2019-04-22 PROCEDURE — 84403 ASSAY OF TOTAL TESTOSTERONE: CPT

## 2019-04-22 PROCEDURE — 36415 COLL VENOUS BLD VENIPUNCTURE: CPT

## 2019-04-22 PROCEDURE — 80061 LIPID PANEL: CPT

## 2019-04-22 PROCEDURE — 84443 ASSAY THYROID STIM HORMONE: CPT

## 2019-04-22 PROCEDURE — 85025 COMPLETE CBC W/AUTO DIFF WBC: CPT

## 2019-04-22 PROCEDURE — 84439 ASSAY OF FREE THYROXINE: CPT

## 2019-04-23 ENCOUNTER — HOME CARE VISIT (OUTPATIENT)
Dept: SCHEDULING | Facility: HOME HEALTH | Age: 39
End: 2019-04-23
Payer: COMMERCIAL

## 2019-04-23 VITALS
HEART RATE: 101 BPM | TEMPERATURE: 98.3 F | SYSTOLIC BLOOD PRESSURE: 138 MMHG | OXYGEN SATURATION: 99 % | DIASTOLIC BLOOD PRESSURE: 88 MMHG

## 2019-04-23 LAB — TESTOST SERPL-MCNC: 82 NG/DL (ref 264–916)

## 2019-04-23 PROCEDURE — G0151 HHCP-SERV OF PT,EA 15 MIN: HCPCS

## 2019-04-24 LAB
T4 FREE SERPL-MCNC: 1.2 NG/DL (ref 0.7–1.5)
TSH SERPL DL<=0.05 MIU/L-ACNC: 1.15 UIU/ML (ref 0.36–3.74)

## 2019-04-25 LAB
FAX TO INFO,FAXT: NORMAL
FAX TO NUMBER,FAXN: NORMAL

## 2019-05-10 ENCOUNTER — HOSPITAL ENCOUNTER (OUTPATIENT)
Dept: PHYSICAL THERAPY | Age: 39
Discharge: HOME | End: 2019-05-10
Payer: COMMERCIAL

## 2019-05-10 PROCEDURE — 97110 THERAPEUTIC EXERCISES: CPT

## 2019-05-10 PROCEDURE — 97162 PT EVAL MOD COMPLEX 30 MIN: CPT

## 2019-05-10 NOTE — PROGRESS NOTES
PT DAILY TREATMENT NOTE/Hip/Knee/Ankle EVAL 10-18    Patient Name: Rito Sommers  Date:5/10/2019  : 1980  [x]  Patient  Verified  Payor: [place] Needs / Plan: Blanka King HMO / Product Type: HMO /    In time:1400  Out time:1453  Total Treatment Time (min): 48  Visit #: 1 of 12    Medicare/BCBS Only   Total Timed Codes (min):  38 1:1 Treatment Time:  53       Treatment Area: Unspecified abnormalities of gait and mobility [R26.9]    SUBJECTIVE  Pain Level (0-10 scale): 3/10 low back pain -chronic  [x]constant []intermittent []improving []worsening []no change since onset    Any medication changes, allergies to medications, adverse drug reactions, diagnosis change, or new procedure performed?: [x] No    [] Yes (see summary sheet for update)  Subjective functional status/changes:     PLOF: functionally independent  Limitations to PLOF: weakness, orthostatic hypotension  Mechanism of Injury: pt reports he was put on Prednisone for 8 months at 60 mg/day and he had gotten progressively weaker since the start of the medication,   Current symptoms/Complaints: weakness, hypotension, decreased gait tolerance, decreased endurance   Previous Treatment/Compliance: just finished home health physical therapy for 3-4 weeks  PMHx/Surgical Hx: heart disease, fibromyalgia, depression, arthritis, thyroid problems, HTN, psoriatic arthritis, rheumatoid arthritis  Work Hx: not currently working  Living Situation: wife, first floor apartment  Pt Goals: \"to be a normal person again\"  Barriers: [x]pain []financial []time []transportation []other  Motivation: good  Substance use: []Alcohol []Tobacco []other:   Cognition: A & O x 3    Other:    OBJECTIVE      15 min []Eval                  []Re-Eval       38 min Therapeutic Exercise:  [] See flow sheet :   Rationale: increase ROM, increase strength, improve balance and increase proprioception to improve the patients ability to restore previous level of function and restore strength With   [x] TE   [] TA   [] neuro   [] other: Patient Education: [x] Review HEP    [] Progressed/Changed HEP based on:   [] positioning   [] body mechanics   [] transfers   [] heat/ice application    [] other:        General Evaluation    Gait: tolerance of about 40 feet, externally rotated LEs, uses RW in community, furniture cruises in house  Posture: forward head, rounded shoulders  Palpation/Sensation: tingling in both hands/fingers when he first wakes up  Right hand dominant    Strength (MMT):                                            Hip L (1-5) R (1-5)   Hip Flexion 3+/5 3+/5   Hip Ext     Hip ABD 4/5 4/5   Hip ADD 4/5 4/5   Hip ER 3+/5 3+/5   Hip IR 3+/5 3+/5     Knee L (1-5) R (1-5)   Knee Flexion 3+/5 3+/5   Knee Extension 3+/5 3+/5   Ankle PF     Ankle DF     Other       UE L (1-5) R (1-5)   Shoulder Flexion 4/5 4/5   Shoulder ABD 4/5 4/5   Elbow flex 4/5 4/5   Elbow ext 4/5 4/5    strength 3+/4 3+/4       Pain Level (0-10 scale) post treatment: 0/10    ASSESSMENT/Changes in Function: 38 yo male who presents to [facility] with balance dysfunction and LE weakness. Patient reports he has been getting progressively weaker since he has been treated with an 8 month course of prednisone for his kidney disease. Patient has decreased endurance, strength, flexibility, impaired balance and gait dysfunction. Patient would benefit from skilled PT intervention to address the above deficits. Patient will continue to benefit from skilled PT services to modify and progress therapeutic interventions, address functional mobility deficits, address ROM deficits, address strength deficits, analyze and address soft tissue restrictions, analyze and cue movement patterns, analyze and modify body mechanics/ergonomics, assess and modify postural abnormalities and address imbalance/dizziness to attain remaining goals.      [x]  See Plan of Care  []  See progress note/recertification  []  See Discharge Summary Progress towards goals / Updated goals:    Short Term Goals: To be accomplished in 3 weeks:  1. Patient will be compliant with HEP to progress toward goals and restore functional mobility. Eval Status: issued this visit    2. Patient will improve FOTO score by 7 points to improve functional tolerance for exercise. Eval Status:FOTO 45    3. Pt will tolerate 10 minutes of standing therex during therapy session without seated rest break. Eval Status: pt able to tolerate 1 standing exercise prior to needing seated rest break    Long Term Goals: To be accomplished in 6 weeks:  1. Patient will be independent with HEP to progress toward goals of prolonged ambulation with LRAD. Eval Status: issued at [address]    2. Patient will improve FOTO score by 13 points to improve functional tolerance for prolonged ambulation of LRAD. Eval Status: FOTO 45      3. Patient will have 4+/5 bilateral LE strength to return to goals of prolonged ambulation.   Eval Status:   Hip L (1-5) R (1-5)   Hip Flexion 3+/5 3+/5   Hip Ext     Hip ABD 4/5 4/5   Hip ADD 4/5 4/5   Hip ER 3+/5 3+/5   Hip IR 3+/5 3+/5     Knee L (1-5) R (1-5)   Knee Flexion 3+/5 3+/5   Knee Extension 3+/5 3+/5   Ankle PF     Ankle DF     Other             PLAN  [x]  Upgrade activities as tolerated     [x]  Continue plan of care  []  Update interventions per flow sheet       []  Discharge due to:_  []  Other:_      Mary Dia, PT 5/10/2019  2:05 PM

## 2019-05-10 NOTE — PROGRESS NOTES
[facility]  [address], [address]  Ph [phone]  Fx [phone]    Plan of Care/ Statement of Necessity for Physical Therapy Services    Patient name: Zack Kaba Start of Care: 5/10/2019   Referral source: Hussein Carias MD : 1980    Medical Diagnosis: unsteadiness on feet   Onset Date:8 months ago    Treatment Diagnosis: Unspecified abnormalities of gait and mobility [R26.9]   Prior Hospitalization: see medical history Provider#: [id number]   Medications: Verified on Patient summary List    Comorbidities: heart disease, fibromyalgia, depression, arthritis, thyroid problems, HTN, psoriatic arthritis, rheumatoid arthritis   Prior Level of Function: functionally independent      The Plan of Care and following information is based on the information from the initial evaluation. Assessment/ key information: 38 yo male who presents to [facility] with balance dysfunction and LE weakness. Patient reports he has been getting progressively weaker since he has been treated with an 8 month course of prednisone for his kidney disease. Patient has decreased endurance, strength, flexibility, impaired balance and gait dysfunction. Patient would benefit from skilled PT intervention to address the above deficits. Patient will continue to benefit from skilled PT services to modify and progress therapeutic interventions, address functional mobility deficits, address ROM deficits, address strength deficits, analyze and address soft tissue restrictions, analyze and cue movement patterns, analyze and modify body mechanics/ergonomics, assess and modify postural abnormalities and address imbalance/dizziness to attain remaining goals.       Evaluation Complexity History MEDIUM  Complexity : 1-2 comorbidities / personal factors will impact the outcome/ POC ; Examination MEDIUM Complexity : 3 Standardized tests and measures addressing body structure, function, activity limitation and / or participation in recreation  ;Presentation MEDIUM Complexity : Evolving with changing characteristics  ; Clinical Decision Making MEDIUM Complexity : FOTO score of 26-74  Overall Complexity Rating: MEDIUM  Problem List: pain affecting function, decrease ROM, decrease strength, edema affecting function, impaired gait/ balance, decrease ADL/ functional abilitiies, decrease activity tolerance, decrease flexibility/ joint mobility and decrease transfer abilities   Treatment Plan may include any combination of the following: Therapeutic exercise, Therapeutic activities, Neuromuscular re-education, Physical agent/modality, Gait/balance training, Manual therapy, Patient education, Self Care training, Functional mobility training, Home safety training and Stair training  Patient / Family readiness to learn indicated by: asking questions, trying to perform skills and interest  Persons(s) to be included in education: patient (P)  Barriers to Learning/Limitations: None  Measures taken if barriers to learning: NA  Patient Goal (s): to be a normal person again  Patient Self Reported Health Status: poor  Rehabilitation Potential: good    Short Term Goals: To be accomplished in 3 weeks:  1. Patient will be compliant with HEP to progress toward goals and restore functional mobility. Eval Status: issued this visit     2. Patient will improve FOTO score by 7 points to improve functional tolerance for exercise. Eval Status:FOTO 45     3. Pt will tolerate 10 minutes of standing therex during therapy session without seated rest break. Eval Status: pt able to tolerate 1 standing exercise prior to needing seated rest break     Long Term Goals: To be accomplished in 6 weeks:  1. Patient will be independent with HEP to progress toward goals of prolonged ambulation with LRAD. Eval Status: issued at [address]  2.  Patient will improve FOTO score by 13 points to improve functional tolerance for prolonged ambulation of LRAD.  Eval Status: FOTO 45        3. Patient will have 4+/5 bilateral LE strength to return to goals of prolonged ambulation. Eval Status:   Hip L (1-5) R (1-5)   Hip Flexion 3+/5 3+/5   Hip Ext       Hip ABD 4/5 4/5   Hip ADD 4/5 4/5   Hip ER 3+/5 3+/5   Hip IR 3+/5 3+/5      Knee L (1-5) R (1-5)   Knee Flexion 3+/5 3+/5   Knee Extension 3+/5 3+/5   Ankle PF       Ankle DF       Other           Frequency / Duration: Patient to be seen 2 times per week for 6 weeks. Patient/ Caregiver education and instruction: Diagnosis, prognosis, self care, activity modification and exercises   [x]  Plan of care has been reviewed with PTA    Certification Period: 5/10/19-8/9/19  Cynthia Velarde, PT 5/10/2019 4:14 PM    ________________________________________________________________________    I certify that the above Therapy Services are being furnished while the patient is under my care. I agree with the treatment plan and certify that this therapy is necessary.     Physician's Signature:_____________________Date:____________TIME:________    [employer], Date and Time must be completed for valid certification **  Please sign and return to [facility] at [facility]  [address], [address] [facility]  Ph [phone]  Fx [phone]

## 2019-05-12 LAB
ATRIAL RATE: 118 BPM
CALCULATED P AXIS, ECG09: 50 DEGREES
CALCULATED R AXIS, ECG10: 4 DEGREES
CALCULATED T AXIS, ECG11: 91 DEGREES
DIAGNOSIS, 93000: NORMAL
P-R INTERVAL, ECG05: 114 MS
Q-T INTERVAL, ECG07: 302 MS
QRS DURATION, ECG06: 100 MS
QTC CALCULATION (BEZET), ECG08: 423 MS
VENTRICULAR RATE, ECG03: 118 BPM

## 2019-05-13 ENCOUNTER — HOSPITAL ENCOUNTER (OUTPATIENT)
Dept: PHYSICAL THERAPY | Age: 39
Discharge: HOME | End: 2019-05-13
Payer: COMMERCIAL

## 2019-05-13 PROCEDURE — 97110 THERAPEUTIC EXERCISES: CPT

## 2019-05-13 NOTE — PROGRESS NOTES
PT DAILY TREATMENT NOTE    Patient Name: Thien Holman  Date:2019  : 1980  [x]  Patient  Verified  Payor: Kylie Koenig / Plan: Asiya Zabala HMO / Product Type: HMO /    In time:532  Out time:615  Total Treatment Time (min): 43  Total Timed Codes (min): 43  1:1 Treatment Time ( only): 37   Visit #: 2 of 12    Treatment Area: Unspecified abnormalities of gait and mobility [R26.9]    SUBJECTIVE  Pain Level (0-10 scale): 0/10  Any medication changes, allergies to medications, adverse drug reactions, diagnosis change, or new procedure performed?: [x] No    [] Yes (see summary sheet for update)  Subjective functional status/changes:   [] No changes reported  Pt states that he didn't do his exercises at home    OBJECTIVE      43 min Therapeutic Exercise:  [] See flow sheet :   Rationale: increase ROM, increase strength, improve coordination, improve balance and increase proprioception to improve the patients ability to improve ambulation in his home          With   [] TE   [] TA   [] neuro   [] other: Patient Education: [x] Review HEP    [] Progressed/Changed HEP based on:   [] positioning   [] body mechanics   [] transfers   [] heat/ice application    [] other:      Other Objective/Functional Measures:       Pain Level (0-10 scale) post treatment: 0/10    ASSESSMENT/Changes in Function: Pt demonstrates good energy today completing 43 minutes of exercise however after 5-10 minutes in standing he needed to rest, however it was at the end of his session. Patient will continue to benefit from skilled PT services to address functional mobility deficits, address ROM deficits, address strength deficits, analyze and address soft tissue restrictions and analyze and cue movement patterns to attain remaining goals. []  See Plan of Care  []  See progress note/recertification  []  See Discharge Summary         Progress towards goals / Updated goals:  Short Term Goals: To be accomplished in 3 weeks:  1.  Patient will be compliant with HEP to progress toward goals and restore functional mobility. Eval Status: issued this visit  Current: Not compliant     2. Patient will improve FOTO score by 7 points to improve functional tolerance for exercise. Eval Status:FOTO 45     3. Pt will tolerate 10 minutes of standing therex during therapy session without seated rest break. Eval Status: pt able to tolerate 1 standing exercise prior to needing seated rest break  Current: Completed 5 minutes Progressing     Long Term Goals: To be accomplished in 6 weeks:  1. Patient will be independent with HEP to progress toward goals of prolonged ambulation with LRAD. Eval Status: issued at [facility]     2. Patient will improve FOTO score by 13 points to improve functional tolerance for prolonged ambulation of LRAD. Eval Status: FOTO 45        3. Patient will have 4+/5 bilateral LE strength to return to goals of prolonged ambulation.   Eval Status:   Hip L (1-5) R (1-5)   Hip Flexion 3+/5 3+/5   Hip Ext       Hip ABD 4/5 4/5   Hip ADD 4/5 4/5   Hip ER 3+/5 3+/5   Hip IR 3+/5 3+/5      Knee L (1-5) R (1-5)   Knee Flexion 3+/5 3+/5   Knee Extension 3+/5 3+/5   Ankle PF       Ankle DF       Other                     PLAN  []  Upgrade activities as tolerated     []  Continue plan of care  []  Update interventions per flow sheet       []  Discharge due to:_  []  Other:_      Dylna Petersen PTA 5/13/2019  3:13 PM    Future Appointments   Date Time Provider Gurpreet Kevin   5/13/2019  5:30 PM Ellen Araujo [facility]   5/17/2019  3:30 PM Dominga Granda PTA [facility]   5/20/2019  6:00 PM [facility]   5/22/2019  6:00 PM [facility]   5/29/2019  6:00 PM [facility]   5/30/2019  5:30 PM Maribel Augustine PT [facility]   6/4/2019  5:45 ELIZABETH Lawson [facility]   6/5/2019  5:30 PM [facility]   6/10/2019  5:00 PM [facility]   6/12/2019 5:00 PM [facility]   6/17/2019  5:00 PM [facility]   6/19/2019  5:30 PM Maribel Augustine PT [facility]

## 2019-05-17 ENCOUNTER — APPOINTMENT (OUTPATIENT)
Dept: PHYSICAL THERAPY | Age: 39
End: 2019-05-17
Payer: COMMERCIAL

## 2019-05-20 ENCOUNTER — HOSPITAL ENCOUNTER (OUTPATIENT)
Dept: PHYSICAL THERAPY | Age: 39
Discharge: HOME | End: 2019-05-20
Payer: COMMERCIAL

## 2019-05-20 PROCEDURE — 97110 THERAPEUTIC EXERCISES: CPT

## 2019-05-20 NOTE — PROGRESS NOTES
PT DAILY TREATMENT NOTE    Patient Name: Fabrice Estes  Date:2019  : 1980  [x]  Patient  Verified  Payor: Kristine Cummings / Plan: Arnold Hastings HMO / Product Type: HMO /    In time:5:50  Out time:6:39  Total Treatment Time (min): 49  Total Timed Codes (min): 49  1:1 Treatment Time (MC only): NA   Visit #: 3 of 12    Treatment Area: Unspecified abnormalities of gait and mobility [R26.9]    SUBJECTIVE  Pain Level (0-10 scale): 0/10  Any medication changes, allergies to medications, adverse drug reactions, diagnosis change, or new procedure performed?: [x] No    [] Yes (see summary sheet for update)  Subjective functional status/changes:   [] No changes reported  \"I don't have any pain right now. I feel alright. Still a little tired from being sick over the weekend. \"    OBJECTIVE      49 min Therapeutic Exercise:  [x] See flow sheet :   Rationale: increase ROM, increase strength and improve coordination to improve the patients ability to return to prior level of physical activity. With   [] TE   [] TA   [] neuro   [] other: Patient Education: [x] Review HEP    [] Progressed/Changed HEP based on:   [] positioning   [] body mechanics   [] transfers   [] heat/ice application    [] other:      Other Objective/Functional Measures:(Post Ex) BP: 110/72     Pain Level (0-10 scale) post treatment: 0/10    ASSESSMENT/Changes in Function: Pt tolerated session well. Pt did require multiple extended rest breaks due to increased fatigue. Pt BP normal at end of session, post standing ex. Patient will continue to benefit from skilled PT services to modify and progress therapeutic interventions, address functional mobility deficits, address ROM deficits, address strength deficits, analyze and address soft tissue restrictions, analyze and cue movement patterns, analyze and modify body mechanics/ergonomics and assess and modify postural abnormalities to attain remaining goals.      []  See Plan of Care  []  See progress note/recertification  []  See Discharge Summary         Progress towards goals / Updated goals:  Short Term Goals: To be accomplished in 3 weeks:  1. Patient will be compliant with HEP to progress toward goals and restore functional mobility. Eval Status: issued this visit  Current: Not compliant     2. Patient will improve FOTO score by 7 points to improve functional tolerance for exercise. Eval Status:FOTO 45     3. Pt will tolerate 10 minutes of standing therex during therapy session without seated rest break. Eval Status: pt able to tolerate 1 standing exercise prior to needing seated rest break  Current: Completed 5 minutes Progressing     Long Term Goals: To be accomplished in 6 weeks:  1. Patient will be independent with HEP to progress toward goals of prolonged ambulation with LRAD. Eval Status: issued at Eval     2. Patient will improve FOTO score by 13 points to improve functional tolerance for prolonged ambulation of LRAD. Eval Status: FOTO 45        3. Patient will have 4+/5 bilateral LE strength to return to goals of prolonged ambulation.   Eval Status:   Hip L (1-5) R (1-5)   Hip Flexion 3+/5 3+/5   Hip Ext       Hip ABD 4/5 4/5   Hip ADD 4/5 4/5   Hip ER 3+/5 3+/5   Hip IR 3+/5 3+/5      Knee L (1-5) R (1-5)   Knee Flexion 3+/5 3+/5   Knee Extension 3+/5 3+/5   Ankle PF       Ankle DF       Other                   PLAN  [x]  Upgrade activities as tolerated     [x]  Continue plan of care  []  Update interventions per flow sheet       []  Discharge due to:_  []  Other:_      Linda Jolly PTA 5/20/2019  6:27 PM    Future Appointments   Date Time Provider Gurpreet Kevin   5/22/2019  6:00 PM [facility]   5/29/2019  6:00 PM [facility]   5/30/2019  5:30 PM Robert Martinez PT [facility]   6/4/2019  5:30 PM Shante Dominguez PTA [facility]   6/5/2019  5:30 PM [facility]   6/10/2019  5:00 PM [facility] 6/12/2019  5:00 PM [place] [facility]   6/17/2019  5:00 PM [place] Client [facility]   6/19/2019  5:30 PM Nuria Millan PT [facility]

## 2019-05-22 ENCOUNTER — APPOINTMENT (OUTPATIENT)
Dept: PHYSICAL THERAPY | Age: 39
End: 2019-05-22
Payer: COMMERCIAL

## 2019-05-24 ENCOUNTER — HOSPITAL ENCOUNTER (OUTPATIENT)
Dept: PHYSICAL THERAPY | Age: 39
Discharge: HOME | End: 2019-05-24
Payer: COMMERCIAL

## 2019-05-24 PROCEDURE — 97110 THERAPEUTIC EXERCISES: CPT

## 2019-05-24 NOTE — PROGRESS NOTES
PT DAILY TREATMENT NOTE    Patient Name: Claribel Pang  Date:2019  : 1980  [x]  Patient  Verified  Payor: Jaquelin Vargas / Plan: Emily Ackerman HMO / Product Type: HMO /    In time:3:30  Out time:4:15  Total Treatment Time (min): 45  Total Timed Codes (min): 45  1:1 Treatment Time (MC only): NA   Visit #: 4 of 12    Treatment Area: Unspecified abnormalities of gait and mobility [R26.9]    SUBJECTIVE  Pain Level (0-10 scale): 3/10  Any medication changes, allergies to medications, adverse drug reactions, diagnosis change, or new procedure performed?: [x] No    [] Yes (see summary sheet for update)  Subjective functional status/changes:   [] No changes reported  \"I have a little  Pain today. \"    OBJECTIVE      45 min Therapeutic Exercise:  [x] See flow sheet :   Rationale: increase ROM, increase strength and improve coordination to improve the patients ability to return to prior level of physical activity. With   [] TE   [] TA   [] neuro   [] other: Patient Education: [x] Review HEP    [] Progressed/Changed HEP based on:   [] positioning   [] body mechanics   [] transfers   [] heat/ice application    [] other:      Other Objective/Functional Measures:      Pain Level (0-10 scale) post treatment: 0/10    ASSESSMENT/Changes in Function: Pt requested stair amb to determine ability to enter and exit pool for upcoming weekend. PTA as well as Evaluating therapist advised against attempt due to lack of experience with stair amb in therapy. Pt deemed not safe upon performing basic step ups in //. Pt reported greatly increased fatigue post tx.      Patient will continue to benefit from skilled PT services to modify and progress therapeutic interventions, address functional mobility deficits, address ROM deficits, address strength deficits, analyze and address soft tissue restrictions, analyze and cue movement patterns, analyze and modify body mechanics/ergonomics and assess and modify postural abnormalities to attain remaining goals. []  See Plan of Care  []  See progress note/recertification  []  See Discharge Summary         Progress towards goals / Updated goals:  Short Term Goals: To be accomplished in 3 weeks:  1. Patient will be compliant with HEP to progress toward goals and restore functional mobility. Eval Status: issued this visit  Current: Pt reports very little compliance with HEP 5/24/19     2. Patient will improve FOTO score by 7 points to improve functional tolerance for exercise. Eval Status:FOTO 45     3. Pt will tolerate 10 minutes of standing therex during therapy session without seated rest break. Eval Status: pt able to tolerate 1 standing exercise prior to needing seated rest break  Current: Completed 5 minutes Progressing     Long Term Goals: To be accomplished in 6 weeks:  1. Patient will be independent with HEP to progress toward goals of prolonged ambulation with LRAD. Eval Status: issued at [facility]     2. Patient will improve FOTO score by 13 points to improve functional tolerance for prolonged ambulation of LRAD. Eval Status: FOTO 45        3. Patient will have 4+/5 bilateral LE strength to return to goals of prolonged ambulation.   Eval Status:   Hip L (1-5) R (1-5)   Hip Flexion 3+/5 3+/5   Hip Ext       Hip ABD 4/5 4/5   Hip ADD 4/5 4/5   Hip ER 3+/5 3+/5   Hip IR 3+/5 3+/5      Knee L (1-5) R (1-5)   Knee Flexion 3+/5 3+/5   Knee Extension 3+/5 3+/5   Ankle PF       Ankle DF       Other                     PLAN  [x]  Upgrade activities as tolerated     [x]  Continue plan of care  []  Update interventions per flow sheet       []  Discharge due to:_  []  Other:_      Vivien Spivey [facility] 5/24/2019  4:41 PM    Future Appointments   Date Time Provider Gurpreet Kevin   5/29/2019  6:00 PM Jesus Rojas [facility]   5/30/2019  5:30 PM Gerre Najjar, [facility]   6/4/2019  5:30 PM Camille Zimmer [facility]   6/5/2019  5:30 PM John Cortes, [facility]   6/10/2019  5:00 PM [facility]   6/12/2019  5:00 PM [facility]   6/17/2019  5:00 PM [facility]   6/19/2019  5:30 PM Gerre Najjar, PT [facility]

## 2019-05-29 ENCOUNTER — HOSPITAL ENCOUNTER (OUTPATIENT)
Dept: PHYSICAL THERAPY | Age: 39
Discharge: HOME | End: 2019-05-29
Payer: COMMERCIAL

## 2019-05-29 PROCEDURE — 97110 THERAPEUTIC EXERCISES: CPT

## 2019-05-29 NOTE — PROGRESS NOTES
PT DAILY TREATMENT NOTE    Patient Name: Marlon Salgado  Date:2019  : 1980  [x]  Patient  Verified  Payor: Tristan Delcid / Plan: [id number] HMO / Product Type: HMO /    In time:5:59  Out time:6:38  Total Treatment Time (min): 39  Total Timed Codes (min): 39  1:1 Treatment Time (MC only): NA   Visit #: 5 of 12    Treatment Area: Unspecified abnormalities of gait and mobility [R26.9]    SUBJECTIVE  Pain Level (0-10 scale): 0/10  Any medication changes, allergies to medications, adverse drug reactions, diagnosis change, or new procedure performed?: [x] No    [] Yes (see summary sheet for update)  Subjective functional status/changes:   [] No changes reported  \"I don't have any pain. I got in the pool over the weekend and I was able to get out the first time. After the second time I went down because my legs gave out and needed help to get back up. I didn't hurt myself at all. \"    OBJECTIVE    39 min Therapeutic Exercise:  [x] See flow sheet :   Rationale: increase ROM, increase strength and improve coordination to improve the patients ability to return to prior level of physical activity. With   [] TE   [] TA   [] neuro   [] other: Patient Education: [x] Review HEP    [] Progressed/Changed HEP based on:   [] positioning   [] body mechanics   [] transfers   [] heat/ice application    [] other:      Other Objective/Functional Measures: FOTO: 45     Pain Level (0-10 scale) post treatment: 0/10    ASSESSMENT/Changes in Function: Pt tolerated session well. Pt continues to progress slowly with tolerance to activity. Pt reported increased fatigue post tx. Pt demonstrated no adverse affects post tx.      Patient will continue to benefit from skilled PT services to modify and progress therapeutic interventions, address functional mobility deficits, address ROM deficits, address strength deficits, analyze and address soft tissue restrictions, analyze and cue movement patterns, analyze and modify body mechanics/ergonomics and assess and modify postural abnormalities to attain remaining goals. []  See Plan of Care  []  See progress note/recertification  []  See Discharge Summary         Progress towards goals / Updated goals:  Short Term Goals: To be accomplished in 3 weeks:  1. Patient will be compliant with HEP to progress toward goals and restore functional mobility. Eval Status: issued this visit  Current: Pt reports very little compliance with HEP 5/24/19     2. Patient will improve FOTO score by 7 points to improve functional tolerance for exercise. Eval Status:FOTO 45     3. Pt will tolerate 10 minutes of standing therex during therapy session without seated rest break. Eval Status: pt able to tolerate 1 standing exercise prior to needing seated rest break  Current: Completed 5 minutes Progressing     Long Term Goals: To be accomplished in 6 weeks:  1. Patient will be independent with HEP to progress toward goals of prolonged ambulation with LRAD. Eval Status: issued at Eval     2. Patient will improve FOTO score by 13 points to improve functional tolerance for prolonged ambulation of LRAD. Eval Status: FOTO 45  Current: 45 5/29/19        3. Patient will have 4+/5 bilateral LE strength to return to goals of prolonged ambulation.   Eval Status:   Hip L (1-5) R (1-5)   Hip Flexion 3+/5 3+/5   Hip Ext       Hip ABD 4/5 4/5   Hip ADD 4/5 4/5   Hip ER 3+/5 3+/5   Hip IR 3+/5 3+/5      Knee L (1-5) R (1-5)   Knee Flexion 3+/5 3+/5   Knee Extension 3+/5 3+/5   Ankle PF       Ankle DF       Other              Current: Hip AROM: 4-/5 to 4/5, Knee ROM 4-/5 at beginning of tx 5/29/19          PLAN  [x]  Upgrade activities as tolerated     [x]  Continue plan of care  []  Update interventions per flow sheet       []  Discharge due to:_  []  Other:_      Zuleima Cornejo, PTA 5/29/2019  6:37 PM    Future Appointments   Date Time Provider Gurpreet Kevin   5/30/2019  5:30 PM Lianne Waters, PT [facility] 6/4/2019  5:30 PM Chelsie Draper, PTA [address] [facility]   6/5/2019  5:30 PM Bradd Long [address] [facility]   6/10/2019  5:00 PM Bradd Long [address] [facility]   6/12/2019  5:00 PM Bradd Long [address] [facility]   6/17/2019  5:00 PM Bradd Long [address] [facility]   6/19/2019  5:30 PM Bradd Long [address] [facility]

## 2019-05-30 ENCOUNTER — APPOINTMENT (OUTPATIENT)
Dept: PHYSICAL THERAPY | Age: 39
End: 2019-05-30
Payer: COMMERCIAL

## 2019-06-03 ENCOUNTER — HOSPITAL ENCOUNTER (OUTPATIENT)
Dept: LAB | Age: 39
Discharge: HOME | End: 2019-06-03
Attending: INTERNAL MEDICINE
Payer: COMMERCIAL

## 2019-06-03 LAB
ALBUMIN SERPL-MCNC: 2.6 G/DL (ref 3.4–5)
ALBUMIN/GLOB SERPL: 0.7 {RATIO} (ref 0.8–1.7)
ALP SERPL-CCNC: 72 U/L (ref 45–117)
ALT SERPL-CCNC: 21 U/L (ref 16–61)
ANION GAP SERPL CALC-SCNC: 9 MMOL/L (ref 3–18)
AST SERPL-CCNC: 26 U/L (ref 15–37)
BASOPHILS # BLD: 0 K/UL (ref 0–0.1)
BASOPHILS NFR BLD: 0 % (ref 0–2)
BILIRUB SERPL-MCNC: 0.3 MG/DL (ref 0.2–1)
BUN SERPL-MCNC: 13 MG/DL (ref 7–18)
BUN/CREAT SERPL: 8 (ref 12–20)
CALCIUM SERPL-MCNC: 8.9 MG/DL (ref 8.5–10.1)
CHLORIDE SERPL-SCNC: 108 MMOL/L (ref 100–108)
CO2 SERPL-SCNC: 26 MMOL/L (ref 21–32)
CREAT SERPL-MCNC: 1.56 MG/DL (ref 0.6–1.3)
CREAT UR-MCNC: 82 MG/DL (ref 30–125)
CREAT UR-MCNC: 84 MG/DL (ref 30–125)
DIFFERENTIAL METHOD BLD: ABNORMAL
EOSINOPHIL # BLD: 0.3 K/UL (ref 0–0.4)
EOSINOPHIL NFR BLD: 3 % (ref 0–5)
ERYTHROCYTE [DISTWIDTH] IN BLOOD BY AUTOMATED COUNT: 13.7 % (ref 11.6–14.5)
GLOBULIN SER CALC-MCNC: 3.8 G/DL (ref 2–4)
GLUCOSE SERPL-MCNC: 98 MG/DL (ref 74–99)
HCT VFR BLD AUTO: 30.3 % (ref 36–48)
HGB BLD-MCNC: 9.4 G/DL (ref 13–16)
LYMPHOCYTES # BLD: 1.6 K/UL (ref 0.9–3.6)
LYMPHOCYTES NFR BLD: 16 % (ref 21–52)
MAGNESIUM SERPL-MCNC: 1.6 MG/DL (ref 1.6–2.6)
MCH RBC QN AUTO: 27.7 PG (ref 24–34)
MCHC RBC AUTO-ENTMCNC: 31 G/DL (ref 31–37)
MCV RBC AUTO: 89.4 FL (ref 74–97)
MICROALBUMIN UR-MCNC: 126 MG/DL (ref 0–3)
MICROALBUMIN/CREAT UR-RTO: 1500 MG/G (ref 0–30)
MONOCYTES # BLD: 0.7 K/UL (ref 0.05–1.2)
MONOCYTES NFR BLD: 7 % (ref 3–10)
NEUTS SEG # BLD: 7.3 K/UL (ref 1.8–8)
NEUTS SEG NFR BLD: 74 % (ref 40–73)
PHOSPHATE SERPL-MCNC: 5.8 MG/DL (ref 2.5–4.9)
PLATELET # BLD AUTO: 408 K/UL (ref 135–420)
PMV BLD AUTO: 8.4 FL (ref 9.2–11.8)
POTASSIUM SERPL-SCNC: 3.8 MMOL/L (ref 3.5–5.5)
PROT SERPL-MCNC: 6.4 G/DL (ref 6.4–8.2)
PROT UR-MCNC: 183 MG/DL
PROT/CREAT UR-RTO: 2.2
RBC # BLD AUTO: 3.39 M/UL (ref 4.7–5.5)
SODIUM SERPL-SCNC: 143 MMOL/L (ref 136–145)
WBC # BLD AUTO: 9.9 K/UL (ref 4.6–13.2)

## 2019-06-03 PROCEDURE — 80053 COMPREHEN METABOLIC PANEL: CPT

## 2019-06-03 PROCEDURE — 83735 ASSAY OF MAGNESIUM: CPT

## 2019-06-03 PROCEDURE — 84156 ASSAY OF PROTEIN URINE: CPT

## 2019-06-03 PROCEDURE — 36415 COLL VENOUS BLD VENIPUNCTURE: CPT

## 2019-06-03 PROCEDURE — 84100 ASSAY OF PHOSPHORUS: CPT

## 2019-06-03 PROCEDURE — 82043 UR ALBUMIN QUANTITATIVE: CPT

## 2019-06-03 PROCEDURE — 85025 COMPLETE CBC W/AUTO DIFF WBC: CPT

## 2019-06-04 ENCOUNTER — HOSPITAL ENCOUNTER (OUTPATIENT)
Dept: PHYSICAL THERAPY | Age: 39
Discharge: HOME | End: 2019-06-04
Payer: COMMERCIAL

## 2019-06-04 LAB
FAX TO INFO,FAXT: NORMAL
FAX TO NUMBER,FAXN: NORMAL

## 2019-06-04 NOTE — PROGRESS NOTES
PT DAILY TREATMENT NOTE    Patient Name: Nevaeh Salgado  Date:2019  : 1980  [x]  Patient  Verified  Payor: Marcel Beard / Plan: [id number] HMO / Product Type: HMO /    In time:***  Out time:***  Total Treatment Time (min): ***  Total Timed Codes (min): ***  1:1 Treatment Time ( only): ***   Visit #: *** of ***    Treatment Area: Unspecified abnormalities of gait and mobility [R26.9]    SUBJECTIVE  Pain Level (0-10 scale): ***  Any medication changes, allergies to medications, adverse drug reactions, diagnosis change, or new procedure performed?: [x] No    [] Yes (see summary sheet for update)  Subjective functional status/changes:   [] No changes reported  ***    OBJECTIVE    Modalities Rationale:     {[facility] INMOTION MODALITIES:54159} to improve patient's ability to ***   min [] Estim, type/location:                                      []  att     []  unatt     []  w/US     []  w/ice    []  w/heat    min []  Mechanical Traction: type/lbs                   []  pro   []  sup   []  int   []  cont    []  before manual    []  after manual    min []  Ultrasound, settings/location:      min []  Iontophoresis w/ dexamethasone, location:                                               []  take home patch       []  in clinic    min []  Ice     []  Heat    location/position:     min []  Vasopneumatic Device, press/temp:     min []  Other:    [] Skin assessment post-treatment (if applicable):    []  intact    []  redness- no adverse reaction     []redness  adverse reaction:        *** min []Eval                  []Re-Eval       *** min Therapeutic Exercise:  [] See flow sheet :   Rationale: {[facility] IMMOTION THER EX:03846} to improve the patients ability to ***    *** min Therapeutic Activity:  []  See flow sheet :   Rationale: {[facility] IMMOTION THER EX:78960}  to improve the patients ability to ***     *** min Neuromuscular Re-education:  []  See flow sheet :   Rationale: {[facility] IMMOTION THER EX:32898}  to improve the patients ability to ***    *** min Manual Therapy:  ***   Rationale: {[facility] IMMOTION MANUAL THERAPY:07918} to improve the patients ability to ***     min Gait Training:  ___ feet with ___ device on level surfaces with ___ level of assistance   Rationale: To improve ambulation safety and efficiency in order to improve patient's ability to safely ambulate at home for self care. min Self Care:    Rationale:    {[facility] IMMOTION THER EX:51707} to improve the patients ability to ***          With   [] TE   [] TA   [] neuro   [] other: Patient Education: [x] Review HEP    [] Progressed/Changed HEP based on:   [] positioning   [] body mechanics   [] transfers   [] heat/ice application    [] other:      Other Objective/Functional Measures: ***     Pain Level (0-10 scale) post treatment: ***    ASSESSMENT/Changes in Function: ***    Patient will continue to benefit from skilled PT services to {[facility] INMOTION ASSESSMENT STATEMENTS:20159} to attain remaining goals. []  See Plan of Care  []  See progress note/recertification  []  See Discharge Summary         Progress towards goals / Updated goals:  Short Term Goals: To be accomplished in 3 weeks:  1. Patient will be compliant with HEP to progress toward goals and restore functional mobility. Eval Status: issued this visit  Current: Pt reports very little compliance with HEP 5/24/19     2. Patient will improve FOTO score by 7 points to improve functional tolerance for exercise. Eval Status:FOTO 45     3. Pt will tolerate 10 minutes of standing therex during therapy session without seated rest break. Eval Status: pt able to tolerate 1 standing exercise prior to needing seated rest break  Current: Completed 5 minutes Progressing     Long Term Goals: To be accomplished in 6 weeks:  1. Patient will be independent with HEP to progress toward goals of prolonged ambulation with LRAD. Eval Status: issued at Eval     2.  Patient will improve FOTO score by 13 points to improve functional tolerance for prolonged ambulation of LRAD. Eval Status: FOTO 45  Current: 45 5/29/19        3. Patient will have 4+/5 bilateral LE strength to return to goals of prolonged ambulation.   Eval Status:   Hip L (1-5) R (1-5)   Hip Flexion 3+/5 3+/5   Hip Ext       Hip ABD 4/5 4/5   Hip ADD 4/5 4/5   Hip ER 3+/5 3+/5   Hip IR 3+/5 3+/5      Knee L (1-5) R (1-5)   Knee Flexion 3+/5 3+/5   Knee Extension 3+/5 3+/5   Ankle PF       Ankle DF       Other                        PLAN  []  Upgrade activities as tolerated     []  Continue plan of care  []  Update interventions per flow sheet       []  Discharge due to:_  []  Other:_      Melita Gray, DWAYNE 6/4/2019  4:27 PM    Future Appointments   Date Time Provider Gurpreet Kevin   6/4/2019  5:30 PM R[facility]   6/5/2019  5:30 PM [facility]   6/10/2019  5:00 PM [facility]   6/12/2019  5:00 PM [facility]   6/17/2019  5:00 PM [facility]   6/19/2019  5:30 PM [facility]

## 2019-06-05 ENCOUNTER — HOSPITAL ENCOUNTER (OUTPATIENT)
Dept: PHYSICAL THERAPY | Age: 39
Discharge: HOME | End: 2019-06-05
Payer: COMMERCIAL

## 2019-06-05 ENCOUNTER — APPOINTMENT (OUTPATIENT)
Dept: PHYSICAL THERAPY | Age: 39
End: 2019-06-05
Payer: COMMERCIAL

## 2019-06-05 PROCEDURE — 97110 THERAPEUTIC EXERCISES: CPT

## 2019-06-05 NOTE — PROGRESS NOTES
PT DAILY TREATMENT NOTE    Patient Name: Claribel Pang  Date:2019  : 1980  [x]  Patient  Verified  Payor: Jaquelin Vargas / Plan: Emily Ackerman HMO / Product Type: HMO /    In time:5:37  Out time:6:18  Total Treatment Time (min): 41  Total Timed Codes (min): 41  1:1 Treatment Time (MC only): NA   Visit #: 6 of 12    Treatment Area: Unspecified abnormalities of gait and mobility [R26.9]    SUBJECTIVE  Pain Level (0-10 scale): 4/10  Any medication changes, allergies to medications, adverse drug reactions, diagnosis change, or new procedure performed?: [x] No    [] Yes (see summary sheet for update)  Subjective functional status/changes:   [] No changes reported  \"I have some pain from my arthritis today. \"    OBJECTIVE      41 min Therapeutic Exercise:  [x] See flow sheet :   Rationale: increase ROM, increase strength and improve coordination to improve the patients ability to return to prior level of physical activity. With   [] TE   [] TA   [] neuro   [] other: Patient Education: [x] Review HEP    [] Progressed/Changed HEP based on:   [] positioning   [] body mechanics   [] transfers   [] heat/ice application    [] other:      Other Objective/Functional Measures:      Pain Level (0-10 scale) post treatment: 4/10    ASSESSMENT/Changes in Function: Pt tolerated ex well with no increased pain. Pt continues to progress with exercise tolerance and progression to increased difficulty. Pt reported no increased post tx. Patient will continue to benefit from skilled PT services to modify and progress therapeutic interventions, address functional mobility deficits, address ROM deficits, address strength deficits, analyze and address soft tissue restrictions, analyze and cue movement patterns, analyze and modify body mechanics/ergonomics and assess and modify postural abnormalities to attain remaining goals.      []  See Plan of Care  []  See progress note/recertification  []  See Discharge Summary         Progress towards goals / Updated goals:  Short Term Goals: To be accomplished in 3 weeks:  1. Patient will be compliant with HEP to progress toward goals and restore functional mobility. Eval Status: issued this visit  Current: Pt reports very little compliance with HEP 5/24/19     2. Patient will improve FOTO score by 7 points to improve functional tolerance for exercise. Eval Status:FOTO 45  Current: 49 5/13/19     3. Pt will tolerate 10 minutes of standing therex during therapy session without seated rest break. Eval Status: pt able to tolerate 1 standing exercise prior to needing seated rest break  Current: pt tolerated 5 minute Nustep warm up without stopping 6/5/19     Long Term Goals: To be accomplished in 6 weeks:  1. Patient will be independent with HEP to progress toward goals of prolonged ambulation with LRAD. Eval Status: issued at Eval     2. Patient will improve FOTO score by 13 points to improve functional tolerance for prolonged ambulation of LRAD. Eval Status: FOTO 45  Current: 45 5/29/19        3. Patient will have 4+/5 bilateral LE strength to return to goals of prolonged ambulation.   Eval Status:   Hip L (1-5) R (1-5)   Hip Flexion 3+/5 3+/5   Hip Ext       Hip ABD 4/5 4/5   Hip ADD 4/5 4/5   Hip ER 3+/5 3+/5   Hip IR 3+/5 3+/5      Knee L (1-5) R (1-5)   Knee Flexion 3+/5 3+/5   Knee Extension 3+/5 3+/5   Ankle PF       Ankle DF       Other              Current: Hip AROM: 4-/5 to 4/5, Knee ROM 4-/5 at beginning of tx 5/29/19             PLAN  [x]  Upgrade activities as tolerated     [x]  Continue plan of care  []  Update interventions per flow sheet       []  Discharge due to:_  []  Other:_      Elidia Armenta, DWAYNE 6/5/2019  5:43 PM    Future Appointments   Date Time Provider Gurpreet Kevin   6/10/2019  5:00 PM Cheron Shouts [address] [facility]   6/12/2019  5:00 PM Cheron Shouts [address] [facility]   6/17/2019  5:00 PM Cheron Shouts [address] [facility]   6/19/2019  5:30 PM Guzman Bacon, PTA [facility]

## 2019-06-10 ENCOUNTER — APPOINTMENT (OUTPATIENT)
Dept: PHYSICAL THERAPY | Age: 39
End: 2019-06-10
Payer: COMMERCIAL

## 2019-06-12 ENCOUNTER — APPOINTMENT (OUTPATIENT)
Dept: PHYSICAL THERAPY | Age: 39
End: 2019-06-12
Payer: COMMERCIAL

## 2019-06-17 ENCOUNTER — HOSPITAL ENCOUNTER (OUTPATIENT)
Dept: PHYSICAL THERAPY | Age: 39
Discharge: HOME | End: 2019-06-17
Payer: COMMERCIAL

## 2019-06-17 ENCOUNTER — APPOINTMENT (OUTPATIENT)
Dept: PHYSICAL THERAPY | Age: 39
End: 2019-06-17
Payer: COMMERCIAL

## 2019-06-17 PROCEDURE — 97110 THERAPEUTIC EXERCISES: CPT

## 2019-06-17 NOTE — PROGRESS NOTES
PT DAILY TREATMENT NOTE    Patient Name: Irwin Winters  Date:2019  : 1980  [x]  Patient  Verified  Payor: Bhupendra Montemayor / Plan: Sang David HMO / Product Type: HMO /    In time:4:50 Out time: 5:28  Total Treatment Time (min): 38  Total Timed Codes (min): NA  1:1 Treatment Time (MC only): NA   Visit #: 3 of 12    Treatment Area: Unspecified abnormalities of gait and mobility [R26.9]    SUBJECTIVE  Pain Level (0-10 scale): 0/10  Any medication changes, allergies to medications, adverse drug reactions, diagnosis change, or new procedure performed?: [x] No    [] Yes (see summary sheet for update)  Subjective functional status/changes:   [] No changes reported  \"I don/t have any pain right now. I feel alright. \"    OBJECTIVE      38 min Therapeutic Exercise:  [x] See flow sheet :   Rationale: increase ROM, increase strength and improve coordination to improve the patients ability to return to prior level of physical activity. With   [] TE   [] TA   [] neuro   [] other: Patient Education: [x] Review HEP    [] Progressed/Changed HEP based on:   [] positioning   [] body mechanics   [] transfers   [] heat/ice application    [] other:      Other Objective/Functional Measures:      Pain Level (0-10 scale) post treatment: 0/10    ASSESSMENT/Changes in Function: Pt tolerated session well. Pt continues to show great improvements with stamina and activity tolerance. Pt continues to require rest breaks but are decreased in frequency since eval.    Patient will continue to benefit from skilled PT services to modify and progress therapeutic interventions, address functional mobility deficits, address ROM deficits, address strength deficits, analyze and address soft tissue restrictions, analyze and cue movement patterns, analyze and modify body mechanics/ergonomics and assess and modify postural abnormalities to attain remaining goals.      []  See Plan of Care  []  See progress note/recertification  []  See Discharge Summary         Progress towards goals / Updated goals:  Short Term Goals: To be accomplished in 3 weeks:  1. Patient will be compliant with HEP to progress toward goals and restore functional mobility. Eval Status: issued this visit  Current: Pt reports very little compliance with HEP 5/24/19     2. Patient will improve FOTO score by 7 points to improve functional tolerance for exercise. Eval Status:FOTO 45  Current: 49 5/13/19     3. Pt will tolerate 10 minutes of standing therex during therapy session without seated rest break. Eval Status: pt able to tolerate 1 standing exercise prior to needing seated rest break  Current: pt tolerated 5 minute Nustep warm up without stopping 6/5/19     Long Term Goals: To be accomplished in 6 weeks:  1. Patient will be independent with HEP to progress toward goals of prolonged ambulation with LRAD. Eval Status: issued at Eval  Current: Pt reports 3-5 days/week compliance 6/17/19     2. Patient will improve FOTO score by 13 points to improve functional tolerance for prolonged ambulation of LRAD. Eval Status: FOTO 45  Current: 45 5/29/19        3. Patient will have 4+/5 bilateral LE strength to return to goals of prolonged ambulation.   Eval Status:   Hip L (1-5) R (1-5)   Hip Flexion 3+/5 3+/5   Hip Ext       Hip ABD 4/5 4/5   Hip ADD 4/5 4/5   Hip ER 3+/5 3+/5   Hip IR 3+/5 3+/5      Knee L (1-5) R (1-5)   Knee Flexion 3+/5 3+/5   Knee Extension 3+/5 3+/5   Ankle PF       Ankle DF       Other              Current: Hip AROM: 4-/5 to 4/5, Knee ROM 4-/5 at beginning of tx 5/29/19             PLAN  [x]  Upgrade activities as tolerated     [x]  Continue plan of care  []  Update interventions per flow sheet       []  Discharge due to:_  []  Other:_      Shirlean Babinski, PTA 6/17/2019  5:38 PM    Future Appointments   Date Time Provider Gurpreet Kevin   6/19/2019  5:30 PM [facility]

## 2019-06-19 ENCOUNTER — APPOINTMENT (OUTPATIENT)
Dept: PHYSICAL THERAPY | Age: 39
End: 2019-06-19
Payer: COMMERCIAL

## 2019-06-19 ENCOUNTER — HOSPITAL ENCOUNTER (OUTPATIENT)
Dept: PHYSICAL THERAPY | Age: 39
Discharge: HOME | End: 2019-06-19
Payer: COMMERCIAL

## 2019-06-19 PROCEDURE — 97110 THERAPEUTIC EXERCISES: CPT

## 2019-06-19 NOTE — PROGRESS NOTES
PT DAILY TREATMENT NOTE    Patient Name: Thien Holman  Date:2019  : 1980  [x]  Patient  Verified  Payor: Kylie Koenig / Plan: Asiya Zabala HMO / Product Type: HMO /    In time:5:32  Out time:6:10  Total Treatment Time (min): 38  Total Timed Codes (min): 38  1:1 Treatment Time ([address] only): 45   Visit #: 8 of 12    Treatment Area: Unspecified abnormalities of gait and mobility [R26.9]    SUBJECTIVE  Pain Level (0-10 scale): 4/10  Any medication changes, allergies to medications, adverse drug reactions, diagnosis change, or new procedure performed?: [x] No    [] Yes (see summary sheet for update)  Subjective functional status/changes:   [] No changes reported  \"I just have some pain from arthritis. \"    OBJECTIVE      38 min Therapeutic Exercise:  [x] See flow sheet :   Rationale: increase ROM, increase strength and improve coordination to improve the patients ability to return to prior level of physical activity. With   [] TE   [] TA   [] neuro   [] other: Patient Education: [x] Review HEP    [] Progressed/Changed HEP based on:   [] positioning   [] body mechanics   [] transfers   [] heat/ice application    [] other:      Other Objective/Functional Measures:      Pain Level (0-10 scale) post treatment: 0/10    ASSESSMENT/Changes in Function: Pt arrived reported mild fatigue and slight pain. Pt able to tolerate standing ex moderately well but required many rest breaks. Pt gradually showed decreased instance of exercise tolerance before requiring another rest break. PTA noticed pt very fatigue with reports of mild lightheadedness. PTA assessed vitals and BP was 104/79 which was a lower systolic for patient by 11~XSXN. Pt terminated tx due to heavy fatigue.      Patient will continue to benefit from skilled PT services to modify and progress therapeutic interventions, address functional mobility deficits, address ROM deficits, address strength deficits, analyze and address soft tissue restrictions, analyze and cue movement patterns, analyze and modify body mechanics/ergonomics and assess and modify postural abnormalities to attain remaining goals. []  See Plan of Care  []  See progress note/recertification  []  See Discharge Summary         Progress towards goals / Updated goals:  Short Term Goals: To be accomplished in 3 weeks:  1. Patient will be compliant with HEP to progress toward goals and restore functional mobility. Eval Status: issued this visit  Current: Pt reports very little compliance with HEP 5/24/19     2. Patient will improve FOTO score by 7 points to improve functional tolerance for exercise. Eval Status:FOTO 45  Current: 49 5/13/19     3. Pt will tolerate 10 minutes of standing therex during therapy session without seated rest break. Eval Status: pt able to tolerate 1 standing exercise prior to needing seated rest break  Current: pt tolerated 5 minute Nustep warm up without stopping 6/5/19     Long Term Goals: To be accomplished in 6 weeks:  1. Patient will be independent with HEP to progress toward goals of prolonged ambulation with LRAD. Eval Status: issued at Eval  Current: Pt reports 3-5 days/week compliance 6/17/19     2. Patient will improve FOTO score by 13 points to improve functional tolerance for prolonged ambulation of LRAD. Eval Status: FOTO 45  Current: 45 5/29/19        3. Patient will have 4+/5 bilateral LE strength to return to goals of prolonged ambulation.   Eval Status:   Hip L (1-5) R (1-5)   Hip Flexion 3+/5 3+/5   Hip Ext       Hip ABD 4/5 4/5   Hip ADD 4/5 4/5   Hip ER 3+/5 3+/5   Hip IR 3+/5 3+/5      Knee L (1-5) R (1-5)   Knee Flexion 3+/5 3+/5   Knee Extension 3+/5 3+/5   Ankle PF       Ankle DF       Other              Current: Hip AROM: 4-/5 to 4/5, Knee ROM 4-/5 at beginning of tx 5/29/19                    PLAN  [x]  Upgrade activities as tolerated     [x]  Continue plan of care  []  Update interventions per flow sheet       []  Discharge due to:_  []  Other:_ Konrad Neff, DWAYNE 6/19/2019  5:38 PM    No future appointments.

## 2019-06-25 ENCOUNTER — HOSPITAL ENCOUNTER (OUTPATIENT)
Dept: PHYSICAL THERAPY | Age: 39
Discharge: HOME | End: 2019-06-25
Payer: COMMERCIAL

## 2019-06-25 PROCEDURE — 97110 THERAPEUTIC EXERCISES: CPT

## 2019-06-25 NOTE — PROGRESS NOTES
PT DAILY TREATMENT NOTE    Patient Name: Stephania Brown  Date:2019  : 1980  [x]  Patient  Verified  Payor: Man Mcnair / Plan: [employer] HMO / Product Type: HMO /    In time:537  Out time:617  Total Treatment Time (min): 40  Total Timed Codes (min): 40  1:1 Treatment Time (MC only): 40   Visit #: 9 of 12    Treatment Area: Unspecified abnormalities of gait and mobility [R26.9]    SUBJECTIVE  Pain Level (0-10 scale): 3/10  Any medication changes, allergies to medications, adverse drug reactions, diagnosis change, or new procedure performed?: [x] No    [] Yes (see summary sheet for update)  Subjective functional status/changes:   [] No changes reported  Pt reports that he is tired today. but he thinks that he can do things more easily without getting tired. He states that he can get in and out of the car and get in and out of bed with improved ease    OBJECTIVE      40 min Therapeutic Exercise:  [] See flow sheet :   Rationale: increase ROM, increase strength, improve coordination, improve balance and increase proprioception to improve the patients ability to ambulate with LRAD safely through the community          With   [x] TE   [] TA   [] neuro   [] other: Patient Education: [x] Review HEP    [] Progressed/Changed HEP based on:   [x] positioning   [x] body mechanics   [] transfers   [] heat/ice application    [] other:      Other Objective/Functional Measures:      Pain Level (0-10 scale) post treatment: 0/10    ASSESSMENT/Changes in Function: Pt demonstrates increasing strength and endurance for functional activities leading to increased independence in the community despite only partial compliance with HEP and some inconsistency with attendance.  Pt will benefit from continued treatment to address continued endurance challenges    Patient will continue to benefit from skilled PT services to address functional mobility deficits, address ROM deficits, address strength deficits, analyze and address soft tissue restrictions, analyze and cue movement patterns, analyze and modify body mechanics/ergonomics, assess and modify postural abnormalities, address imbalance/dizziness and instruct in home and community integration to attain remaining goals. []  See Plan of Care  []  See progress note/recertification  []  See Discharge Summary         Progress towards goals / Updated goals:  Short Term Goals: To be accomplished in 3 weeks:  1. Patient will be compliant with HEP to progress toward goals and restore functional mobility. Eval Status: issued this visit  Current: Pt reports completing every other day IMPROVING 6-25-19     2. Patient will improve FOTO score by 7 points to improve functional tolerance for exercise. Eval Status:FOTO 45  Current: 48 SLIGHT IMPROVEMENT 6-25-19     3. Pt will tolerate 10 minutes of standing therex during therapy session without seated rest break. Eval Status: pt able to tolerate 1 standing exercise prior to needing seated rest break  Current: pt tolerated 5 minutes of standing therex before sitting IMPROVING 6-25-19     Long Term Goals: To be accomplished in 6 weeks:  1. Patient will be independent with HEP to progress toward goals of prolonged ambulation with LRAD. Eval Status: issued at Eval  Current:Pt ambulates with SPC (to back of [employer] and back to car) 6-25-19 IMPROVING     2. Patient will improve FOTO score by 13 points to improve functional tolerance for prolonged ambulation of LRAD. Eval Status: FOTO 45  Current: 48 SLIGHT IMPROVEMENT 6-25-19        3. Patient will have 4+/5 bilateral LE strength to return to goals of prolonged ambulation.   Eval Status:   Hip L (1-5) R (1-5)   Hip Flexion 3+/5 3+/5   Hip Ext       Hip ABD 4/5 4/5   Hip ADD 4/5 4/5   Hip ER 3+/5 3+/5   Hip IR 3+/5 3+/5      Knee L (1-5) R (1-5)   Knee Flexion 3+/5 3+/5   Knee Extension 3+/5 3+/5   Ankle PF       Ankle DF       Other              Current: Hip AROM: 4/5 for all IMPROVING 6-25-19                      PLAN  []  Upgrade activities as tolerated     []  Continue plan of care  []  Update interventions per flow sheet       []  Discharge due to:_  []  Other:_      Rashida Regan PTA 6/25/2019  5:42 PM    Future Appointments   Date Time Provider Gurpreet Kevin   6/27/2019  5:30 PM Liudmila Hinkle, PT [facility]   7/1/2019  4:30 PM Coye Score [facility]   7/5/2019  1:30 PM Diamond Franco PTA [facility]   7/8/2019  5:30 PM Leatha Berger PT [facility]   7/10/2019  5:30 PM Coye Score [facility]   7/15/2019  5:30 PM Coye Score [facility]

## 2019-06-27 ENCOUNTER — HOSPITAL ENCOUNTER (OUTPATIENT)
Dept: PHYSICAL THERAPY | Age: 39
Discharge: HOME | End: 2019-06-27
Payer: COMMERCIAL

## 2019-06-27 PROCEDURE — 97530 THERAPEUTIC ACTIVITIES: CPT

## 2019-06-27 PROCEDURE — 97110 THERAPEUTIC EXERCISES: CPT

## 2019-06-27 NOTE — PROGRESS NOTES
PT DAILY TREATMENT NOTE    Patient Name: Reza Daniel  Date:2019  : 1980  [x]  Patient  Verified  Payor: Shelly Section / Plan: [id number] HMO / Product Type: HMO /    In time:528  Out time:620  Total Treatment Time (min): 52  Total Timed Codes (min): 52  1:1 Treatment Time ( [address] only): 46   Visit #: 10 of 12 + 8    Treatment Area: Unspecified abnormalities of gait and mobility [R26.9]    SUBJECTIVE  Pain Level (0-10 scale): 3/10  Any medication changes, allergies to medications, adverse drug reactions, diagnosis change, or new procedure performed?: [x] No    [] Yes (see summary sheet for update)  Subjective functional status/changes:   [] No changes reported  Feeling exhausted. Feeling worse. Feels that he has made a lot of progress. Improved balance and walking tolerance. Mostly walking inside home. Reports difficulty with transfers from floor to standing, getting out of chairs, general balance and stamina. Worked as a  until about 1 year ago but was unable to keep up with his schedule due to deterioration of health  No recent falls. Reports to be afraid of falling. \"That is why I don't walk outside. \"  Next MD visit 19    OBJECTIVE      25 min Therapeutic Exercise:  [x] See flow sheet :   Rationale: increase ROM, increase strength, improve coordination, improve balance and increase proprioception to improve the patients ability to ambulate with LRAD safely through the community    27 min therapeutic Activity: reevaluation, balance activities, updated current activities to increase ambulatory/standing tolerance, monitored BP at end of session due to patient request and reports of ringing in ears              With   [x] TE   [] TA   [] neuro   [] other: Patient Education: [x] Review HEP    [] Progressed/Changed HEP based on:   [x] positioning   [x] body mechanics   [] transfers   [] heat/ice application    [] other:      Other Objective/Functional Measures:   BP post TE 89/68, after 10 min rest 102/68  Patient reports dizziness  Patient requires w/c to return to car post therapy  Requires rest period after each exercise       Pain Level (0-10 scale) post treatment: 0/10    ASSESSMENT/Changes in Function: requires frequent rest periods and SOB with activities. Fatigues quickly. Low BP at end of session    Patient will continue to benefit from skilled PT services to address functional mobility deficits, address ROM deficits, address strength deficits, analyze and address soft tissue restrictions, analyze and cue movement patterns, analyze and modify body mechanics/ergonomics, assess and modify postural abnormalities, address imbalance/dizziness and instruct in home and community integration to attain remaining goals. [x]  See Plan of Care  [x]  See progress note/recertification  []  See Discharge Summary         Progress towards goals / Updated goals:  Short Term Goals: To be accomplished in 3 weeks:  1. Patient will be compliant with HEP to progress toward goals and restore functional mobility. Eval Status: issued this visit  Current: Pt reports completing every other day IMPROVING 6-25-19     2. Patient will improve FOTO score by 7 points to improve functional tolerance for exercise. Eval Status:FOTO 45  Current: 48 SLIGHT IMPROVEMENT 6-25-19     3. Pt will tolerate 10 minutes of standing therex during therapy session without seated rest break. Eval Status: pt able to tolerate 1 standing exercise prior to needing seated rest break  Current: pt tolerated 5 minutes of standing therex before sitting IMPROVING 6-25-19     Long Term Goals: To be accomplished in 6 weeks:  1. Patient will be independent with HEP to progress toward goals of prolonged ambulation with LRAD.   Eval Status: issued at Eval  Current:Pt ambulates with SPC (to back of [employer] and back to car) 6-25-19 IMPROVING     2. Patient will improve FOTO score by 13 points to improve functional tolerance for prolonged ambulation of LRAD. Eval Status: FOTO 45  Current: 48 SLIGHT IMPROVEMENT 6-25-19        3. Patient will have 4+/5 bilateral LE strength to return to goals of prolonged ambulation.   Eval Status:   Hip L (1-5) R (1-5)   Hip Flexion 3+/5 3+/5   Hip Ext       Hip ABD 4/5 4/5   Hip ADD 4/5 4/5   Hip ER 3+/5 3+/5   Hip IR 3+/5 3+/5      Knee L (1-5) R (1-5)   Knee Flexion 3+/5 3+/5   Knee Extension 3+/5 3+/5   Ankle PF       Ankle DF       Other              Current: Hip AROM: 4/5 for all IMPROVING  6-25-19                      PLAN  []  Upgrade activities as tolerated     [x]  Continue plan of care  []  Update interventions per flow sheet       []  Discharge due to:_  []  Other:_      Kermit Aponte, PT 6/27/2019  5:42 PM    Future Appointments   Date Time Provider Gurpreet Kevin   7/1/2019  4:30 PM [facility]   7/5/2019  1:30 PM Nasrin Bagley [facility]   7/8/2019  5:30 PM [facility]   7/10/2019  5:30 PM [facility]   7/15/2019  5:30 PM Oli Acuna [facility]

## 2019-06-27 NOTE — PROGRESS NOTES
[facility]  [address] Justa Perez, [address] [facility]  Ph [phone]  Fx [phone]    Physical Therapy Progress Note    Patient name: Reza Daniel Start of Care: 5/10/2019   Referral source: Ahmet Gonzalez MD : 1980                Medical Diagnosis: unsteadiness on feet    Onset Date:8 months ago                Treatment Diagnosis: Unspecified abnormalities of gait and mobility [R26.9]   Prior Hospitalization: see medical history Provider#: [id number]   Medications: Verified on Patient summary List    Comorbidities: heart disease, fibromyalgia, depression, arthritis, thyroid problems, HTN, psoriatic arthritis, rheumatoid arthritis   Prior Level of Function: functionally independent        Visits from Start of Care: 10    Missed Visits: 3    Goals/Measure of Progress:mr. Corea has been showing slow progress in overall function, ambulatory balance and strength. He continues with marked deficits in endurance requiring frequent rest periods, SOB, episodes of hypotension, deficit in strength and balance. I recommend continuation of therapy to address residual goals. Progress towards goals / Updated goals:  Short Term Goals: To be accomplished in 3 weeks:  1. Patient will be compliant with HEP to progress toward goals and restore functional mobility. Eval Status: issued this visit  Current: Pt reports completing every other day IMPROVING 6-25-19     2. Patient will improve FOTO score by 7 points to improve functional tolerance for exercise. Eval Status:FOTO 45  Current: 48 SLIGHT IMPROVEMENT -25-19     3. Pt will tolerate 10 minutes of standing therex during therapy session without seated rest break. Eval Status: pt able to tolerate 1 standing exercise prior to needing seated rest break  Current: pt tolerated 5 minutes of standing therex before sitting IMPROVING 6-25-19     Long Term Goals: To be accomplished in 6 weeks:  1.  Patient will be independent with HEP to progress toward goals of prolonged ambulation with LRAD. Eval Status: issued at Eval  Current:Pt ambulates with SPC (to back of [facility] and back to car) 6-25-19 IMPROVING     2. Patient will improve FOTO score by 13 points to improve functional tolerance for prolonged ambulation of LRAD. Eval Status: FOTO 45  Current: 48 SLIGHT IMPROVEMENT 6-25-19        3. Patient will have 4+/5 bilateral LE strength to return to goals of prolonged ambulation. Eval Status:   Hip L (1-5) R (1-5)   Hip Flexion 3+/5 3+/5   Hip Ext       Hip ABD 4/5 4/5   Hip ADD 4/5 4/5   Hip ER 3+/5 3+/5   Hip IR 3+/5 3+/5      Knee L (1-5) R (1-5)   Knee Flexion 3+/5 3+/5   Knee Extension 3+/5 3+/5   Ankle PF       Ankle DF       Other              Current: Hip AROM: 4/5 for all IMPROVING  6-25-19    Key Functional Changes: improved ambulatory balance  Updated Goals: To be achieved in 4 weeks:    LTG # 4.  Patient demonstrates ability to perform floor transfer independently with use of nearby furniture   Status on 6/27/19: patient reports inability to perform transfer but declines trial at this time    ASSESSMENT/RECOMMENDATIONS:  [x]Continue therapy per initial plan/protocol at a frequency of  2 x per week for 4 weeks  []Continue therapy with the following recommended changes:_____________________ _____________________________ ________________________________________  []Discontinue therapy progressing towards or have reached established goals  []Discontinue therapy due to lack of appreciable progress towards goals  []Discontinue therapy due to lack of attendance or compliance  []Await Physician's recommendations/decisions regarding therapy  []Other:________________________________________________________________    Thank you for this referral.   Ion Contreras, PT 6/27/2019 6:23 PM    NOTE TO PHYSICIAN:  Amelia Cheung [address]   [facility] [phone]  If you are unable to process this request in 24 hours please contact our office: [phone]      ____ I have read the above report and request that my patient continue therapy with the following changes/special instructions:  ____ I have read the above report and request that my patient be discharged from therapy    Physician's Signature:____________Date:_________TIME:________    ** Signature, Date and Time must be completed for valid certification **

## 2019-07-01 ENCOUNTER — HOSPITAL ENCOUNTER (OUTPATIENT)
Dept: PHYSICAL THERAPY | Age: 39
Discharge: HOME | End: 2019-07-01
Payer: COMMERCIAL

## 2019-07-01 PROCEDURE — 97110 THERAPEUTIC EXERCISES: CPT

## 2019-07-01 NOTE — PROGRESS NOTES
PT DAILY TREATMENT NOTE Patient Name: Nevaeh Salgado Date:2019 : 1980 [x]  Patient  Verified Payor: Marcel Beard / Plan: [employer] HMO / Product Type: HMO /   
In time:4:30  Out time:5:15 Total Treatment Time (min): 45 Total Timed Codes (min): 45 
1:1 Treatment Time ([facility] only): 45 Visit #: 00 GI 59 Treatment Area: Unspecified abnormalities of gait and mobility [R26.9] SUBJECTIVE Pain Level (0-10 scale): 0/10 Any medication changes, allergies to medications, adverse drug reactions, diagnosis change, or new procedure performed?: [x] No    [] Yes (see summary sheet for update) Subjective functional status/changes:   [] No changes reported \"I don't have any pain right now. I feel better than I did last week. \" OBJECTIVE 45 min Therapeutic Exercise:  [x] See flow sheet :  
Rationale: increase ROM, increase strength and improve coordination to improve the patients ability to return to prior level of physical activity. With 
 [] TE 
 [] TA 
 [] neuro 
 [] other: Patient Education: [x] Review HEP [] Progressed/Changed HEP based on:  
[] positioning   [] body mechanics   [] transfers   [] heat/ice application   
[] other:   
 
Other Objective/Functional Measures: Other Objective/Functional Measures Vitals Pre After warm-up During Exercise Blood pressure 119/75 mmHg 130/84 mmHg 104/76 mmHg Pain Level (0-10 scale) post treatment: 0/10 ASSESSMENT/Changes in Function: Pt arrived reporting improved general stamina. Pt's blood pressure within appropriate levels for ex before and after warm-up. Pt able to tolerate more ex with no symptoms present throughout tx. Tx terminated after  Blood pressure checked and having dropped over 20 mmHg.   
 
Patient will continue to benefit from skilled PT services to modify and progress therapeutic interventions, address functional mobility deficits, address ROM deficits, address strength deficits, analyze and address soft tissue restrictions, analyze and cue movement patterns, analyze and modify body mechanics/ergonomics and assess and modify postural abnormalities to attain remaining goals. []  See Plan of Care 
[]  See progress note/recertification 
[]  See Discharge Summary Progress towards goals / Updated goals: 
Short Term Goals: To be accomplished in 3 weeks: 1. Patient will be compliant with HEP to progress toward goals and restore functional mobility. Eval Status: issued this visit Current: Pt reports completing every other day IMPROVING 6-25-19 
  
2. Patient will improve FOTO score by 7 points to improve functional tolerance for exercise. Eval Status:FOTO 45 Current: 48 SLIGHT IMPROVEMENT 6-25-19 
  
3. Pt will tolerate 10 minutes of standing therex during therapy session without seated rest break. Eval Status: pt able to tolerate 1 standing exercise prior to needing seated rest break Current: pt tolerated 5 minutes of standing therex before sitting IMPROVING 6-25-19 
  
Long Term Goals: To be accomplished in 6 weeks: 1. Patient will be independent with HEP to progress toward goals of prolonged ambulation with LRAD. Eval Status: issued at Eval 
Current:Pt ambulates with SPC (to back of [employer] and back to car) 6-25-19 IMPROVING 
  
2. Patient will improve FOTO score by 13 points to improve functional tolerance for prolonged ambulation of LRAD. Eval Status: FOTO 45 Current: 48 SLIGHT IMPROVEMENT 6-25-19 
  
  
3. Patient will have 4+/5 bilateral LE strength to return to goals of prolonged ambulation. Eval Status:  
Hip L (1-5) R (1-5) Hip Flexion 3+/5 3+/5 Hip Ext      
Hip ABD 4/5 4/5 Hip ADD 4/5 4/5 Hip ER 3+/5 3+/5 Hip IR 3+/5 3+/5  
  
Knee L (1-5) R (1-5) Knee Flexion 3+/5 3+/5 Knee Extension 3+/5 3+/5 Ankle PF      
Ankle DF      
Other      
  
  
 Current: Hip AROM: 4/5 for all IMPROVING  6-25-19 PLAN 
 [x]  Upgrade activities as tolerated     [x]  Continue plan of care 
[]  Update interventions per flow sheet      
[]  Discharge due to:_ 
[]  Other:_ Justo Hickman, PTA 7/1/2019  4:35 PM 
 
Future Appointments Date Time Provider Gurpreet Kevin 7/5/2019  1:30 PM Kumar Roblero, ELOISA [facility]  
7/8/2019  5:30 PM [facility]  
7/10/2019  5:30 PM [facility]  
7/15/2019  5:30 PM [facility]

## 2019-07-05 ENCOUNTER — HOSPITAL ENCOUNTER (OUTPATIENT)
Dept: PHYSICAL THERAPY | Age: 39
Discharge: HOME | End: 2019-07-05
Payer: COMMERCIAL

## 2019-07-05 PROCEDURE — 97110 THERAPEUTIC EXERCISES: CPT

## 2019-07-05 NOTE — PROGRESS NOTES
PT DAILY TREATMENT NOTE Patient Name: Jamilah Olson Date:2019 : 1980 [x]  Patient  Verified Payor: Sagar Saldivar / Plan: [employer] / Product Type: HMO /   
In time:1500  Out [id number] Total Treatment Time (min): 53 Total Timed Codes (min): 53 
1:1 Treatment Time ( [address] only): NA Visit #: 12 of 20 Treatment Area: Unspecified abnormalities of gait and mobility [R26.9] SUBJECTIVE Pain Level (0-10 scale): 0/10 Any medication changes, allergies to medications, adverse drug reactions, diagnosis change, or new procedure performed?: [x] No    [] Yes (see summary sheet for update) Subjective functional status/changes:   [] No changes reported Patient reports he feels pretty good, but is tired today. OBJECTIVE 38 min Therapeutic Exercise:  [x] See flow sheet :  
Rationale: increase ROM, increase strength, improve coordination and improve balance to improve the patients ability to restore PLOF With 
 [x] TE 
 [] TA 
 [] neuro 
 [] other: Patient Education: [x] Review HEP [] Progressed/Changed HEP based on:  
[] positioning   [] body mechanics   [] transfers   [] heat/ice application   
[] other:   
 
 
Pain Level (0-10 scale) post treatment: 0/10 ASSESSMENT/Changes in Function: Patient tolerated treatment session well today. No complaints with added therex for strengthening today. Patient will continue to benefit from skilled PT services to modify and progress therapeutic interventions, address functional mobility deficits, address ROM deficits, address strength deficits, analyze and address soft tissue restrictions, analyze and cue movement patterns, analyze and modify body mechanics/ergonomics and assess and modify postural abnormalities to attain remaining goals. [x]  See Plan of Care 
[]  See progress note/recertification 
[]  See Discharge Summary Progress towards goals / Updated goals: 
Short Term Goals: To be accomplished in 3 weeks: 1. Patient will be compliant with HEP to progress toward goals and restore functional mobility. Eval Status: issued this visit Current: Pt reports completing every other day IMPROVING 6-25-19 
  
2. Patient will improve FOTO score by 7 points to improve functional tolerance for exercise. Eval Status:FOTO 45 Current: 48 SLIGHT IMPROVEMENT 6-25-19 
  
3. Pt will tolerate 10 minutes of standing therex during therapy session without seated rest break. Eval Status: pt able to tolerate 1 standing exercise prior to needing seated rest break Current: pt tolerated 5 minutes of standing therex before sitting IMPROVING 6-25-19 
  
Long Term Goals: To be accomplished in 6 weeks: 1. Patient will be independent with HEP to progress toward goals of prolonged ambulation with LRAD. Eval Status: issued at Eval 
Current:Pt ambulates with SPC (to back of [facility] and back to car) 6-25-19 IMPROVING 
  
2. Patient will improve FOTO score by 13 points to improve functional tolerance for prolonged ambulation of LRAD. Eval Status: FOTO 45 Current: 48 SLIGHT IMPROVEMENT 6-25-19 
  
  
3. Patient will have 4+/5 bilateral LE strength to return to goals of prolonged ambulation. Eval Status:  
Hip L (1-5) R (1-5) Hip Flexion 3+/5 3+/5 Hip Ext      
Hip ABD 4/5 4/5 Hip ADD 4/5 4/5 Hip ER 3+/5 3+/5 Hip IR 3+/5 3+/5  
  
Knee L (1-5) R (1-5) Knee Flexion 3+/5 3+/5 Knee Extension 3+/5 3+/5 Ankle PF      
Ankle DF      
Other      
  
  
 Current: Hip AROM: 4/5 for all IMPROVING  6-25-19 
  
 
 
PLAN [x]  Upgrade activities as tolerated     [x]  Continue plan of care 
[]  Update interventions per flow sheet      
[]  Discharge due to:_ 
[]  Other:_   
 
Chanda Fernandez, PT 7/5/2019  3:38 PM 
 
Future Appointments Date Time Provider Gurpreet Kevin 7/8/2019  5:30 PM [facility]  
7/10/2019  5:30 PM [facility]  
7/15/2019  5:30 PM [facility]

## 2019-07-08 ENCOUNTER — APPOINTMENT (OUTPATIENT)
Dept: PHYSICAL THERAPY | Age: 39
End: 2019-07-08
Payer: COMMERCIAL

## 2019-07-10 ENCOUNTER — HOSPITAL ENCOUNTER (OUTPATIENT)
Dept: PHYSICAL THERAPY | Age: 39
Discharge: HOME | End: 2019-07-10
Payer: COMMERCIAL

## 2019-07-10 PROCEDURE — 97110 THERAPEUTIC EXERCISES: CPT

## 2019-07-10 NOTE — PROGRESS NOTES
PT DAILY TREATMENT NOTE Patient Name: Delilah Paulson Date:7/10/2019 : 1980 [x]  Patient  Verified Payor: Tahira Florezswathi / Plan: [id number] / Product Type: HMO /   
In time:5:35  Out time:6:15 Total Treatment Time (min): 40 Total Timed Codes (min): NA 
1:1 Treatment Time ( [address] only): NA Visit #: 80 AO 45 Treatment Area: Unspecified abnormalities of gait and mobility [R26.9] SUBJECTIVE Pain Level (0-10 scale): 0/10 Any medication changes, allergies to medications, adverse drug reactions, diagnosis change, or new procedure performed?: [x] No    [] Yes (see summary sheet for update) Subjective functional status/changes:   [] No changes reported \"I don't have any pain right now. \" OBJECTIVE 40 min Therapeutic Exercise:  [x] See flow sheet :  
Rationale: increase ROM, increase strength and improve coordination to improve the patients ability to return to prior level of physical activity. With 
 [] TE 
 [] TA 
 [] neuro 
 [] other: Patient Education: [x] Review HEP [] Progressed/Changed HEP based on:  
[] positioning   [] body mechanics   [] transfers   [] heat/ice application   
[] other:   
 
Other Objective/Functional Measures: Other Objective/Functional Measures Vitals Pre After warm-up After Session Blood pressure 108/62 mmHg 110/82 mmHg 107/75 mmHg Pain Level (0-10 scale) post treatment: 0/10 ASSESSMENT/Changes in Function: Pt arrived in clinic reported abnormal sleep schedule, having just woken up from sleeping during the day with the inability to sleep at night. Pt blood pressure remained within safe levels for exercises throughout tx. Pt was greatly fatigued by supine ex with occasional reports of mild light headedness but immediately decreased by rest break.  
 
Patient will continue to benefit from skilled PT services to modify and progress therapeutic interventions, address functional mobility deficits, address ROM deficits, address strength deficits, analyze and address soft tissue restrictions, analyze and cue movement patterns, analyze and modify body mechanics/ergonomics and assess and modify postural abnormalities to attain remaining goals. []  See Plan of Care 
[]  See progress note/recertification 
[]  See Discharge Summary Progress towards goals / Updated goals: 
Short Term Goals: To be accomplished in 3 weeks: 1. Patient will be compliant with HEP to progress toward goals and restore functional mobility. Eval Status: issued this visit Current: Pt reports sporadic compliance of ex 7/10/19 
  
2. Patient will improve FOTO score by 7 points to improve functional tolerance for exercise. Eval Status:FOTO 45 Current: 48 SLIGHT IMPROVEMENT 6-25-19 
  
3. Pt will tolerate 10 minutes of standing therex during therapy session without seated rest break. Eval Status: pt able to tolerate 1 standing exercise prior to needing seated rest break Current: pt tolerated 5 minutes of standing therex before sitting IMPROVING 6-25-19 
  
Long Term Goals: To be accomplished in 6 weeks: 1. Patient will be independent with HEP to progress toward goals of prolonged ambulation with LRAD. Eval Status: issued at Eval 
Current:Pt ambulates with SPC (to back of [employer] and back to car) 6-25-19 IMPROVING 
  
2. Patient will improve FOTO score by 13 points to improve functional tolerance for prolonged ambulation of LRAD. Eval Status: FOTO 45 Current: 48 SLIGHT IMPROVEMENT 6-25-19 
  
  
3. Patient will have 4+/5 bilateral LE strength to return to goals of prolonged ambulation. Eval Status:  
Hip L (1-5) R (1-5) Hip Flexion 3+/5 3+/5 Hip Ext      
Hip ABD 4/5 4/5 Hip ADD 4/5 4/5 Hip ER 3+/5 3+/5 Hip IR 3+/5 3+/5  
  
Knee L (1-5) R (1-5) Knee Flexion 3+/5 3+/5 Knee Extension 3+/5 3+/5 Ankle PF      
Ankle DF      
Other      
  
  
 Current: Hip AROM: 4/5 for all IMPROVING  6-25-19 
  
 
 
 
 
 PLAN 
[x]  Upgrade activities as tolerated     [x]  Continue plan of care 
[]  Update interventions per flow sheet      
[]  Discharge due to:_ 
[]  Other:_ Skyla De Paz PTA 7/10/2019  5:44 PM 
 
Future Appointments Date Time Provider Gurpreet Kevin 7/15/2019  5:30 PM Cara Vences PTA [facility]  
7/23/2019  1:30 PM [facility]  
7/25/2019  3:00 PM [facility]  
7/29/2019  5:00 PM [facility]  
7/31/2019  5:30 PM Zaid Hinojosa PT [facility]

## 2019-07-15 ENCOUNTER — HOSPITAL ENCOUNTER (OUTPATIENT)
Dept: PHYSICAL THERAPY | Age: 39
Discharge: HOME | End: 2019-07-15
Payer: COMMERCIAL

## 2019-07-15 PROCEDURE — 97110 THERAPEUTIC EXERCISES: CPT

## 2019-07-15 NOTE — PROGRESS NOTES
PT DAILY TREATMENT NOTE Patient Name: Dedra Mendosa Date:7/15/2019 : 1980 [x]  Patient  Verified Payor: Stepan Kirkland / Plan: [id number] [facility] HMO / Product Type: HMO /   
In time:5:45  Out time:6:35 Total Treatment Time (min): 50 Total Timed Codes (min): NA 
1:1 Treatment Time ([address] only): NA Visit #: 85 VZ 61 Treatment Area: Unspecified abnormalities of gait and mobility [R26.9] SUBJECTIVE Pain Level (0-10 scale): 0/10 Any medication changes, allergies to medications, adverse drug reactions, diagnosis change, or new procedure performed?: [x] No    [] Yes (see summary sheet for update) Subjective functional status/changes:   [] No changes reported \"I don't have any pain. I'm tired though. \" OBJECTIVE 50 min Therapeutic Exercise:  [x] See flow sheet :  
Rationale: increase ROM, increase strength and improve coordination to improve the patients ability to return to prior level of physical activity. With 
 [] TE 
 [] TA 
 [] neuro 
 [] other: Patient Education: [x] Review HEP [] Progressed/Changed HEP based on:  
[] positioning   [] body mechanics   [] transfers   [] heat/ice application   
[] other:   
 
Other Objective/Functional Measures:  
 
Pain Level (0-10 scale) post treatment: 0/10 ASSESSMENT/Changes in Function: Pt tolerated session well. Pt continues to be challenged by therex, requiring multiple rest breaks. Pt performed step ups with increased speed and became greatly fatigued requiring seated rest breaks. Patient will continue to benefit from skilled PT services to modify and progress therapeutic interventions, address functional mobility deficits, address ROM deficits, address strength deficits, analyze and address soft tissue restrictions, analyze and cue movement patterns, analyze and modify body mechanics/ergonomics and assess and modify postural abnormalities to attain remaining goals. []  See Plan of Care 
[]  See progress note/recertification []  See Discharge Summary Progress towards goals / Updated goals: 
Short Term Goals: To be accomplished in 3 weeks: 1. Patient will be compliant with HEP to progress toward goals and restore functional mobility. Eval Status: issued this visit Current: Pt reports sporadic compliance of ex 7/10/19 
  
2. Patient will improve FOTO score by 7 points to improve functional tolerance for exercise. Eval Status:FOTO 45 Current: 48 SLIGHT IMPROVEMENT 6-25-19 
  
3. Pt will tolerate 10 minutes of standing therex during therapy session without seated rest break. Eval Status: pt able to tolerate 1 standing exercise prior to needing seated rest break Current: pt tolerated 5 minutes of standing therex before sitting IMPROVING 6-25-19 
  
Long Term Goals: To be accomplished in 6 weeks: 1. Patient will be independent with HEP to progress toward goals of prolonged ambulation with LRAD. Eval Status: issued at Eval 
Current:Pt ambulates with SPC (to back of [facility] and back to car) 6-25-19 IMPROVING 
  
2. Patient will improve FOTO score by 13 points to improve functional tolerance for prolonged ambulation of LRAD. Eval Status: FOTO 45 Current: 48 SLIGHT IMPROVEMENT 6-25-19 
  
  
3. Patient will have 4+/5 bilateral LE strength to return to goals of prolonged ambulation. Eval Status:  
Hip L (1-5) R (1-5) Hip Flexion 3+/5 3+/5 Hip Ext      
Hip ABD 4/5 4/5 Hip ADD 4/5 4/5 Hip ER 3+/5 3+/5 Hip IR 3+/5 3+/5  
  
Knee L (1-5) R (1-5) Knee Flexion 3+/5 3+/5 Knee Extension 3+/5 3+/5 Ankle PF      
Ankle DF      
Other      
  
  
 Current: Hip AROM: 4/5 for all IMPROVING  6-25-19 
  
 
 
 
 
PLAN [x]  Upgrade activities as tolerated     [x]  Continue plan of care 
[]  Update interventions per flow sheet      
[]  Discharge due to:_ 
[]  Other:_ Dory Francois, DWAYNE 7/15/2019  5:55 PM 
 
Future Appointments Date Time Provider Gurpreet Kevin 7/23/2019  1:30 PM [facility]  
7/25/2019  3:00 PM [facility]  
7/29/2019  5:00 PM [facility]  
7/31/2019  5:30 PM Sadaf Huggins PT [facility]

## 2019-07-23 ENCOUNTER — HOSPITAL ENCOUNTER (OUTPATIENT)
Dept: PHYSICAL THERAPY | Age: 39
Discharge: HOME | End: 2019-07-23
Payer: COMMERCIAL

## 2019-07-23 PROCEDURE — 97110 THERAPEUTIC EXERCISES: CPT

## 2019-07-23 NOTE — PROGRESS NOTES
PT DAILY TREATMENT NOTE    Patient Name: Cheikh Buhl  Date:2019  : 1980  [x]  Patient  Verified  Payor: Cedric Mcguire / Plan: [employer] HMO / Product Type: HMO /    In time:1:40  Out time:2:25  Total Treatment Time (min): 45  Total Timed Codes (min): NA  1:1 Treatment Time ([facility] only): NA   Visit #: 15 of 20    Treatment Area: Unspecified abnormalities of gait and mobility [R26.9]    SUBJECTIVE  Pain Level (0-10 scale): 0/10  Any medication changes, allergies to medications, adverse drug reactions, diagnosis change, or new procedure performed?: [x] No    [] Yes (see summary sheet for update)  Subjective functional status/changes:   [] No changes reported  \"I feel ok today. \"    OBJECTIVE      45 min Therapeutic Exercise:  [x] See flow sheet :   Rationale: increase ROM, increase strength and improve coordination to improve the patients ability to return to prior level of physical activity. With   [] TE   [] TA   [] neuro   [] other: Patient Education: [x] Review HEP    [] Progressed/Changed HEP based on:   [] positioning   [] body mechanics   [] transfers   [] heat/ice application    [] other:      Other Objective/Functional Measures: Other Objective/Functional Measures  Vitals Pre After Attempted Standing Ex After Rest break Post Ex   Blood pressure 112/83 mmHg 100/60 mmHg 110/76 mmHg 110/80 mmHg     Pain Level (0-10 scale) post treatment: 0/10    ASSESSMENT/Changes in Function: Pt tolerated session well with no increased pain. Pt did perform standing exercises (Hip-3-way) overly fast and required immediate rest break, reporting increased presyncope symptoms. Pt moved to bed and after rest beak, rechecked BP and found to be at acceptable level. Pt performed bed ex with no increases in symptoms. Pt shows normal BP post tx.      Patient will continue to benefit from skilled PT services to modify and progress therapeutic interventions, address functional mobility deficits, address ROM deficits, address strength deficits, analyze and address soft tissue restrictions, analyze and cue movement patterns, analyze and modify body mechanics/ergonomics and assess and modify postural abnormalities to attain remaining goals. []  See Plan of Care  []  See progress note/recertification  []  See Discharge Summary         Progress towards goals / Updated goals:  Short Term Goals: To be accomplished in 3 weeks:  1. Patient will be compliant with HEP to progress toward goals and restore functional mobility. Eval Status: issued this visit  Current: Pt reports sporadic compliance of ex 7/10/19     2. Patient will improve FOTO score by 7 points to improve functional tolerance for exercise. Eval Status:FOTO 45  Current: 48 SLIGHT IMPROVEMENT 6-25-19     3. Pt will tolerate 10 minutes of standing therex during therapy session without seated rest break. Eval Status: pt able to tolerate 1 standing exercise prior to needing seated rest break  Current: Pt still greatly fatigued by standing therex and requires rest break after single ex 7/23/19     Long Term Goals: To be accomplished in 6 weeks:  1. Patient will be independent with HEP to progress toward goals of prolonged ambulation with LRAD. Eval Status: issued at Eval  Current:Pt ambulates with SPC (to back of [facility] and back to car) 6-25-19 IMPROVING     2. Patient will improve FOTO score by 13 points to improve functional tolerance for prolonged ambulation of LRAD. Eval Status: FOTO 45  Current: 48 SLIGHT IMPROVEMENT 6-25-19        3. Patient will have 4+/5 bilateral LE strength to return to goals of prolonged ambulation.   Eval Status:   Hip L (1-5) R (1-5)   Hip Flexion 3+/5 3+/5   Hip Ext       Hip ABD 4/5 4/5   Hip ADD 4/5 4/5   Hip ER 3+/5 3+/5   Hip IR 3+/5 3+/5      Knee L (1-5) R (1-5)   Knee Flexion 3+/5 3+/5   Knee Extension 3+/5 3+/5   Ankle PF       Ankle DF       Other              Current: Hip AROM: 4/5 for all IMPROVING  6-25-19               PLAN  [x] Upgrade activities as tolerated     [x]  Continue plan of care  []  Update interventions per flow sheet       []  Discharge due to:_  []  Other:_      Dory Francois PTA 7/23/2019  3:04 PM    Future Appointments   Date Time Provider Gurpreet Kevin   7/25/2019  3:00 PM [facility]   7/29/2019  5:00 PM [facility]   7/31/2019  5:30 PM [facility]

## 2019-07-25 ENCOUNTER — HOSPITAL ENCOUNTER (OUTPATIENT)
Dept: PHYSICAL THERAPY | Age: 39
Discharge: HOME | End: 2019-07-25
Payer: COMMERCIAL

## 2019-07-25 PROCEDURE — 97110 THERAPEUTIC EXERCISES: CPT

## 2019-07-29 ENCOUNTER — HOSPITAL ENCOUNTER (OUTPATIENT)
Dept: PHYSICAL THERAPY | Age: 39
Discharge: HOME | End: 2019-07-29
Payer: COMMERCIAL

## 2019-07-29 PROCEDURE — 97110 THERAPEUTIC EXERCISES: CPT

## 2019-07-29 NOTE — PROGRESS NOTES
PT DAILY TREATMENT NOTE 1216    Patient Name: Deanna Granda  Date:2019  : 1980  [x]  Patient  Verified  Payor: Wanda Joseph / Plan: [employer] Paget HMO / Product Type: HMO /    In time:5:05  Out time:5:51  Total Treatment Time (min): 55  Visit #: 17 of 20    Treatment Area: Unspecified abnormalities of gait and mobility [R26.9]    SUBJECTIVE  Pain Level (0-10 scale): 0/10  Any medication changes, allergies to medications, adverse drug reactions, diagnosis change, or new procedure performed?: [x] No    [] Yes (see summary sheet for update)  Subjective functional status/changes:   [] No changes reported  \"No pain. \"    OBJECTIVE      46 min Therapeutic Exercise:  [x] See flow sheet :   Rationale: increase ROM, increase strength and improve coordination to improve the patients ability to return to prior level of physical activity. With   [] TE   [] TA   [] neuro   [] other: Patient Education: [x] Review HEP    [] Progressed/Changed HEP based on:   [] positioning   [] body mechanics   [] transfers   [] heat/ice application    [] other:      Other Objective/Functional Measures: updated goals below     Pain Level (0-10 scale) post treatment: 0/10    ASSESSMENT/Changes in Function: Pt tolerated session well. Pt shows definite strength improvements as well as with tolerance to activity. Pt continues to require rest breaks between exercises but is able to tolerate more strenuous activities. Pt reports little participation with HEP at this time. PTA redistributed HEP to promote maximum benefit with ex. Patient will continue to benefit from skilled PT services to modify and progress therapeutic interventions, address functional mobility deficits, address ROM deficits, address strength deficits, analyze and address soft tissue restrictions, analyze and cue movement patterns, analyze and modify body mechanics/ergonomics and assess and modify postural abnormalities to attain remaining goals.      []  See Plan of Care  []  See progress note/recertification  []  See Discharge Summary         Progress towards goals / Updated goals:  Short Term Goals: To be accomplished in 3 weeks:  1. Patient will be compliant with HEP to progress toward goals and restore functional mobility. Eval Status: issued this visit  Current: Pt reports little compliance at this time 7/29/19     2. Patient will improve FOTO score by 7 points to improve functional tolerance for exercise. Eval Status:FOTO 45  Current: 45 7/25/19     3. Pt will tolerate 10 minutes of standing therex during therapy session without seated rest break. Eval Status: pt able to tolerate 1 standing exercise prior to needing seated rest break  Current:Pt able to tolerate 1-2 standing exercises before rest break 7/29/19      Long Term Goals: To be accomplished in 6 weeks:  1. Patient will be independent with HEP to progress toward goals of prolonged ambulation with LRAD. Eval Status: issued at Eval  Current: Pt reports very little participation with HEP 7/29/19     2. Patient will improve FOTO score by 13 points to improve functional tolerance for prolonged ambulation of LRAD. Eval Status: FOTO 45  Current: 48 SLIGHT IMPROVEMENT 6-25-19        3. Patient will have 4+/5 bilateral LE strength to return to goals of prolonged ambulation.   Eval Status:   Hip L (1-5) R (1-5) L  7/29/19 R     Hip Flexion 3+/5 3+/5 4/5 4/5   Hip Ext     4/5 4/5   Hip ABD 4/5 4/5 4+/5 4+/5   Hip ADD 4/5 4/5 4/5 4/5   Hip ER 3+/5 3+/5 4-/5 4-/5   Hip IR 3+/5 3+/5 4-/5 4-/5      Knee L (1-5) R (1-5) L  7/29/19 R  7/29/19   Knee Flexion 3+/5 3+/5 4+/5 4+/5   Knee Extension 3+/5 3+/5 4+/5 4+/5   Ankle PF         Ankle DF         Other                Current: See Above 7/29/19               PLAN  [x]  Upgrade activities as tolerated     [x]  Continue plan of care  []  Update interventions per flow sheet       []  Discharge due to:_  []  Other:_      Justo Hickman, PTA 7/29/2019  5:24 PM    No future appointments.

## 2019-07-29 NOTE — PROGRESS NOTES
[facility] at [address] [place], [address]  Ph [phone]  Fx [phone]    Physical Therapy Progress Note  Patient name: Corinna Man Start of Care: 5/10/19   Referral source: Marine Jeronimo MD : 1980   Medical/Treatment Diagnosis: Unspecified abnormalities of gait and mobility [R26.9] Onset Date:8 months ago   Prior Hospitalization: see medical history Provider#: [id number]   Medications: Verified on Patient Summary List    Comorbidities: heart disease, fibromyalgia, depression, arthritis, thyroid problems, HTN, psoriatic arthritis, rheumatoid arthritis  Prior Level of Function: functionally independent    Visits from Start of Care: 17    Missed Visits: 4    Goals/Measure of Progress:    Short Term Goals: To be accomplished in 3 weeks:  1. Patient will be compliant with HEP to progress toward goals and restore functional mobility. Eval Status: issued this visit  Current: Pt reports little compliance at this time 19     2. Patient will improve FOTO score by 7 points to improve functional tolerance for exercise. Eval Status:FOTO 45  Current: 45 19     3. Pt will tolerate 10 minutes of standing therex during therapy session without seated rest break. Eval Status: pt able to tolerate 1 standing exercise prior to needing seated rest break  Current:Pt able to tolerate 1-2 standing exercises before rest break 19      Long Term Goals: To be accomplished in 6 weeks:  1. Patient will be independent with HEP to progress toward goals of prolonged ambulation with LRAD. Eval Status: issued at Eval  Current: Pt reports very little participation with HEP 19     2. Patient will improve FOTO score by 13 points to improve functional tolerance for prolonged ambulation of LRAD. Eval Status: FOTO 45  Current: 48 SLIGHT IMPROVEMENT 19        3. Patient will have 4+/5 bilateral LE strength to return to goals of prolonged ambulation.   Eval Status:   Hip L (1-5) R (1-5) L  7/29/19 R      Hip Flexion 3+/5 3+/5 4/5 4/5   Hip Ext     4/5 4/5   Hip ABD 4/5 4/5 4+/5 4+/5   Hip ADD 4/5 4/5 4/5 4/5   Hip ER 3+/5 3+/5 4-/5 4-/5   Hip IR 3+/5 3+/5 4-/5 4-/5      Knee L (1-5) R (1-5) L  7/29/19 R  7/29/19   Knee Flexion 3+/5 3+/5 4+/5 4+/5   Knee Extension 3+/5 3+/5 4+/5 4+/5   Ankle PF           Ankle DF           Other                  Current: See Above 7/29/19       Key Functional Changes: improved strength, improved exercise tolerance, improved FOTO, improved ambulation    Updated Goals:  To be achieved in 4 weeks:      ASSESSMENT/RECOMMENDATIONS:  [x]Continue therapy per initial plan/protocol at a frequency of  2 x per week for 4 weeks  []Continue therapy with the following recommended changes:_____________________   []Discontinue therapy progressing towards or have reached established goals  []Discontinue therapy due to lack of appreciable progress towards goals   []Discontinue therapy due to lack of attendance or compliance  []Await Physician's recommendations/decisions regarding therapy  []Other:________________________________________________________________    Thank you for this referral.   Cynthia Velarde, PT 7/29/2019 6:57 PM    NOTE TO PHYSICIAN:  [address]   FAX TO [facility]: [phone]  If you are unable to process this request in 24 hours please contact our office: [phone]      ____ I have read the above report and request that my patient continue therapy with the following changes/special instructions:  ____ I have read the above report and request that my patient be discharged from therapy    Physician's Signature:____________Date:_________TIME:________    ** Signature, Date and Time must be completed for valid certification **

## 2019-07-31 ENCOUNTER — APPOINTMENT (OUTPATIENT)
Dept: PHYSICAL THERAPY | Age: 39
End: 2019-07-31
Payer: COMMERCIAL

## 2019-08-06 ENCOUNTER — HOSPITAL ENCOUNTER (OUTPATIENT)
Dept: LAB | Age: 39
Discharge: HOME | End: 2019-08-06
Attending: FAMILY MEDICINE
Payer: COMMERCIAL

## 2019-08-06 LAB
ALBUMIN SERPL-MCNC: 3 G/DL (ref 3.4–5)
ALBUMIN/GLOB SERPL: 0.7 {RATIO} (ref 0.8–1.7)
ALP SERPL-CCNC: 119 U/L (ref 45–117)
ALT SERPL-CCNC: 19 U/L (ref 16–61)
ANION GAP SERPL CALC-SCNC: 10 MMOL/L (ref 3–18)
AST SERPL-CCNC: 17 U/L (ref 10–38)
BASOPHILS # BLD: 0 K/UL (ref 0–0.1)
BASOPHILS NFR BLD: 0 % (ref 0–2)
BILIRUB SERPL-MCNC: 0.3 MG/DL (ref 0.2–1)
BUN SERPL-MCNC: 39 MG/DL (ref 7–18)
BUN/CREAT SERPL: 15 (ref 12–20)
CALCIUM SERPL-MCNC: 9.2 MG/DL (ref 8.5–10.1)
CHLORIDE SERPL-SCNC: 105 MMOL/L (ref 100–111)
CHOLEST SERPL-MCNC: 160 MG/DL
CO2 SERPL-SCNC: 27 MMOL/L (ref 21–32)
CREAT SERPL-MCNC: 2.58 MG/DL (ref 0.6–1.3)
CREAT UR-MCNC: 150 MG/DL (ref 30–125)
DIFFERENTIAL METHOD BLD: ABNORMAL
EOSINOPHIL # BLD: 0.4 K/UL (ref 0–0.4)
EOSINOPHIL NFR BLD: 4 % (ref 0–5)
ERYTHROCYTE [DISTWIDTH] IN BLOOD BY AUTOMATED COUNT: 14.6 % (ref 11.6–14.5)
GLOBULIN SER CALC-MCNC: 4.2 G/DL (ref 2–4)
GLUCOSE SERPL-MCNC: 96 MG/DL (ref 74–99)
HCT VFR BLD AUTO: 33.4 % (ref 36–48)
HDLC SERPL-MCNC: 28 MG/DL (ref 40–60)
HDLC SERPL: 5.7 {RATIO} (ref 0–5)
HGB BLD-MCNC: 10.3 G/DL (ref 13–16)
LDLC SERPL CALC-MCNC: 79.4 MG/DL (ref 0–100)
LIPID PROFILE,FLP: ABNORMAL
LYMPHOCYTES # BLD: 1.8 K/UL (ref 0.9–3.6)
LYMPHOCYTES NFR BLD: 18 % (ref 21–52)
MCH RBC QN AUTO: 24.8 PG (ref 24–34)
MCHC RBC AUTO-ENTMCNC: 30.8 G/DL (ref 31–37)
MCV RBC AUTO: 80.5 FL (ref 74–97)
MICROALBUMIN UR-MCNC: 104 MG/DL (ref 0–3)
MICROALBUMIN/CREAT UR-RTO: 693 MG/G (ref 0–30)
MONOCYTES # BLD: 0.7 K/UL (ref 0.05–1.2)
MONOCYTES NFR BLD: 7 % (ref 3–10)
NEUTS SEG # BLD: 7 K/UL (ref 1.8–8)
NEUTS SEG NFR BLD: 71 % (ref 40–73)
PLATELET # BLD AUTO: 351 K/UL (ref 135–420)
PMV BLD AUTO: 8.3 FL (ref 9.2–11.8)
POTASSIUM SERPL-SCNC: 4.5 MMOL/L (ref 3.5–5.5)
PROT SERPL-MCNC: 7.2 G/DL (ref 6.4–8.2)
RBC # BLD AUTO: 4.15 M/UL (ref 4.7–5.5)
SODIUM SERPL-SCNC: 142 MMOL/L (ref 136–145)
TRIGL SERPL-MCNC: 263 MG/DL (ref ?–150)
TSH SERPL DL<=0.05 MIU/L-ACNC: 0.74 UIU/ML (ref 0.36–3.74)
VLDLC SERPL CALC-MCNC: 52.6 MG/DL
WBC # BLD AUTO: 9.8 K/UL (ref 4.6–13.2)

## 2019-08-06 PROCEDURE — 80061 LIPID PANEL: CPT

## 2019-08-06 PROCEDURE — 80053 COMPREHEN METABOLIC PANEL: CPT

## 2019-08-06 PROCEDURE — 84443 ASSAY THYROID STIM HORMONE: CPT

## 2019-08-06 PROCEDURE — 36415 COLL VENOUS BLD VENIPUNCTURE: CPT

## 2019-08-06 PROCEDURE — 82043 UR ALBUMIN QUANTITATIVE: CPT

## 2019-08-06 PROCEDURE — 85025 COMPLETE CBC W/AUTO DIFF WBC: CPT

## 2019-08-07 ENCOUNTER — HOSPITAL ENCOUNTER (OUTPATIENT)
Dept: PHYSICAL THERAPY | Age: 39
End: 2019-08-07
Payer: COMMERCIAL

## 2019-08-07 ENCOUNTER — HOSPITAL ENCOUNTER (OUTPATIENT)
Dept: PHYSICAL THERAPY | Age: 39
Discharge: HOME | End: 2019-08-07
Payer: COMMERCIAL

## 2019-08-07 LAB
FAX TO INFO,FAXT: NORMAL
FAX TO NUMBER,FAXN: NORMAL

## 2019-08-07 PROCEDURE — 97110 THERAPEUTIC EXERCISES: CPT

## 2019-08-07 NOTE — PROGRESS NOTES
PT DAILY TREATMENT NOTE Patient Name: Mary Ellen Cam Date:2019 : 1980 [x]  Patient  Verified Payor: Iris Lefort / Plan: [id number] HMO / Product Type: HMO /   
In time:6:04  Out time:6:48 Total Treatment Time (min): 44 Total Timed Codes (min): 44 
1:1 Treatment Time ( [address] only): NA Visit #: 74 OJ 16 Treatment Area: Unspecified abnormalities of gait and mobility [R26.9] SUBJECTIVE Pain Level (0-10 scale): 0/10 Any medication changes, allergies to medications, adverse drug reactions, diagnosis change, or new procedure performed?: [x] No    [] Yes (see summary sheet for update) Subjective functional status/changes:   [] No changes reported \"I don't have any pain right now. I feel good. \" OBJECTIVE 44 min Therapeutic Exercise:  [x] See flow sheet :  
Rationale: increase ROM, increase strength and improve coordination to improve the patients ability to return to prior level of physical activity. With 
 [] TE 
 [] TA 
 [] neuro 
 [] other: Patient Education: [x] Review HEP [] Progressed/Changed HEP based on:  
[] positioning   [] body mechanics   [] transfers   [] heat/ice application   
[] other:   
 
Other Objective/Functional Measures:   
 
Pain Level (0-10 scale) post treatment: 0/10 ASSESSMENT/Changes in Function: Pt tolerated progressed ex with several rest breaks between but not above tolerance. Pt reported increased fatigue post tx. Pt showed no unsafe fluctuations in BP. Patient will continue to benefit from skilled PT services to modify and progress therapeutic interventions, address functional mobility deficits, address ROM deficits, address strength deficits, analyze and address soft tissue restrictions, analyze and cue movement patterns, analyze and modify body mechanics/ergonomics and assess and modify postural abnormalities to attain remaining goals. []  See Plan of Care 
[]  See progress note/recertification 
[]  See Discharge Summary Progress towards goals / Updated goals: 
Short Term Goals: To be accomplished in 3 weeks: 1. Patient will be compliant with HEP to progress toward goals and restore functional mobility. Eval Status: issued this visit Current: Pt reports 1x/week compliance with HEP 8/7/19 
  
2. Patient will improve FOTO score by 7 points to improve functional tolerance for exercise. Eval Status:FOTO 45 Current: 45 7/25/19 
  
3. Pt will tolerate 10 minutes of standing therex during therapy session without seated rest break. Eval Status: pt able to tolerate 1 standing exercise prior to needing seated rest break Current:Pt able to tolerate 1-2 standing exercises before rest break 7/29/19  
  
Long Term Goals: To be accomplished in 6 weeks: 1. Patient will be independent with HEP to progress toward goals of prolonged ambulation with LRAD. Eval Status: issued at Eval 
Current: Pt reports very little participation with HEP 7/29/19 
  
2. Patient will improve FOTO score by 13 points to improve functional tolerance for prolonged ambulation of LRAD. Eval Status: FOTO 45 Current: 48 SLIGHT IMPROVEMENT 6-25-19 
  
  
3. Patient will have 4+/5 bilateral LE strength to return to goals of prolonged ambulation. Eval Status:  
Hip L (1-5) R (1-5) L 
7/29/19 R 
   
Hip Flexion 3+/5 3+/5 4/5 4/5 Hip Ext     4/5 4/5 Hip ABD 4/5 4/5 4+/5 4+/5 Hip ADD 4/5 4/5 4/5 4/5 Hip ER 3+/5 3+/5 4-/5 4-/5 Hip IR 3+/5 3+/5 4-/5 4-/5  
  
Knee L (1-5) R (1-5) L 
7/29/19 R 
7/29/19 Knee Flexion 3+/5 3+/5 4+/5 4+/5 Knee Extension 3+/5 3+/5 4+/5 4+/5 Ankle PF          
Ankle DF          
Other          
  
  
 Current: See Above 7/29/19 
  
 
 
PLAN [x]  Upgrade activities as tolerated     [x]  Continue plan of care 
[]  Update interventions per flow sheet      
[]  Discharge due to:_ 
[]  Other:_ Mardel Degree, PTA 8/7/2019  6:09 PM 
 
Future Appointments Date Time Provider Gurpreet Kevin 8/12/2019  5:30 PM Zella Claude, [facility]  
8/22/2019  5:30 PM [facility] PT [facility]  
8/26/2019  5:30 PM Zella Claude, PT [facility]

## 2019-08-22 ENCOUNTER — HOSPITAL ENCOUNTER (OUTPATIENT)
Dept: PHYSICAL THERAPY | Age: 39
Discharge: HOME | End: 2019-08-22
Payer: COMMERCIAL

## 2019-08-22 PROCEDURE — 97530 THERAPEUTIC ACTIVITIES: CPT

## 2019-08-22 PROCEDURE — 97110 THERAPEUTIC EXERCISES: CPT

## 2019-08-22 NOTE — PROGRESS NOTES
PT DAILY TREATMENT NOTE    Patient Name: Efe Sanchez  Date:2019  : 1980  [x]  Patient  Verified  Payor: Liz Vargas / Plan: Sridhar Rodriguez HMO / Product Type: HMO /    In time:536  Out time:622  Total Treatment Time (min): 46  Total Timed Codes (min): 46  1:1 Treatment Time ( [address] only):    Visit #:     Treatment Area: Unspecified abnormalities of gait and mobility [R26.9]    SUBJECTIVE  Pain Level (0-10 scale): 0  Any medication changes, allergies to medications, adverse drug reactions, diagnosis change, or new procedure performed?: [x] No    [] Yes (see summary sheet for update)  Subjective functional status/changes:   [] No changes reported  Patient states: \"I haven't been doing the exercises as much lately because I have not been feeling well. \"    OBJECTIVE    30 min Therapeutic Exercise:  [x] See flow sheet :   Rationale: increase ROM, increase strength and decrease pain to improve the patients ability to complete ADLs    16 min Therapeutic Activity:  [x]  See flow sheet :   Rationale: increase ROM, increase strength and improve coordination  to improve the patients ability to Complete ADLS          With   [] TE   [] TA   [] neuro   [] other: Patient Education: [x] Review HEP    [] Progressed/Changed HEP based on:   [] positioning   [] body mechanics   [] transfers   [] heat/ice application    [] other:      Other Objective/Functional Measures: NA     Pain Level (0-10 scale) post treatment: 0    ASSESSMENT/Changes in Function: Patient reports doing exercises only about once per week at home. Pt fatigued quickly with functional exercises and required frequent rest periods. Extensive discussion with patient and spouse regarding importance of regular performance of HEP in order to progress functional strength and endurance. Advised patient to discuss with clinician at next visit which exercises are highest priority for ongoing independent HEP.  No adverse effects were noted from today's treatment session     Patient will continue to benefit from skilled PT services to modify and progress therapeutic interventions, address functional mobility deficits, address ROM deficits, address strength deficits, analyze and address soft tissue restrictions, analyze and cue movement patterns, analyze and modify body mechanics/ergonomics, assess and modify postural abnormalities,  and instruct in home and community integration to attain remaining goals. []  See Plan of Care  []  See progress note/recertification  []  See Discharge Summary         Progress towards goals / Updated goals:  Short Term Goals: To be accomplished in 3 weeks:  1. Patient will be compliant with HEP to progress toward goals and restore functional mobility. Eval Status: issued this visit  Current: Patient still not compliant and is to discuss priority exercises for HEP with clinician next session. 8/22/19     2. Patient will improve FOTO score by 7 points to improve functional tolerance for exercise. Eval Status:FOTO 45  Current: 45 7/25/19     3. Pt will tolerate 10 minutes of standing therex during therapy session without seated rest break. Eval Status: pt able to tolerate 1 standing exercise prior to needing seated rest break  Current:Pt able to tolerate 1-2 standing exercises before rest break 7/29/19      Long Term Goals: To be accomplished in 6 weeks:  1. Patient will be independent with HEP to progress toward goals of prolonged ambulation with LRAD. Eval Status: issued at Eval  Current: Pt reports very little participation with HEP 7/29/19     2. Patient will improve FOTO score by 13 points to improve functional tolerance for prolonged ambulation of LRAD. Eval Status: FOTO 45  Current: 48 SLIGHT IMPROVEMENT 6-25-19        3. Patient will have 4+/5 bilateral LE strength to return to goals of prolonged ambulation.   Eval Status:   Hip L (1-5) R (1-5) L  7/29/19 R      Hip Flexion 3+/5 3+/5 4/5 4/5   Hip Ext     4/5 4/5   Hip ABD 4/5 4/5 4+/5 4+/5   Hip ADD 4/5 4/5 4/5 4/5   Hip ER 3+/5 3+/5 4-/5 4-/5   Hip IR 3+/5 3+/5 4-/5 4-/5      Knee L (1-5) R (1-5) L  7/29/19 R  7/29/19   Knee Flexion 3+/5 3+/5 4+/5 4+/5   Knee Extension 3+/5 3+/5 4+/5 4+/5   Ankle PF           Ankle DF           Other                  Current: See Above 7/29/19            PLAN  []  Upgrade activities as tolerated     [x]  Continue plan of care  []  Update interventions per flow sheet       []  Discharge due to:_  [x]  Other:_Patient to discuss with spouse and clinician next visit priorities for HEP as patient nears completion of course of PT.       Sagar Zee PT, DPT 8/22/2019  5:36 PM    Future Appointments   Date Time Provider Gurpreet Kevin   8/26/2019  5:30 PM Srinivasan Mcnair PT [facility]

## 2019-08-26 ENCOUNTER — HOSPITAL ENCOUNTER (OUTPATIENT)
Dept: PHYSICAL THERAPY | Age: 39
Discharge: HOME | End: 2019-08-26
Payer: COMMERCIAL

## 2019-08-26 PROCEDURE — 97110 THERAPEUTIC EXERCISES: CPT

## 2019-08-26 NOTE — PROGRESS NOTES
PT DAILY TREATMENT NOTE    Patient Name: Seth Kirkpatrick  Date:2019  : 1980  [x]  Patient  Verified  Payor: Isidoro Moore / Plan: Earla [employer]igler HMO / Product Type: HMO /    In time:1730  Out time:1830  Total Treatment Time (min): 60  Total Timed Codes (min): 55  1:1 Treatment Time ( only): NA   Visit #: 20 of 28    Treatment Area: Unspecified abnormalities of gait and mobility [R26.9]    SUBJECTIVE  Pain Level (0-10 scale): 0/10  Any medication changes, allergies to medications, adverse drug reactions, diagnosis change, or new procedure performed?: [x] No    [] Yes (see summary sheet for update)  Subjective functional status/changes:   [] No changes reported  Patient pleasant, reports no changes. OBJECTIVE      55 min Therapeutic Exercise:  [] See flow sheet :   Rationale: increase ROM, increase strength, improve coordination, improve balance and increase proprioception to improve the patients ability to restore PLOF          With   [x] TE   [] TA   [] neuro   [] other: Patient Education: [x] Review HEP    [] Progressed/Changed HEP based on:   [] positioning   [] body mechanics   [] transfers   [] heat/ice application    [] other:        Pain Level (0-10 scale) post treatment: 0/10    ASSESSMENT/Changes in Function: Patient tolerated treatment session well today. No complaints with added therex for UE strengthening. Patient is making good progress toward goals. Patient will continue to benefit from skilled PT services to modify and progress therapeutic interventions, address functional mobility deficits, address ROM deficits, address strength deficits, analyze and address soft tissue restrictions, analyze and cue movement patterns, analyze and modify body mechanics/ergonomics and assess and modify postural abnormalities to attain remaining goals.      [x]  See Plan of Care  []  See progress note/recertification  []  See Discharge Summary         Progress towards goals / Updated goals:  Short Term Goals: To be accomplished in 3 weeks:  1. Patient will be compliant with HEP to progress toward goals and restore functional mobility. Eval Status: issued this visit  Current: Patient still not compliant and is to discuss priority exercises for HEP with clinician next session. 8/22/19     2. Patient will improve FOTO score by 7 points to improve functional tolerance for exercise. Eval Status:FOTO 45  Current: 45 7/25/19     3. Pt will tolerate 10 minutes of standing therex during therapy session without seated rest break. Eval Status: pt able to tolerate 1 standing exercise prior to needing seated rest break  Current:Pt able to tolerate 1-2 standing exercises before rest break 7/29/19      Long Term Goals: To be accomplished in 6 weeks:  1. Patient will be independent with HEP to progress toward goals of prolonged ambulation with LRAD. Eval Status: issued at Eval  Current: Pt reports very little participation with HEP 7/29/19     2. Patient will improve FOTO score by 13 points to improve functional tolerance for prolonged ambulation of LRAD. Eval Status: FOTO 45  Current: 48 SLIGHT IMPROVEMENT 6-25-19        3. Patient will have 4+/5 bilateral LE strength to return to goals of prolonged ambulation. Eval Status:   Hip L (1-5) R (1-5) L  7/29/19 R      Hip Flexion 3+/5 3+/5 4/5 4/5   Hip Ext     4/5 4/5   Hip ABD 4/5 4/5 4+/5 4+/5   Hip ADD 4/5 4/5 4/5 4/5   Hip ER 3+/5 3+/5 4-/5 4-/5   Hip IR 3+/5 3+/5 4-/5 4-/5      Knee L (1-5) R (1-5) L  7/29/19 R  7/29/19   Knee Flexion 3+/5 3+/5 4+/5 4+/5   Knee Extension 3+/5 3+/5 4+/5 4+/5   Ankle PF           Ankle DF           Other                  Current: See Above 7/29/19         PLAN  [x]  Upgrade activities as tolerated     [x]  Continue plan of care  []  Update interventions per flow sheet       []  Discharge due to:_  []  Other:_      Cynthia Velarde, PT 8/26/2019  5:52 PM    No future appointments.

## 2019-08-27 ENCOUNTER — HOSPITAL ENCOUNTER (OUTPATIENT)
Dept: LAB | Age: 39
Discharge: HOME | End: 2019-08-27
Attending: INTERNAL MEDICINE
Payer: COMMERCIAL

## 2019-08-27 LAB
ALBUMIN SERPL-MCNC: 3 G/DL (ref 3.4–5)
ALBUMIN/GLOB SERPL: 0.7 {RATIO} (ref 0.8–1.7)
ALP SERPL-CCNC: 128 U/L (ref 45–117)
ALT SERPL-CCNC: 21 U/L (ref 16–61)
AMORPH CRY URNS QL MICRO: ABNORMAL
ANION GAP SERPL CALC-SCNC: 9 MMOL/L (ref 3–18)
APPEARANCE UR: CLEAR
AST SERPL-CCNC: 14 U/L (ref 10–38)
BACTERIA URNS QL MICRO: ABNORMAL /HPF
BASOPHILS # BLD: 0 K/UL (ref 0–0.1)
BASOPHILS NFR BLD: 0 % (ref 0–2)
BILIRUB SERPL-MCNC: 0.3 MG/DL (ref 0.2–1)
BILIRUB UR QL: NEGATIVE
BUN SERPL-MCNC: 40 MG/DL (ref 7–18)
BUN/CREAT SERPL: 14 (ref 12–20)
CALCIUM SERPL-MCNC: 9 MG/DL (ref 8.5–10.1)
CHLORIDE SERPL-SCNC: 104 MMOL/L (ref 100–111)
CO2 SERPL-SCNC: 26 MMOL/L (ref 21–32)
COLOR UR: YELLOW
CREAT SERPL-MCNC: 2.84 MG/DL (ref 0.6–1.3)
CREAT UR-MCNC: 164 MG/DL (ref 30–125)
CREAT UR-MCNC: 166 MG/DL (ref 30–125)
DIFFERENTIAL METHOD BLD: ABNORMAL
EOSINOPHIL # BLD: 0.3 K/UL (ref 0–0.4)
EOSINOPHIL NFR BLD: 3 % (ref 0–5)
EPITH CASTS URNS QL MICRO: ABNORMAL /LPF (ref 0–5)
ERYTHROCYTE [DISTWIDTH] IN BLOOD BY AUTOMATED COUNT: 15.1 % (ref 11.6–14.5)
FERRITIN SERPL-MCNC: 107 NG/ML (ref 8–388)
GLOBULIN SER CALC-MCNC: 4.5 G/DL (ref 2–4)
GLUCOSE SERPL-MCNC: 100 MG/DL (ref 74–99)
GLUCOSE UR STRIP.AUTO-MCNC: NEGATIVE MG/DL
HCT VFR BLD AUTO: 33.2 % (ref 36–48)
HGB BLD-MCNC: 10.3 G/DL (ref 13–16)
HGB UR QL STRIP: NEGATIVE
IRON SATN MFR SERPL: 14 %
IRON SERPL-MCNC: 36 UG/DL (ref 50–175)
KETONES UR QL STRIP.AUTO: NEGATIVE MG/DL
LEUKOCYTE ESTERASE UR QL STRIP.AUTO: NEGATIVE
LYMPHOCYTES # BLD: 2.8 K/UL (ref 0.9–3.6)
LYMPHOCYTES NFR BLD: 22 % (ref 21–52)
MAGNESIUM SERPL-MCNC: 1.9 MG/DL (ref 1.6–2.6)
MCH RBC QN AUTO: 24.3 PG (ref 24–34)
MCHC RBC AUTO-ENTMCNC: 31 G/DL (ref 31–37)
MCV RBC AUTO: 78.5 FL (ref 74–97)
MICROALBUMIN UR-MCNC: 50.8 MG/DL (ref 0–3)
MICROALBUMIN/CREAT UR-RTO: 306 MG/G (ref 0–30)
MONOCYTES # BLD: 0.6 K/UL (ref 0.05–1.2)
MONOCYTES NFR BLD: 5 % (ref 3–10)
NEUTS SEG # BLD: 9 K/UL (ref 1.8–8)
NEUTS SEG NFR BLD: 70 % (ref 40–73)
NITRITE UR QL STRIP.AUTO: NEGATIVE
PH UR STRIP: 5 [PH] (ref 5–8)
PHOSPHATE SERPL-MCNC: 5 MG/DL (ref 2.5–4.9)
PLATELET # BLD AUTO: 443 K/UL (ref 135–420)
PMV BLD AUTO: 8.6 FL (ref 9.2–11.8)
POTASSIUM SERPL-SCNC: 3.5 MMOL/L (ref 3.5–5.5)
PROT SERPL-MCNC: 7.5 G/DL (ref 6.4–8.2)
PROT UR STRIP-MCNC: 100 MG/DL
PROT UR-MCNC: 94 MG/DL
PROT/CREAT UR-RTO: 0.6
RBC # BLD AUTO: 4.23 M/UL (ref 4.7–5.5)
RBC #/AREA URNS HPF: ABNORMAL /HPF (ref 0–5)
SODIUM SERPL-SCNC: 139 MMOL/L (ref 136–145)
SP GR UR REFRACTOMETRY: 1.02 (ref 1–1.03)
TIBC SERPL-MCNC: 252 UG/DL (ref 250–450)
UROBILINOGEN UR QL STRIP.AUTO: 0.2 EU/DL (ref 0.2–1)
WBC # BLD AUTO: 12.8 K/UL (ref 4.6–13.2)
WBC URNS QL MICRO: ABNORMAL /HPF (ref 0–5)

## 2019-08-27 PROCEDURE — 84100 ASSAY OF PHOSPHORUS: CPT

## 2019-08-27 PROCEDURE — 85025 COMPLETE CBC W/AUTO DIFF WBC: CPT

## 2019-08-27 PROCEDURE — 81001 URINALYSIS AUTO W/SCOPE: CPT

## 2019-08-27 PROCEDURE — 82043 UR ALBUMIN QUANTITATIVE: CPT

## 2019-08-27 PROCEDURE — 83735 ASSAY OF MAGNESIUM: CPT

## 2019-08-27 PROCEDURE — 83540 ASSAY OF IRON: CPT

## 2019-08-27 PROCEDURE — 80053 COMPREHEN METABOLIC PANEL: CPT

## 2019-08-27 PROCEDURE — 82728 ASSAY OF FERRITIN: CPT

## 2019-08-27 PROCEDURE — 84156 ASSAY OF PROTEIN URINE: CPT

## 2019-08-27 PROCEDURE — 36415 COLL VENOUS BLD VENIPUNCTURE: CPT

## 2019-08-28 LAB
FAX TO INFO,FAXT: NORMAL
FAX TO NUMBER,FAXN: NORMAL

## 2019-08-29 ENCOUNTER — HOSPITAL ENCOUNTER (OUTPATIENT)
Dept: PHYSICAL THERAPY | Age: 39
Discharge: HOME | End: 2019-08-29
Payer: COMMERCIAL

## 2019-08-29 PROCEDURE — 97110 THERAPEUTIC EXERCISES: CPT

## 2019-09-17 ENCOUNTER — HOSPITAL ENCOUNTER (OUTPATIENT)
Dept: LAB | Age: 39
Discharge: HOME | End: 2019-09-17
Attending: INTERNAL MEDICINE
Payer: COMMERCIAL

## 2019-09-17 LAB
ANION GAP SERPL CALC-SCNC: 9 MMOL/L (ref 3–18)
BUN SERPL-MCNC: 19 MG/DL (ref 7–18)
BUN/CREAT SERPL: 9 (ref 12–20)
CALCIUM SERPL-MCNC: 8.6 MG/DL (ref 8.5–10.1)
CHLORIDE SERPL-SCNC: 106 MMOL/L (ref 100–111)
CK SERPL-CCNC: 83 U/L (ref 39–308)
CO2 SERPL-SCNC: 26 MMOL/L (ref 21–32)
CREAT SERPL-MCNC: 2.21 MG/DL (ref 0.6–1.3)
CREAT UR-MCNC: 184 MG/DL (ref 30–125)
GLUCOSE SERPL-MCNC: 88 MG/DL (ref 74–99)
MAGNESIUM SERPL-MCNC: 1.6 MG/DL (ref 1.6–2.6)
MICROALBUMIN UR-MCNC: 264 MG/DL (ref 0–3)
MICROALBUMIN/CREAT UR-RTO: 1435 MG/G (ref 0–30)
PHOSPHATE SERPL-MCNC: 4.1 MG/DL (ref 2.5–4.9)
POTASSIUM SERPL-SCNC: 4 MMOL/L (ref 3.5–5.5)
SODIUM SERPL-SCNC: 141 MMOL/L (ref 136–145)

## 2019-09-17 PROCEDURE — 82043 UR ALBUMIN QUANTITATIVE: CPT

## 2019-09-17 PROCEDURE — 80048 BASIC METABOLIC PNL TOTAL CA: CPT

## 2019-09-17 PROCEDURE — 83735 ASSAY OF MAGNESIUM: CPT

## 2019-09-17 PROCEDURE — 36415 COLL VENOUS BLD VENIPUNCTURE: CPT

## 2019-09-17 PROCEDURE — 84100 ASSAY OF PHOSPHORUS: CPT

## 2019-09-17 PROCEDURE — 82550 ASSAY OF CK (CPK): CPT

## 2019-09-18 LAB
FAX TO INFO,FAXT: NORMAL
FAX TO NUMBER,FAXN: NORMAL

## 2019-11-06 NOTE — PROGRESS NOTES
[facility] 
[address] [place], [address] Ph [phone]  Fx [phone] Physical Therapy Discharge Summary Patient name: Dede Carter Start of Care: 5/10/19 Referral source: Can Horan MD : 1980 Medical/Treatment Diagnosis: Unspecified abnormalities of gait and mobility [R26.9] Onset Date:8 Mos ago Prior Hospitalization: see medical history Provider#: [id number] Medications: Verified on Patient Summary List   
Comorbidities:  heart disease, fibromyalgia, depression, arthritis, thyroid problems, HTN, psoriatic arthritis, rheumatoid arthritis Prior Level of Function:functionally independent Visits from Start of Care: 21    Missed Visits: 5 Reporting Period : 5/10/19 to 19 Summary of Care: 
 
Unable to formally assess goals as pt failed to show for scheduled followup appts. Please DC to HEP at this time. Thank you for this referral. Pt will require new order if he/she requires further PT services. Short Term Goals: To be accomplished in 3 weeks: 1. Patient will be compliant with HEP to progress toward goals and restore functional mobility. Eval Status: issued this visit Current: Patient still not compliant and is to discuss priority exercises for HEP with clinician next session. 19 
  
2. Patient will improve FOTO score by 7 points to improve functional tolerance for exercise. Eval Status:FOTO 45 Current: 45 19 
  
3. Pt will tolerate 10 minutes of standing therex during therapy session without seated rest break. Eval Status: pt able to tolerate 1 standing exercise prior to needing seated rest break Current:Pt able to tolerate 1-2 standing exercises before rest break 19  
  
Long Term Goals: To be accomplished in 6 weeks: 1. Patient will be independent with HEP to progress toward goals of prolonged ambulation with LRAD. [facility] 
 Current: Pt reports very little participation with HEP 7/29/19 
  
2. Patient will improve FOTO score by 13 points to improve functional tolerance for prolonged ambulation of LRAD. Eval Status: FOTO 45 Current: 48 SLIGHT IMPROVEMENT 6-25-19 
  
  
3. Patient will have 4+/5 bilateral LE strength to return to goals of prolonged ambulation. Eval Status:  
Hip L (1-5) R (1-5) L 
7/29/19 R 
   
Hip Flexion 3+/5 3+/5 4/5 4/5 Hip Ext     4/5 4/5 Hip ABD 4/5 4/5 4+/5 4+/5 Hip ADD 4/5 4/5 4/5 4/5 Hip ER 3+/5 3+/5 4-/5 4-/5 Hip IR 3+/5 3+/5 4-/5 4-/5  
  
Knee L (1-5) R (1-5) L 
7/29/19 R 
7/29/19 Knee Flexion 3+/5 3+/5 4+/5 4+/5 Knee Extension 3+/5 3+/5 4+/5 4+/5 Ankle PF          
Ankle DF          
Other          
  
  
 Current: See Above 7/29/19 ASSESSMENT/RECOMMENDATIONS: 
[x]Discontinue therapy: []Patient has reached or is progressing toward set goals [x]Patient is non-compliant or has abdicated 
    []Due to lack of appreciable progress towards set goals Cynthia Velarde, PT 11/6/2019 3:15 PM

## 2019-11-11 ENCOUNTER — HOSPITAL ENCOUNTER (OUTPATIENT)
Dept: LAB | Age: 39
Discharge: HOME | End: 2019-11-11
Attending: INTERNAL MEDICINE
Payer: COMMERCIAL

## 2019-11-11 LAB
ALBUMIN SERPL-MCNC: 2.8 G/DL (ref 3.4–5)
ALBUMIN/GLOB SERPL: 0.7 {RATIO} (ref 0.8–1.7)
ALP SERPL-CCNC: 156 U/L (ref 45–117)
ALT SERPL-CCNC: 12 U/L (ref 16–61)
ANION GAP SERPL CALC-SCNC: 7 MMOL/L (ref 3–18)
AST SERPL-CCNC: 12 U/L (ref 10–38)
BASOPHILS # BLD: 0 K/UL (ref 0–0.1)
BASOPHILS NFR BLD: 0 % (ref 0–2)
BILIRUB DIRECT SERPL-MCNC: <0.1 MG/DL (ref 0–0.2)
BILIRUB SERPL-MCNC: 0.3 MG/DL (ref 0.2–1)
BUN SERPL-MCNC: 36 MG/DL (ref 7–18)
BUN/CREAT SERPL: 14 (ref 12–20)
CALCIUM SERPL-MCNC: 8.3 MG/DL (ref 8.5–10.1)
CHLORIDE SERPL-SCNC: 107 MMOL/L (ref 100–111)
CO2 SERPL-SCNC: 27 MMOL/L (ref 21–32)
CREAT SERPL-MCNC: 2.65 MG/DL (ref 0.6–1.3)
CREAT UR-MCNC: 112 MG/DL (ref 30–125)
CREAT UR-MCNC: 116 MG/DL (ref 30–125)
CRP SERPL-MCNC: 1.9 MG/DL (ref 0–0.3)
DIFFERENTIAL METHOD BLD: ABNORMAL
EOSINOPHIL # BLD: 0.5 K/UL (ref 0–0.4)
EOSINOPHIL NFR BLD: 5 % (ref 0–5)
ERYTHROCYTE [DISTWIDTH] IN BLOOD BY AUTOMATED COUNT: 16.1 % (ref 11.6–14.5)
ERYTHROCYTE [SEDIMENTATION RATE] IN BLOOD: 66 MM/HR (ref 0–15)
FERRITIN SERPL-MCNC: 55 NG/ML (ref 8–388)
GLOBULIN SER CALC-MCNC: 4.1 G/DL (ref 2–4)
GLUCOSE SERPL-MCNC: 84 MG/DL (ref 74–99)
HBV SURFACE AB SER QL IA: NEGATIVE
HBV SURFACE AB SERPL IA-ACNC: 6.6 MIU/ML
HBV SURFACE AG SER QL: <0.1 INDEX
HBV SURFACE AG SER QL: NEGATIVE
HCT VFR BLD AUTO: 36.6 % (ref 36–48)
HCV AB SER IA-ACNC: 0.12 INDEX
HCV AB SERPL QL IA: NEGATIVE
HCV COMMENT,HCGAC: NORMAL
HEP BS AB COMMENT,HBSAC: ABNORMAL
HGB BLD-MCNC: 11.4 G/DL (ref 13–16)
HIV 1+2 AB+HIV1 P24 AG SERPL QL IA: NONREACTIVE
HIV12 RESULT COMMENT, HHIVC: NORMAL
IRON SATN MFR SERPL: 14 %
IRON SERPL-MCNC: 35 UG/DL (ref 50–175)
LYMPHOCYTES # BLD: 2 K/UL (ref 0.9–3.6)
LYMPHOCYTES NFR BLD: 19 % (ref 21–52)
MCH RBC QN AUTO: 24.3 PG (ref 24–34)
MCHC RBC AUTO-ENTMCNC: 31.1 G/DL (ref 31–37)
MCV RBC AUTO: 77.9 FL (ref 74–97)
MICROALBUMIN UR-MCNC: 428 MG/DL (ref 0–3)
MICROALBUMIN/CREAT UR-RTO: 3690 MG/G (ref 0–30)
MONOCYTES # BLD: 0.6 K/UL (ref 0.05–1.2)
MONOCYTES NFR BLD: 5 % (ref 3–10)
NEUTS SEG # BLD: 7.7 K/UL (ref 1.8–8)
NEUTS SEG NFR BLD: 71 % (ref 40–73)
PLATELET # BLD AUTO: 336 K/UL (ref 135–420)
PMV BLD AUTO: 8.6 FL (ref 9.2–11.8)
POTASSIUM SERPL-SCNC: 4 MMOL/L (ref 3.5–5.5)
PROT SERPL-MCNC: 6.9 G/DL (ref 6.4–8.2)
PROT UR-MCNC: 524 MG/DL
PROT/CREAT UR-RTO: 4.7
RBC # BLD AUTO: 4.7 M/UL (ref 4.7–5.5)
SODIUM SERPL-SCNC: 141 MMOL/L (ref 136–145)
TIBC SERPL-MCNC: 247 UG/DL (ref 250–450)
WBC # BLD AUTO: 10.8 K/UL (ref 4.6–13.2)

## 2019-11-11 PROCEDURE — 86480 TB TEST CELL IMMUN MEASURE: CPT

## 2019-11-11 PROCEDURE — 80048 BASIC METABOLIC PNL TOTAL CA: CPT

## 2019-11-11 PROCEDURE — 86706 HEP B SURFACE ANTIBODY: CPT

## 2019-11-11 PROCEDURE — 82728 ASSAY OF FERRITIN: CPT

## 2019-11-11 PROCEDURE — 87340 HEPATITIS B SURFACE AG IA: CPT

## 2019-11-11 PROCEDURE — 84156 ASSAY OF PROTEIN URINE: CPT

## 2019-11-11 PROCEDURE — 86704 HEP B CORE ANTIBODY TOTAL: CPT

## 2019-11-11 PROCEDURE — 85652 RBC SED RATE AUTOMATED: CPT

## 2019-11-11 PROCEDURE — 80076 HEPATIC FUNCTION PANEL: CPT

## 2019-11-11 PROCEDURE — 87389 HIV-1 AG W/HIV-1&-2 AB AG IA: CPT

## 2019-11-11 PROCEDURE — 83540 ASSAY OF IRON: CPT

## 2019-11-11 PROCEDURE — 86140 C-REACTIVE PROTEIN: CPT

## 2019-11-11 PROCEDURE — 36415 COLL VENOUS BLD VENIPUNCTURE: CPT

## 2019-11-11 PROCEDURE — 85025 COMPLETE CBC W/AUTO DIFF WBC: CPT

## 2019-11-11 PROCEDURE — 86803 HEPATITIS C AB TEST: CPT

## 2019-11-11 PROCEDURE — 82043 UR ALBUMIN QUANTITATIVE: CPT

## 2019-11-12 LAB
HBV CORE AB SERPL QL IA: NEGATIVE
HBV CORE IGM SERPL QL IA: NEGATIVE

## 2019-11-14 LAB
M TB IFN-G BLD-IMP: NEGATIVE
QUANTIFERON CRITERIA, QFI1T: NORMAL
QUANTIFERON MITOGEN VALUE: >10 IU/ML
QUANTIFERON NIL VALUE: 0.04 IU/ML
QUANTIFERON TB1 AG: 0.04 IU/ML
QUANTIFERON TB2 AG: 0.04 IU/ML

## 2019-11-19 LAB
FAX TO INFO,FAXT: NORMAL
FAX TO INFO,FAXT: NORMAL
FAX TO NUMBER,FAXN: NORMAL
FAX TO NUMBER,FAXN: NORMAL

## 2019-12-05 ENCOUNTER — HOSPITAL ENCOUNTER (OUTPATIENT)
Dept: LAB | Age: 39
Discharge: HOME | End: 2019-12-05
Payer: COMMERCIAL

## 2019-12-05 PROCEDURE — 87186 SC STD MICRODIL/AGAR DIL: CPT

## 2019-12-05 PROCEDURE — 87070 CULTURE OTHR SPECIMN AEROBIC: CPT

## 2019-12-05 PROCEDURE — 87077 CULTURE AEROBIC IDENTIFY: CPT

## 2019-12-05 PROCEDURE — 36415 COLL VENOUS BLD VENIPUNCTURE: CPT

## 2019-12-08 LAB
BACTERIA SPEC CULT: ABNORMAL
GRAM STN SPEC: ABNORMAL
GRAM STN SPEC: ABNORMAL
SERVICE CMNT-IMP: ABNORMAL

## 2020-01-01 ENCOUNTER — HOSPITAL ENCOUNTER (OUTPATIENT)
Dept: LAB | Age: 40
Discharge: HOME | End: 2020-10-22
Payer: COMMERCIAL

## 2020-01-01 ENCOUNTER — CLINICAL SUPPORT (OUTPATIENT)
Dept: SURGERY | Age: 40
End: 2020-01-01

## 2020-01-01 ENCOUNTER — HOSPITAL ENCOUNTER (OUTPATIENT)
Dept: NUCLEAR MEDICINE | Age: 40
Discharge: HOME | End: 2020-12-16
Attending: SPECIALIST
Payer: COMMERCIAL

## 2020-01-01 ENCOUNTER — DOCUMENTATION ONLY (OUTPATIENT)
Dept: PULMONOLOGY | Age: 40
End: 2020-01-01

## 2020-01-01 ENCOUNTER — APPOINTMENT (OUTPATIENT)
Dept: GENERAL RADIOLOGY | Age: 40
End: 2020-01-01
Attending: SPECIALIST
Payer: COMMERCIAL

## 2020-01-01 ENCOUNTER — HOSPITAL ENCOUNTER (OUTPATIENT)
Age: 40
Setting detail: OUTPATIENT SURGERY
Discharge: HOME | End: 2020-11-04
Attending: SPECIALIST | Admitting: SPECIALIST
Payer: COMMERCIAL

## 2020-01-01 ENCOUNTER — OFFICE VISIT (OUTPATIENT)
Dept: SURGERY | Age: 40
End: 2020-01-01
Payer: COMMERCIAL

## 2020-01-01 VITALS
HEIGHT: 70 IN | BODY MASS INDEX: 45.1 KG/M2 | RESPIRATION RATE: 22 BRPM | SYSTOLIC BLOOD PRESSURE: 136 MMHG | WEIGHT: 315 LBS | OXYGEN SATURATION: 96 % | TEMPERATURE: 98 F | HEART RATE: 72 BPM | DIASTOLIC BLOOD PRESSURE: 77 MMHG

## 2020-01-01 VITALS
OXYGEN SATURATION: 96 % | BODY MASS INDEX: 45.1 KG/M2 | WEIGHT: 315 LBS | HEIGHT: 70 IN | SYSTOLIC BLOOD PRESSURE: 156 MMHG | HEART RATE: 88 BPM | RESPIRATION RATE: 16 BRPM | DIASTOLIC BLOOD PRESSURE: 86 MMHG

## 2020-01-01 VITALS — WEIGHT: 315 LBS | HEIGHT: 70 IN | BODY MASS INDEX: 45.1 KG/M2

## 2020-01-01 VITALS — HEIGHT: 70 IN | BODY MASS INDEX: 45.1 KG/M2 | WEIGHT: 315 LBS

## 2020-01-01 DIAGNOSIS — I25.10 CORONARY ARTERY DISEASE INVOLVING NATIVE CORONARY ARTERY OF NATIVE HEART WITHOUT ANGINA PECTORIS: ICD-10-CM

## 2020-01-01 DIAGNOSIS — E66.01 MORBIDLY OBESE (HCC): Primary | ICD-10-CM

## 2020-01-01 DIAGNOSIS — E66.01 MORBID OBESITY WITH BMI OF 45.0-49.9, ADULT (HCC): ICD-10-CM

## 2020-01-01 DIAGNOSIS — K21.9 GASTROESOPHAGEAL REFLUX DISEASE, UNSPECIFIED WHETHER ESOPHAGITIS PRESENT: ICD-10-CM

## 2020-01-01 DIAGNOSIS — G47.33 OSA (OBSTRUCTIVE SLEEP APNEA): ICD-10-CM

## 2020-01-01 DIAGNOSIS — E66.01 MORBID OBESITY (HCC): ICD-10-CM

## 2020-01-01 DIAGNOSIS — G47.33 OBSTRUCTIVE SLEEP APNEA: ICD-10-CM

## 2020-01-01 DIAGNOSIS — K31.84 GASTROPARESIS: ICD-10-CM

## 2020-01-01 DIAGNOSIS — E66.01 MORBIDLY OBESE (HCC): ICD-10-CM

## 2020-01-01 DIAGNOSIS — I10 HYPERTENSION, UNSPECIFIED TYPE: ICD-10-CM

## 2020-01-01 DIAGNOSIS — L40.50 PSORIATIC ARTHRITIS (HCC): ICD-10-CM

## 2020-01-01 DIAGNOSIS — K31.84 GASTROPARESIS: Primary | ICD-10-CM

## 2020-01-01 DIAGNOSIS — N18.30 STAGE 3 CHRONIC KIDNEY DISEASE (HCC): ICD-10-CM

## 2020-01-01 DIAGNOSIS — E78.5 HYPERLIPIDEMIA, UNSPECIFIED HYPERLIPIDEMIA TYPE: ICD-10-CM

## 2020-01-01 DIAGNOSIS — K21.9 GASTROESOPHAGEAL REFLUX DISEASE: ICD-10-CM

## 2020-01-01 LAB
ALBUMIN SERPL-MCNC: 2.7 G/DL (ref 3.4–5)
ALBUMIN/GLOB SERPL: 0.8 {RATIO} (ref 0.8–1.7)
ALP SERPL-CCNC: 101 U/L (ref 45–117)
ALT SERPL-CCNC: 20 U/L (ref 16–61)
ANION GAP SERPL CALC-SCNC: 6 MMOL/L (ref 3–18)
APPEARANCE UR: CLEAR
AST SERPL-CCNC: 11 U/L (ref 10–38)
BACTERIA URNS QL MICRO: ABNORMAL /HPF
BASOPHILS # BLD: 0 K/UL (ref 0–0.1)
BASOPHILS NFR BLD: 0 % (ref 0–2)
BILIRUB SERPL-MCNC: 0.2 MG/DL (ref 0.2–1)
BILIRUB UR QL: NEGATIVE
BUN SERPL-MCNC: 37 MG/DL (ref 7–18)
BUN/CREAT SERPL: 12 (ref 12–20)
CALCIUM SERPL-MCNC: 8.7 MG/DL (ref 8.5–10.1)
CHLORIDE SERPL-SCNC: 110 MMOL/L (ref 100–111)
CO2 SERPL-SCNC: 27 MMOL/L (ref 21–32)
COLOR UR: YELLOW
CREAT SERPL-MCNC: 3.18 MG/DL (ref 0.6–1.3)
CREAT UR-MCNC: 116 MG/DL (ref 30–125)
DIFFERENTIAL METHOD BLD: ABNORMAL
EOSINOPHIL # BLD: 0.1 K/UL (ref 0–0.4)
EOSINOPHIL NFR BLD: 1 % (ref 0–5)
EPITH CASTS URNS QL MICRO: ABNORMAL /LPF (ref 0–5)
ERYTHROCYTE [DISTWIDTH] IN BLOOD BY AUTOMATED COUNT: 14.5 % (ref 11.6–14.5)
FAX TO INFO,FAXT: NORMAL
FAX TO NUMBER,FAXN: NORMAL
GLOBULIN SER CALC-MCNC: 3.6 G/DL (ref 2–4)
GLUCOSE SERPL-MCNC: 92 MG/DL (ref 74–99)
GLUCOSE UR STRIP.AUTO-MCNC: 100 MG/DL
HCT VFR BLD AUTO: 43.6 % (ref 36–48)
HGB BLD-MCNC: 14 G/DL (ref 13–16)
HGB UR QL STRIP: ABNORMAL
KETONES UR QL STRIP.AUTO: NEGATIVE MG/DL
LEUKOCYTE ESTERASE UR QL STRIP.AUTO: NEGATIVE
LYMPHOCYTES # BLD: 3.8 K/UL (ref 0.9–3.6)
LYMPHOCYTES NFR BLD: 24 % (ref 21–52)
MAGNESIUM SERPL-MCNC: 2.2 MG/DL (ref 1.6–2.6)
MCH RBC QN AUTO: 28.3 PG (ref 24–34)
MCHC RBC AUTO-ENTMCNC: 32.1 G/DL (ref 31–37)
MCV RBC AUTO: 88.1 FL (ref 74–97)
MICROALBUMIN UR-MCNC: 570 MG/DL (ref 0–3)
MICROALBUMIN/CREAT UR-RTO: 4914 MG/G (ref 0–30)
MONOCYTES # BLD: 0.9 K/UL (ref 0.05–1.2)
MONOCYTES NFR BLD: 6 % (ref 3–10)
MUCOUS THREADS URNS QL MICRO: ABNORMAL /LPF
NEUTS SEG # BLD: 11.1 K/UL (ref 1.8–8)
NEUTS SEG NFR BLD: 69 % (ref 40–73)
NITRITE UR QL STRIP.AUTO: NEGATIVE
PH UR STRIP: 6 [PH] (ref 5–8)
PHOSPHATE SERPL-MCNC: 4.5 MG/DL (ref 2.5–4.9)
PLATELET # BLD AUTO: 402 K/UL (ref 135–420)
PMV BLD AUTO: 9 FL (ref 9.2–11.8)
POTASSIUM SERPL-SCNC: 4.7 MMOL/L (ref 3.5–5.5)
PROT SERPL-MCNC: 6.3 G/DL (ref 6.4–8.2)
PROT UR STRIP-MCNC: >1000 MG/DL
RBC # BLD AUTO: 4.95 M/UL (ref 4.7–5.5)
RBC #/AREA URNS HPF: ABNORMAL /HPF (ref 0–5)
SODIUM SERPL-SCNC: 143 MMOL/L (ref 136–145)
SP GR UR REFRACTOMETRY: 1.02 (ref 1–1.03)
UROBILINOGEN UR QL STRIP.AUTO: 0.2 EU/DL (ref 0.2–1)
WBC # BLD AUTO: 15.9 K/UL (ref 4.6–13.2)
WBC URNS QL MICRO: ABNORMAL /HPF (ref 0–5)

## 2020-01-01 PROCEDURE — 74240 X-RAY XM UPR GI TRC 1CNTRST: CPT

## 2020-01-01 PROCEDURE — 78264 GASTRIC EMPTYING IMG STUDY: CPT

## 2020-01-01 PROCEDURE — 76040000019: Performed by: SPECIALIST

## 2020-01-01 PROCEDURE — 77030040361 HC SLV COMPR DVT MDII -B: Performed by: SPECIALIST

## 2020-01-01 PROCEDURE — 81001 URINALYSIS AUTO W/SCOPE: CPT

## 2020-01-01 PROCEDURE — 82043 UR ALBUMIN QUANTITATIVE: CPT

## 2020-01-01 PROCEDURE — 83735 ASSAY OF MAGNESIUM: CPT

## 2020-01-01 PROCEDURE — 74011000255 HC RX REV CODE- 255: Performed by: SPECIALIST

## 2020-01-01 PROCEDURE — 85025 COMPLETE CBC W/AUTO DIFF WBC: CPT

## 2020-01-01 PROCEDURE — 2709999900 HC NON-CHARGEABLE SUPPLY: Performed by: SPECIALIST

## 2020-01-01 PROCEDURE — 80053 COMPREHEN METABOLIC PANEL: CPT

## 2020-01-01 PROCEDURE — 99215 OFFICE O/P EST HI 40 MIN: CPT | Performed by: SPECIALIST

## 2020-01-01 PROCEDURE — 36415 COLL VENOUS BLD VENIPUNCTURE: CPT

## 2020-01-01 PROCEDURE — 84100 ASSAY OF PHOSPHORUS: CPT

## 2020-01-05 ENCOUNTER — HOSPITAL ENCOUNTER (OUTPATIENT)
Dept: LAB | Age: 40
Discharge: HOME | End: 2020-01-05
Payer: COMMERCIAL

## 2020-01-05 LAB
ANION GAP SERPL CALC-SCNC: 7 MMOL/L (ref 3–18)
BASOPHILS # BLD: 0 K/UL (ref 0–0.1)
BASOPHILS NFR BLD: 0 % (ref 0–2)
BUN SERPL-MCNC: 28 MG/DL (ref 7–18)
BUN/CREAT SERPL: 9 (ref 12–20)
CALCIUM SERPL-MCNC: 8.7 MG/DL (ref 8.5–10.1)
CHLORIDE SERPL-SCNC: 109 MMOL/L (ref 100–111)
CO2 SERPL-SCNC: 23 MMOL/L (ref 21–32)
CREAT SERPL-MCNC: 3 MG/DL (ref 0.6–1.3)
CREAT UR-MCNC: 78 MG/DL (ref 30–125)
CREAT UR-MCNC: 79 MG/DL (ref 30–125)
DIFFERENTIAL METHOD BLD: ABNORMAL
EOSINOPHIL # BLD: 0.3 K/UL (ref 0–0.4)
EOSINOPHIL NFR BLD: 3 % (ref 0–5)
ERYTHROCYTE [DISTWIDTH] IN BLOOD BY AUTOMATED COUNT: 16.8 % (ref 11.6–14.5)
FSH SERPL-ACNC: 5.8 MIU/ML
GLUCOSE SERPL-MCNC: 86 MG/DL (ref 74–99)
HCT VFR BLD AUTO: 41.6 % (ref 36–48)
HGB BLD-MCNC: 13.1 G/DL (ref 13–16)
LH SERPL-ACNC: 2.4 MIU/ML
LYMPHOCYTES # BLD: 3 K/UL (ref 0.9–3.6)
LYMPHOCYTES NFR BLD: 35 % (ref 21–52)
MAGNESIUM SERPL-MCNC: 1.8 MG/DL (ref 1.6–2.6)
MCH RBC QN AUTO: 24.6 PG (ref 24–34)
MCHC RBC AUTO-ENTMCNC: 31.5 G/DL (ref 31–37)
MCV RBC AUTO: 78.2 FL (ref 74–97)
MICROALBUMIN UR-MCNC: 314 MG/DL (ref 0–3)
MICROALBUMIN/CREAT UR-RTO: 3975 MG/G (ref 0–30)
MONOCYTES # BLD: 0.5 K/UL (ref 0.05–1.2)
MONOCYTES NFR BLD: 6 % (ref 3–10)
NEUTS SEG # BLD: 4.8 K/UL (ref 1.8–8)
NEUTS SEG NFR BLD: 56 % (ref 40–73)
PHOSPHATE SERPL-MCNC: 3.5 MG/DL (ref 2.5–4.9)
PLATELET # BLD AUTO: 435 K/UL (ref 135–420)
PMV BLD AUTO: 8.8 FL (ref 9.2–11.8)
POTASSIUM SERPL-SCNC: 4 MMOL/L (ref 3.5–5.5)
PROLACTIN SERPL-MCNC: 203.3 NG/ML
PROT UR-MCNC: 407 MG/DL
PROT/CREAT UR-RTO: 5.2
RBC # BLD AUTO: 5.32 M/UL (ref 4.7–5.5)
SODIUM SERPL-SCNC: 139 MMOL/L (ref 136–145)
WBC # BLD AUTO: 8.6 K/UL (ref 4.6–13.2)

## 2020-01-05 PROCEDURE — 84402 ASSAY OF FREE TESTOSTERONE: CPT

## 2020-01-05 PROCEDURE — 83001 ASSAY OF GONADOTROPIN (FSH): CPT

## 2020-01-05 PROCEDURE — 80048 BASIC METABOLIC PNL TOTAL CA: CPT

## 2020-01-05 PROCEDURE — 83002 ASSAY OF GONADOTROPIN (LH): CPT

## 2020-01-05 PROCEDURE — 83735 ASSAY OF MAGNESIUM: CPT

## 2020-01-05 PROCEDURE — 84100 ASSAY OF PHOSPHORUS: CPT

## 2020-01-05 PROCEDURE — 82043 UR ALBUMIN QUANTITATIVE: CPT

## 2020-01-05 PROCEDURE — 85025 COMPLETE CBC W/AUTO DIFF WBC: CPT

## 2020-01-05 PROCEDURE — 36415 COLL VENOUS BLD VENIPUNCTURE: CPT

## 2020-01-05 PROCEDURE — 82670 ASSAY OF TOTAL ESTRADIOL: CPT

## 2020-01-05 PROCEDURE — 84156 ASSAY OF PROTEIN URINE: CPT

## 2020-01-05 PROCEDURE — 84146 ASSAY OF PROLACTIN: CPT

## 2020-01-07 LAB — ESTRADIOL SERPL-MCNC: 62.5 PG/ML (ref 7.6–42.6)

## 2020-01-08 LAB
FAX TO INFO,FAXT: NORMAL
FAX TO NUMBER,FAXN: NORMAL
TESTOST FREE SERPL-MCNC: 5.4 PG/ML (ref 6.8–21.5)
TESTOST SERPL-MCNC: 89 NG/DL (ref 264–916)

## 2020-01-23 ENCOUNTER — HOSPITAL ENCOUNTER (OUTPATIENT)
Dept: LAB | Age: 40
Discharge: HOME | End: 2020-01-23
Attending: INTERNAL MEDICINE
Payer: COMMERCIAL

## 2020-01-23 LAB
ALBUMIN SERPL-MCNC: 2.9 G/DL (ref 3.4–5)
ALBUMIN/GLOB SERPL: 0.8 {RATIO} (ref 0.8–1.7)
ALP SERPL-CCNC: 146 U/L (ref 45–117)
ALT SERPL-CCNC: 16 U/L (ref 16–61)
AST SERPL-CCNC: 9 U/L (ref 10–38)
BILIRUB DIRECT SERPL-MCNC: <0.1 MG/DL (ref 0–0.2)
BILIRUB SERPL-MCNC: 0.2 MG/DL (ref 0.2–1)
CHOLEST SERPL-MCNC: 211 MG/DL
GLOBULIN SER CALC-MCNC: 3.5 G/DL (ref 2–4)
HDLC SERPL-MCNC: 36 MG/DL (ref 40–60)
HDLC SERPL: 5.9 {RATIO} (ref 0–5)
LDLC SERPL CALC-MCNC: 132.8 MG/DL (ref 0–100)
LIPID PROFILE,FLP: ABNORMAL
PROT SERPL-MCNC: 6.4 G/DL (ref 6.4–8.2)
TRIGL SERPL-MCNC: 211 MG/DL (ref ?–150)
VLDLC SERPL CALC-MCNC: 42.2 MG/DL

## 2020-01-23 PROCEDURE — 80061 LIPID PANEL: CPT

## 2020-01-23 PROCEDURE — 80076 HEPATIC FUNCTION PANEL: CPT

## 2020-01-23 PROCEDURE — 36415 COLL VENOUS BLD VENIPUNCTURE: CPT

## 2020-01-29 ENCOUNTER — HOSPITAL ENCOUNTER (OUTPATIENT)
Dept: LAB | Age: 40
Discharge: HOME | End: 2020-01-29
Attending: UROLOGY
Payer: COMMERCIAL

## 2020-01-29 LAB
BUN SERPL-MCNC: 27 MG/DL (ref 7–18)
CREAT SERPL-MCNC: 2.54 MG/DL (ref 0.6–1.3)

## 2020-01-29 PROCEDURE — 84520 ASSAY OF UREA NITROGEN: CPT

## 2020-01-29 PROCEDURE — 82565 ASSAY OF CREATININE: CPT

## 2020-01-29 PROCEDURE — 36415 COLL VENOUS BLD VENIPUNCTURE: CPT

## 2020-03-13 ENCOUNTER — HOSPITAL ENCOUNTER (OUTPATIENT)
Dept: LAB | Age: 40
Discharge: HOME | End: 2020-03-13
Attending: INTERNAL MEDICINE
Payer: COMMERCIAL

## 2020-03-13 LAB
ANION GAP SERPL CALC-SCNC: 4 MMOL/L (ref 3–18)
APPEARANCE UR: CLEAR
BACTERIA URNS QL MICRO: ABNORMAL /HPF
BILIRUB UR QL: NEGATIVE
BUN SERPL-MCNC: 38 MG/DL (ref 7–18)
BUN/CREAT SERPL: 13 (ref 12–20)
CALCIUM SERPL-MCNC: 8.6 MG/DL (ref 8.5–10.1)
CHLORIDE SERPL-SCNC: 110 MMOL/L (ref 100–111)
CO2 SERPL-SCNC: 28 MMOL/L (ref 21–32)
COLOR UR: YELLOW
CREAT SERPL-MCNC: 2.93 MG/DL (ref 0.6–1.3)
CREAT UR-MCNC: 128 MG/DL (ref 30–125)
CREAT UR-MCNC: 129 MG/DL (ref 30–125)
CRP SERPL-MCNC: 0.6 MG/DL (ref 0–0.3)
ERYTHROCYTE [SEDIMENTATION RATE] IN BLOOD: 44 MM/HR (ref 0–15)
GLUCOSE SERPL-MCNC: 92 MG/DL (ref 74–99)
GLUCOSE UR STRIP.AUTO-MCNC: NEGATIVE MG/DL
HGB UR QL STRIP: ABNORMAL
KETONES UR QL STRIP.AUTO: NEGATIVE MG/DL
LEUKOCYTE ESTERASE UR QL STRIP.AUTO: NEGATIVE
MICROALBUMIN UR-MCNC: 379 MG/DL (ref 0–3)
MICROALBUMIN/CREAT UR-RTO: 2938 MG/G (ref 0–30)
MUCOUS THREADS URNS QL MICRO: ABNORMAL /LPF
NITRITE UR QL STRIP.AUTO: NEGATIVE
PH UR STRIP: 5.5 [PH] (ref 5–8)
POTASSIUM SERPL-SCNC: 4.2 MMOL/L (ref 3.5–5.5)
PROT UR STRIP-MCNC: 300 MG/DL
PROT UR-MCNC: 493 MG/DL
PROT/CREAT UR-RTO: 3.9
RBC #/AREA URNS HPF: ABNORMAL /HPF (ref 0–5)
SODIUM SERPL-SCNC: 142 MMOL/L (ref 136–145)
SP GR UR REFRACTOMETRY: 1.02 (ref 1–1.03)
UROBILINOGEN UR QL STRIP.AUTO: 0.2 EU/DL (ref 0.2–1)
WBC URNS QL MICRO: ABNORMAL /HPF (ref 0–5)

## 2020-03-13 PROCEDURE — 82043 UR ALBUMIN QUANTITATIVE: CPT

## 2020-03-13 PROCEDURE — 36415 COLL VENOUS BLD VENIPUNCTURE: CPT

## 2020-03-13 PROCEDURE — 80048 BASIC METABOLIC PNL TOTAL CA: CPT

## 2020-03-13 PROCEDURE — 86140 C-REACTIVE PROTEIN: CPT

## 2020-03-13 PROCEDURE — 85652 RBC SED RATE AUTOMATED: CPT

## 2020-03-13 PROCEDURE — 84156 ASSAY OF PROTEIN URINE: CPT

## 2020-03-13 PROCEDURE — 81001 URINALYSIS AUTO W/SCOPE: CPT

## 2020-03-16 LAB
FAX TO INFO,FAXT: NORMAL
FAX TO NUMBER,FAXN: NORMAL

## 2020-06-09 ENCOUNTER — HOSPITAL ENCOUNTER (OUTPATIENT)
Dept: LAB | Age: 40
Discharge: HOME | End: 2020-06-09
Attending: INTERNAL MEDICINE
Payer: COMMERCIAL

## 2020-06-09 LAB
ANION GAP SERPL CALC-SCNC: 7 MMOL/L (ref 3–18)
APPEARANCE UR: CLEAR
BACTERIA URNS QL MICRO: ABNORMAL /HPF
BILIRUB UR QL: NEGATIVE
BUN SERPL-MCNC: 45 MG/DL (ref 7–18)
BUN/CREAT SERPL: 15 (ref 12–20)
CALCIUM SERPL-MCNC: 8.2 MG/DL (ref 8.5–10.1)
CHLORIDE SERPL-SCNC: 107 MMOL/L (ref 100–111)
CO2 SERPL-SCNC: 26 MMOL/L (ref 21–32)
COLOR UR: YELLOW
CREAT SERPL-MCNC: 3.06 MG/DL (ref 0.6–1.3)
CREAT UR-MCNC: 75 MG/DL (ref 30–125)
CREAT UR-MCNC: 80 MG/DL (ref 30–125)
EPITH CASTS URNS QL MICRO: ABNORMAL /LPF (ref 0–5)
GLUCOSE SERPL-MCNC: 83 MG/DL (ref 74–99)
GLUCOSE UR STRIP.AUTO-MCNC: NEGATIVE MG/DL
HGB UR QL STRIP: ABNORMAL
KETONES UR QL STRIP.AUTO: NEGATIVE MG/DL
LEUKOCYTE ESTERASE UR QL STRIP.AUTO: NEGATIVE
MICROALBUMIN UR-MCNC: 412 MG/DL (ref 0–3)
MICROALBUMIN/CREAT UR-RTO: 5150 MG/G (ref 0–30)
MUCOUS THREADS URNS QL MICRO: ABNORMAL /LPF
NITRITE UR QL STRIP.AUTO: NEGATIVE
PH UR STRIP: 6.5 [PH] (ref 5–8)
POTASSIUM SERPL-SCNC: 4.6 MMOL/L (ref 3.5–5.5)
PROT UR STRIP-MCNC: >1000 MG/DL
PROT UR-MCNC: 541 MG/DL
PROT/CREAT UR-RTO: 7.2
RBC #/AREA URNS HPF: ABNORMAL /HPF (ref 0–5)
SODIUM SERPL-SCNC: 140 MMOL/L (ref 136–145)
SP GR UR REFRACTOMETRY: 1.02 (ref 1–1.03)
UROBILINOGEN UR QL STRIP.AUTO: 0.2 EU/DL (ref 0.2–1)
WBC URNS QL MICRO: ABNORMAL /HPF (ref 0–5)

## 2020-06-09 PROCEDURE — 36415 COLL VENOUS BLD VENIPUNCTURE: CPT

## 2020-06-09 PROCEDURE — 81001 URINALYSIS AUTO W/SCOPE: CPT

## 2020-06-09 PROCEDURE — 82043 UR ALBUMIN QUANTITATIVE: CPT

## 2020-06-09 PROCEDURE — 84156 ASSAY OF PROTEIN URINE: CPT

## 2020-06-09 PROCEDURE — 80048 BASIC METABOLIC PNL TOTAL CA: CPT

## 2020-06-10 LAB
FAX TO INFO,FAXT: NORMAL
FAX TO NUMBER,FAXN: NORMAL

## 2020-08-13 ENCOUNTER — HOSPITAL ENCOUNTER (OUTPATIENT)
Dept: LAB | Age: 40
Discharge: HOME | End: 2020-08-13
Payer: COMMERCIAL

## 2020-08-13 LAB
ALBUMIN SERPL-MCNC: 2.6 G/DL (ref 3.4–5)
ALBUMIN/GLOB SERPL: 0.7 {RATIO} (ref 0.8–1.7)
ALP SERPL-CCNC: 116 U/L (ref 45–117)
ALT SERPL-CCNC: 16 U/L (ref 16–61)
ANION GAP SERPL CALC-SCNC: 6 MMOL/L (ref 3–18)
APPEARANCE UR: CLEAR
AST SERPL-CCNC: 9 U/L (ref 10–38)
BACTERIA URNS QL MICRO: ABNORMAL /HPF
BASOPHILS # BLD: 0 K/UL (ref 0–0.1)
BASOPHILS NFR BLD: 0 % (ref 0–2)
BILIRUB SERPL-MCNC: 0.2 MG/DL (ref 0.2–1)
BILIRUB UR QL: NEGATIVE
BUN SERPL-MCNC: 36 MG/DL (ref 7–18)
BUN/CREAT SERPL: 11 (ref 12–20)
CALCIUM SERPL-MCNC: 8.6 MG/DL (ref 8.5–10.1)
CHLORIDE SERPL-SCNC: 110 MMOL/L (ref 100–111)
CHOLEST SERPL-MCNC: 217 MG/DL
CO2 SERPL-SCNC: 26 MMOL/L (ref 21–32)
COLOR UR: YELLOW
CREAT SERPL-MCNC: 3.33 MG/DL (ref 0.6–1.3)
CRP SERPL-MCNC: 1.5 MG/DL (ref 0–0.3)
DIFFERENTIAL METHOD BLD: ABNORMAL
EOSINOPHIL # BLD: 0.2 K/UL (ref 0–0.4)
EOSINOPHIL NFR BLD: 1 % (ref 0–5)
EPITH CASTS URNS QL MICRO: ABNORMAL /LPF (ref 0–5)
ERYTHROCYTE [DISTWIDTH] IN BLOOD BY AUTOMATED COUNT: 14.9 % (ref 11.6–14.5)
ERYTHROCYTE [SEDIMENTATION RATE] IN BLOOD: 55 MM/HR (ref 0–15)
GLOBULIN SER CALC-MCNC: 3.8 G/DL (ref 2–4)
GLUCOSE SERPL-MCNC: 93 MG/DL (ref 74–99)
GLUCOSE UR STRIP.AUTO-MCNC: 100 MG/DL
HCT VFR BLD AUTO: 42.7 % (ref 36–48)
HDLC SERPL-MCNC: 42 MG/DL (ref 40–60)
HDLC SERPL: 5.2 {RATIO} (ref 0–5)
HGB BLD-MCNC: 13.4 G/DL (ref 13–16)
HGB UR QL STRIP: ABNORMAL
KETONES UR QL STRIP.AUTO: NEGATIVE MG/DL
LDLC SERPL CALC-MCNC: 121 MG/DL (ref 0–100)
LEUKOCYTE ESTERASE UR QL STRIP.AUTO: NEGATIVE
LIPID PROFILE,FLP: ABNORMAL
LYMPHOCYTES # BLD: 2.9 K/UL (ref 0.9–3.6)
LYMPHOCYTES NFR BLD: 18 % (ref 21–52)
MAGNESIUM SERPL-MCNC: 2.2 MG/DL (ref 1.6–2.6)
MCH RBC QN AUTO: 27.2 PG (ref 24–34)
MCHC RBC AUTO-ENTMCNC: 31.4 G/DL (ref 31–37)
MCV RBC AUTO: 86.6 FL (ref 74–97)
MONOCYTES # BLD: 1.2 K/UL (ref 0.05–1.2)
MONOCYTES NFR BLD: 7 % (ref 3–10)
MUCOUS THREADS URNS QL MICRO: ABNORMAL /LPF
NEUTS SEG # BLD: 12.2 K/UL (ref 1.8–8)
NEUTS SEG NFR BLD: 74 % (ref 40–73)
NITRITE UR QL STRIP.AUTO: NEGATIVE
PH UR STRIP: 5.5 [PH] (ref 5–8)
PHOSPHATE SERPL-MCNC: 3.9 MG/DL (ref 2.5–4.9)
PLATELET # BLD AUTO: 406 K/UL (ref 135–420)
PMV BLD AUTO: 9 FL (ref 9.2–11.8)
POTASSIUM SERPL-SCNC: 4.7 MMOL/L (ref 3.5–5.5)
PROT SERPL-MCNC: 6.4 G/DL (ref 6.4–8.2)
PROT UR STRIP-MCNC: >1000 MG/DL
RBC # BLD AUTO: 4.93 M/UL (ref 4.7–5.5)
RBC #/AREA URNS HPF: ABNORMAL /HPF (ref 0–5)
SODIUM SERPL-SCNC: 142 MMOL/L (ref 136–145)
SP GR UR REFRACTOMETRY: 1.02 (ref 1–1.03)
TRIGL SERPL-MCNC: 270 MG/DL (ref ?–150)
UROBILINOGEN UR QL STRIP.AUTO: 0.2 EU/DL (ref 0.2–1)
VLDLC SERPL CALC-MCNC: 54 MG/DL
WBC # BLD AUTO: 16.5 K/UL (ref 4.6–13.2)
WBC URNS QL MICRO: ABNORMAL /HPF (ref 0–5)

## 2020-08-13 PROCEDURE — 85025 COMPLETE CBC W/AUTO DIFF WBC: CPT

## 2020-08-13 PROCEDURE — 85652 RBC SED RATE AUTOMATED: CPT

## 2020-08-13 PROCEDURE — 83735 ASSAY OF MAGNESIUM: CPT

## 2020-08-13 PROCEDURE — 81001 URINALYSIS AUTO W/SCOPE: CPT

## 2020-08-13 PROCEDURE — 80061 LIPID PANEL: CPT

## 2020-08-13 PROCEDURE — 80053 COMPREHEN METABOLIC PANEL: CPT

## 2020-08-13 PROCEDURE — 84100 ASSAY OF PHOSPHORUS: CPT

## 2020-08-13 PROCEDURE — 86140 C-REACTIVE PROTEIN: CPT

## 2020-08-13 PROCEDURE — 36415 COLL VENOUS BLD VENIPUNCTURE: CPT

## 2020-09-23 ENCOUNTER — VIRTUAL VISIT (OUTPATIENT)
Dept: SURGERY | Age: 40
End: 2020-09-23
Payer: COMMERCIAL

## 2020-09-23 VITALS — HEIGHT: 69 IN | BODY MASS INDEX: 46.65 KG/M2 | WEIGHT: 315 LBS

## 2020-09-23 DIAGNOSIS — N18.30 STAGE 3 CHRONIC KIDNEY DISEASE (HCC): ICD-10-CM

## 2020-09-23 DIAGNOSIS — G47.33 OSA (OBSTRUCTIVE SLEEP APNEA): ICD-10-CM

## 2020-09-23 DIAGNOSIS — E66.01 MORBIDLY OBESE (HCC): ICD-10-CM

## 2020-09-23 DIAGNOSIS — E78.5 HYPERLIPIDEMIA, UNSPECIFIED HYPERLIPIDEMIA TYPE: ICD-10-CM

## 2020-09-23 DIAGNOSIS — E66.01 MORBIDLY OBESE (HCC): Primary | ICD-10-CM

## 2020-09-23 DIAGNOSIS — E66.01 MORBID OBESITY WITH BMI OF 45.0-49.9, ADULT (HCC): ICD-10-CM

## 2020-09-23 DIAGNOSIS — I25.10 CORONARY ARTERY DISEASE INVOLVING NATIVE CORONARY ARTERY OF NATIVE HEART WITHOUT ANGINA PECTORIS: ICD-10-CM

## 2020-09-23 DIAGNOSIS — L40.50 PSORIATIC ARTHRITIS (HCC): ICD-10-CM

## 2020-09-23 DIAGNOSIS — K21.9 GASTROESOPHAGEAL REFLUX DISEASE, ESOPHAGITIS PRESENCE NOT SPECIFIED: ICD-10-CM

## 2020-09-23 PROCEDURE — 99205 OFFICE O/P NEW HI 60 MIN: CPT | Performed by: NURSE PRACTITIONER

## 2020-09-23 RX ORDER — LORAZEPAM 1 MG/1
1 TABLET ORAL
COMMUNITY

## 2020-09-23 RX ORDER — CLOPIDOGREL BISULFATE 75 MG/1
TABLET ORAL
COMMUNITY
Start: 2020-04-21

## 2020-09-23 RX ORDER — BUPROPION HYDROCHLORIDE 150 MG/1
150 TABLET ORAL DAILY
COMMUNITY
Start: 2020-06-27

## 2020-09-23 RX ORDER — ROSUVASTATIN CALCIUM 40 MG/1
40 TABLET, COATED ORAL DAILY
COMMUNITY
Start: 2020-01-28 | End: 2021-01-01

## 2020-09-23 NOTE — PROGRESS NOTES
Bariatric Surgery Consultation Subjective: The patient is a 41 y.o. obese male with a Body mass index is 48.73 kg/m². .  The patient is at his heaviest weight for the past several years. he has been overweight since his first marriage at age 22.   he has been considering surgery since past 4 months since nephrologi. he desires surgery at this time because of multiple health concerns and their lifestyle issues which are hindered by their weight. he has been referred by his nephrologist Dr Molly Delgado for evaluation and treatment of their obesity via surgical intervention. Buster Smoker has tried multiple diets in his lifetime most recently tried behavior modification and unsupervised diets Bariatric comorbidities present are Patient Active Problem List  
Diagnosis Code  Psoriatic arthritis ([facility] L40.50  HAWA (obstructive sleep apnea) G47.33  
 Morbidly obese (HCC) E66.01  
 HTN (hypertension) I10  
 Hypothyroidism E03.9  Stage 3 chronic kidney disease (HCC) N18.3  Acute blood loss anemia D62  
 GI bleed K92.2  FSGS (focal segmental glomerulosclerosis) N05.1  Hypertension I10  
 Hyperlipidemia E78.5  Chronic kidney disease N18.9  
 CAD (coronary artery disease) I25.10  Hypothyroid E03.9  Hiatal hernia K44.9  Obstructive sleep apnea G47.33  
 GERD (gastroesophageal reflux disease) K21.9  Morbid obesity with BMI of 45.0-49.9, adult (HCC) E66.01, Z68.42  
 
 
 The patient is considering laparoscopic sleeve gastrectomy for surgical weight loss due to their ineffective progress with medical forms of weight loss and the urging of their physician who cares for their primary medical issues. The patient  now presents  for consideration for weight loss surgery understanding the benefits of this over a medical approach of weight loss as was discussed in our presentation on weight loss surgery. They have discussed their plans both with their family and primary care physician who is in support of their pursuit of such. The patient has not had health issues as of late and denies and gastrointestinal disturbances other than what is outlined below in their review of symptoms. All of their prior evaluations available by both their PCP's and specialists physicians have been reviewed today either in the Care Everywhere portal or scanned under the media tab. I have spent a large portion of my initial consultation today reviewing the patients current dietary habits which have contributed to their health issues and obesity. I have suggested to them personally a dietary regimen that they can initiate now to help with their status as it pertains to their weight. They understand that the most important aspect of their journey through their weight loss endeavor will be their adherence to a new lifestyle of healthy eating behavior. They also understand that an adherence to an exercise program will not only help with weight loss but is ultimately important in weight maintenance. The patients goal weight is 220 lb. We discussed goal weight of BMI 28 which would be 189 lbs These goals are consistent with expected outcomes of their desired operation. his Medical goals are resolution of these health issues. Patient Active Problem List  
 Diagnosis Date Noted  Morbid obesity with BMI of 45.0-49.9, [facility] 09/23/2020  Hypertension  Hyperlipidemia  Chronic kidney disease  CAD (coronary artery disease)  Hypothyroid  Hiatal hernia  Obstructive sleep apnea  GERD (gastroesophageal reflux disease)  Acute blood loss anemia 2018  GI bleed 2018  FSGS (focal segmental glomerulosclerosis) 2018  HAWA (obstructive sleep apnea) 11/15/2018  Morbidly obese ([facility] 11/15/2018  
 HTN (hypertension) 11/15/2018  Hypothyroidism 11/15/2018  Stage 3 chronic kidney disease ([facility] 11/15/2018  [facility] Past Surgical History:  
Procedure Laterality Date  COLONOSCOPY N/A 2018 COLONOSCOPY; BIOPSY; APC OF BLEEDING ULCER; POLYPECTOMY performed by Radha King MD at [address] [place] [address]  HX TONSILLECTOMY Social History Tobacco Use  Smoking status: Former Smoker Packs/day: 1.00 Years: 18.00 Pack years: 18.00 Types: Cigarettes Last attempt to quit: 2008 Years since quittin.7  Smokeless tobacco: Never Used Substance Use Topics  Alcohol use: Yes Comment: 2 times a year Family History Problem Relation Age of Onset  Heart Attack Mother 36  
 Heart Attack Father 48  
 Heart Surgery Father 48 Current Outpatient Medications Medication Sig Dispense Refill  buPROPion XL (WELLBUTRIN XL) 150 mg tablet Take 150 mg by mouth daily.  clopidogreL (Plavix) 75 mg tab Take  by mouth.  aspirin-calcium carbonate 81 mg-300 mg calcium(777 mg) tab Take 81 mg by mouth daily.  LORazepam (ATIVAN) 1 mg tablet Take 1 mg by mouth every six (6) hours as needed.  rosuvastatin (CRESTOR) 40 mg tablet Take 40 mg by mouth daily.  metoclopramide HCl (REGLAN) 10 mg tablet Take 10 mg by mouth Before breakfast, lunch, and dinner. Indications: gastroesophageal reflux disease  SUCRALFATE PO Take 1 g by mouth four (4) times daily. Indications: stomach muscles  pantoprazole (PROTONIX) 40 mg tablet Take 40 mg by mouth daily. Indications: gastroesophageal reflux disease  sertraline (ZOLOFT) 100 mg tablet Take 100 mg by mouth daily. Indications: major depressive disorder  potassium chloride (KLOR-CON) 10 mEq tablet Take 10 mEq by mouth daily. Indications: low amount of potassium in the blood  loratadine (ALLERCLEAR) 10 mg tablet Take 10 mg by mouth two (2) times a day.  methocarbamol (ROBAXIN) 750 mg tablet Take 750 mg by mouth every six (6) hours as needed for Other (muscle spasm).  pantoprazole (PROTONIX) 40 mg tablet Take 1 Tab by mouth Before breakfast and dinner. 60 Tab 0  
 albuterol (PROAIR HFA) 90 mcg/actuation inhaler Take 2 Puffs by inhalation every six (6) hours as needed for Wheezing. Indications: Bronchospasm Prevention  metoprolol succinate (TOPROL XL) 100 mg tablet Take 50 mg by mouth two (2) times a day. Indications: high blood pressure  torsemide (DEMADEX) 100 mg tablet Take 100 mg by mouth daily.  levothyroxine (SYNTHROID) 150 mcg tablet Take 125 mcg by mouth nightly. Indications: hypothyroidism  co-enzyme Q-10 (CO Q-10) 100 mg capsule Take 200 mg by mouth daily (with dinner). Indications: mineral supplement  latanoprost (XALATAN) 0.005 % ophthalmic solution Administer 1 Drop to both eyes nightly. Indications: increased pressure in the eye  5  predniSONE (DELTASONE) 20 mg tablet Take 10 mg by mouth every other day. taking 1/2 a tab or 5 mg tabs. Indications: inflammation of the nose due to an allergy  loperamide (IMMODIUM) 2 mg tablet Take 4 mg by mouth every twelve (12) hours every twelve (12) hours for Diarrhea. 3am and 3pm with cellcept dose  Indications: diarrhea  magnesium oxide (MAG-OX) 400 mg tablet Take 400 mg by mouth daily (with lunch). Indications: low amount of magnesium in the blood Allergies Allergen Reactions  Animal Dander Rash Review of Systems: General - No history or complaints of unexpected fever, chills, or weight loss Head/Neck - No history or complaints of headache, diplopia, dysphagia, hearing loss Cardiac - No history or complaints of chest pain, palpitations, murmur, or shortness of breath Pulmonary - No history or complaints of shortness of breath, productive cough, hemoptysis Gastrointestinal - has reflux on PPI,no  abdominal pain, obstipation/constipation or blood per rectum Genitourinary - No history or complaints of hematuria/dysuria, stress urinary incontinence symptoms, or renal lithiasis Musculoskeletal - no joint pain as long as he is taking his psoriatic arthritis medications,  no muscular weakness Hematologic - No history or complaints of bleeding disorders,  No blood transfusions Neurologic - No history or complaints of  migraine headaches, seizure activity, syncopal episodes, TIA or stroke Integumentary - No history or complaints of rashes, abnormal nevi, skin cancer Objective:  
 
Visit Vitals  5' 9\" (1.753 m) Wt 149.7 kg (330 lb) BMI 48.73 kg/m² Physical Examination:  
General appearance - well appearing and in no distress Mental status - alert, oriented to person, place, and time Pulmonary - normal respiratory effort Abdomen - no obvious distention Neurological - normal speech, no focal findings or movement disorder noted Extremities - normal movement Musculoskeletal - moving extremities without difficulty Skin - no rashes, no suspicious skin lesions noted Labs:  
 
 
Recent Results (from the past 1440 hour(s)) C REACTIVE PROTEIN, QT Collection Time: 08/13/20  7:40 AM  
Result Value Ref Range C-Reactive protein 1.5 (H) 0 - 0.3 mg/dL SED RATE (ESR) Collection Time: 08/13/20  7:40 AM  
Result Value Ref Range Sed rate, automated 55 (H) 0 - 15 mm/hr LIPID PANEL Collection Time: 08/13/20  7:41 AM  
Result Value Ref Range  LIPID PROFILE        
 Cholesterol, total 217 (H) <200 MG/DL Triglyceride 270 (H) <150 MG/DL  
 HDL Cholesterol 42 40 - 60 MG/DL  
 LDL, calculated 121 (H) 0 - 100 MG/DL VLDL, calculated 54 MG/DL  
 CHOL/HDL Ratio 5.2 (H) 0 - 5.0    
CBC WITH AUTOMATED DIFF Collection Time: 08/13/20  7:42 AM  
Result Value Ref Range WBC 16.5 (H) 4.6 - 13.2 K/uL  
 RBC 4.93 4.70 - 5.50 M/uL  
 HGB 13.4 13.0 - 16.0 g/dL HCT 42.7 36.0 - 48.0 % MCV 86.6 74.0 - 97.0 FL  
 MCH 27.2 24.0 - 34.0 PG  
 MCHC 31.4 31.0 - 37.0 g/dL  
 RDW 14.9 (H) 11.6 - 14.5 % PLATELET 780 275 - 522 K/uL MPV 9.0 (L) 9.2 - 11.8 FL  
 NEUTROPHILS 74 (H) 40 - 73 % LYMPHOCYTES 18 (L) 21 - 52 % MONOCYTES 7 3 - 10 % EOSINOPHILS 1 0 - 5 % BASOPHILS 0 0 - 2 %  
 ABS. NEUTROPHILS 12.2 (H) 1.8 - 8.0 K/UL  
 ABS. LYMPHOCYTES 2.9 0.9 - 3.6 K/UL  
 ABS. MONOCYTES 1.2 0.05 - 1.2 K/UL  
 ABS. EOSINOPHILS 0.2 0.0 - 0.4 K/UL  
 ABS. BASOPHILS 0.0 0.0 - 0.1 K/UL  
 DF AUTOMATED MAGNESIUM Collection Time: 08/13/20  7:42 AM  
Result Value Ref Range Magnesium 2.2 1.6 - 2.6 mg/dL METABOLIC PANEL, COMPREHENSIVE Collection Time: 08/13/20  7:42 AM  
Result Value Ref Range Sodium 142 136 - 145 mmol/L Potassium 4.7 3.5 - 5.5 mmol/L Chloride 110 100 - 111 mmol/L  
 CO2 26 21 - 32 mmol/L Anion gap 6 3.0 - 18 mmol/L Glucose 93 74 - 99 mg/dL BUN 36 (H) 7.0 - 18 MG/DL Creatinine 3.33 (H) 0.6 - 1.3 MG/DL  
 BUN/Creatinine ratio 11 (L) 12 - 20 GFR est AA 25 (L) >60 ml/min/1.73m2 GFR est non-AA 20 (L) >60 ml/min/1.73m2 Calcium 8.6 8.5 - 10.1 MG/DL Bilirubin, total 0.2 0.2 - 1.0 MG/DL  
 ALT (SGPT) 16 16 - 61 U/L  
 AST (SGOT) 9 (L) 10 - 38 U/L Alk. phosphatase 116 45 - 117 U/L Protein, total 6.4 6.4 - 8.2 g/dL Albumin 2.6 (L) 3.4 - 5.0 g/dL Globulin 3.8 2.0 - 4.0 g/dL A-G Ratio 0.7 (L) 0.8 - 1.7 PHOSPHORUS Collection Time: 08/13/20  7:42 AM  
Result Value Ref Range  Phosphorus 3.9 2.5 - 4.9 MG/DL  
 URINALYSIS W/MICROSCOPIC Collection Time: 08/13/20  7:42 AM  
Result Value Ref Range Color YELLOW Appearance CLEAR Specific gravity 1.018 1.005 - 1.030    
 pH (UA) 5.5 5.0 - 8.0 Protein >1,000 (A) NEG mg/dL Glucose 100 (A) NEG mg/dL Ketone Negative NEG mg/dL Bilirubin Negative NEG Blood MODERATE (A) NEG Urobilinogen 0.2 0.2 - 1.0 EU/dL Nitrites Negative NEG Leukocyte Esterase Negative NEG    
 WBC 1 to 2 0 - 5 /hpf  
 RBC 2 to 4 0 - 5 /hpf Epithelial cells FEW 0 - 5 /lpf Bacteria 1+ (A) NEG /hpf Mucus 2+ (A) NEG /lpf Reviewed Care Everywhere for multiple imaging and lab results TSH from 3/1/2019 was 0.02 and free T4 2.0 
 
TTE 3/16/2019 TECHNICALLY DIFFICULT STUDY. UNABLE TO ASSESS REGIONAL WALL MOTION WELL. GROSSLY NORMAL LEFT VENTRICULAR SYSTOLIC FUNCTION. NORMAL DIASTOLIC FUNCTION. EJECTION FRACTION IS 60% BY VISUAL ESTIMATION. NO HEMODYNAMICALLY SIGNIFICANT VALVULAR PATHOLOGY. NORMAL INFERIOR VENA CAVA WITH NORMAL COLLAPSE ON INSPIRATION. UNABLE TO ASSESS PULMONARY ARTERY PRESSURE. NO PREVIOUS REPORT FOR COMPARISON. ABD CT with contrast 3/15/2019 1. Evidence of hepatic steatosis. No focal hepatic lesions are identified. 2. The bowel does not appear obstructed. A normal appendix is identified. 3. Mild diffuse anasarca. Trace mesenteric edema is identified. 4. Additional chronic changes are detailed above. Gastric Empyting Study from 3/18/2019 The images demonstrate a normal configuration of the visualized stomach and bowel. The time activity curve demonstrates approximately 11 percent gastric emptying at 26 minutes; 22 percent gastric emptying at 56 minutes; 30 percent gastric emptying at 118 minutes; and 84 percent gastric emptying at 225 minutes. (Normal values 10-63% at one hour, 40-70% at 2 hours, and % at 4 hours.) IMPRESSION Mildly delayed gastric emptying, as detailed above. Assessment: Morbid obesity with comorbidity Plan:  
 
laparoscopic sleeve gastrectomy vs laparoscopic gastric bypass This is a 41 y.o. male with a BMI of Body mass index is 48.73 kg/m². and the weight-related co-morbidties as noted below. Tanika Braxton meets the NIH criteria for bariatric surgery based upon the BMI of Body mass index is 48.73 kg/m². and multiple weight-related co-morbidties. Tanika Braxton has elected laparoscopic sleeve gastrectomy as his intervention of choice for treatment of morbid obestiy through surgical means secondary to its safety profile, rapid return to work  and decreases in operative risks over gastric bypass. In the office today, following Shiva's history and physical examination, a 30 minute discussion regarding the anatomic alterations for the laparoscopic sleeve gastrectomy was undertaken. The dietary expectations and the patient and physician dependent factors for success were thoroughly discussed, to include the need for interval follow-up and long-term dietary changes associated with success. The possible complications of the sleeve gastrectomy  were also discussed, to include;death, DVT/PE, staple line leak, bleeding, stricture formation, infection, nutritional deficiencies and sleeve dilation. Specific weight related outcomes for success were also discussed with an emphasis on careful and close follow-up with the first year and eating behavior modification as the baseline and cyclical hunger return. The patient expressed an understanding of the above factors, and his questions were answered in their entirety. In addition, the patient watched a 1.5 hour power point seminar regarding obesity, surgical weight loss including, adjustable gastric band, gastric bypass, and sleeve gastrectomy. This discussion contrasted the different surgical techniques, mechanisms of actions and expected outcomes, and surgical and medical risks associated with each procedure. Today, the patient had all of his questions answered and desires to proceed with  bariatric surgery initially choosing sleeve gastrectomy as his surgical option. Although the patient is interested in pursuing the sleeve gastrectomy, we discussed the need to obtain UGI to determine the amount of reflux and gastroparesis that he has. This may ultimately determine that the gastric bypass to be the preferred surgery. Secondary Diagnoses:  
 
Dietary Intervention  - The patient is currently scheduled to see or has been followed by a bariatric nutritionist for an attempt at preoperative weight loss as has been dictated by their insurance carrier. They will be assessed at various times during their follow up to evaluate their progress depending on the length of time that is required once again by their carrier. I have explained the importance of preoperative weight loss and the benefits regarding lower surgical risk and also assisting the patient in reaching their weight loss goal.  Finally they understand there is a physiologic benefit from the standpoint of hepatic volume reduction and reduction of central visceral adiposity preoperatively. I have reiterated the importance of a low carbohydrate and high protein regimen to achieve their stated goal. I have reviewed their current eating behavior prior to this encounter and explained to them in an exhaustive fashion the appropriate diet that they should adhere to. They have been encouraged to loose weight pre operatively and understand it is our prerogative to cancel surgery or postpone their procedure in the event of significant weight gain. The patients weight loss goal pre operatively is 10-20 pounds. Coronary Artery Disease - The patient has undergone or will undergo a preoperative evaluation by a cardiologist such that they are deemed a reasonable candidate for surgery. The patient understands that with a history of cardiac disease that there is always an increased risk compared to the average patient. Appropriate recommendations have been followed as recommended by the cardiologist.  The patients ASA will be resumed approximately 1 month postoperatively in a coated form. Patient will obtain cardiac clearance from Dr Raul Medina Obstructive Sleep Apnea -The patient understands the association of sleep apnea and obesity and the additional risk that it caries related to post surgical complications. If they have not been tested for sleep apnea and I feel they are at increased risk for this diagnosis, then they will be scheduled for a consultation with a Pulmonologist for such. In the event that they jonathan this diagnosis we will have the patient bring their CPAP machine to the hospital for use both postoperatively in the PACU and on the floor at its appropriate setting.  We will have them continue using it while at home after surgery and follow up with their pulmonologist 6 months after to be retested to see if it can be discontinued at that time period. Will place referral to pulmonology for updated sleep study and likely need for CPAP machine prior to surgery GERD -The patient understands that weight loss surgery is not a guaranteed cure for reflux disease but does understand the benefits that weight loss can have on reflux disease. They also understand that at the time of surgery the gastroesophageal junction will be evaluated for the presence of a diaphragmatic hernia. Hernias will be corrected always with the gastric band and sleeve gastrectomy procedures, but only on a case by case basis with the gastric bypass. The patient also understands that neither weight loss surgery nor repair of a diaphragmatic hernia repair guarantees the complete cessation of the disease. This visit with Mr Waleska Sifuentes was performed under virtual telemedicine guidelines during the coronavirus (VQBIZ-09) public health emergency on 9/23/20 in an interactive fashion using [employer] me. They understand that this telemedicine encounter is a billable service, with coverage determined by their insurance carrier. They are aware that they may receive a bill and have provided verbal consent for this virtual visit. This visit was performed with the patient in their home environment and provider was present at [facility]. I have spent over 30 minutes on this visit  both prior to the visit reviewing the patients chart and with the patient face to face. I have reviewed their medical history, performed a telemedicine physical examination, and discussed the plan of action to date. They understand that they will be asked to come to the office when our office is allowed normal patient interaction, as dictated by public health officials, for a face-to-face visit to rediscuss all of the things we have talked about today. Signed By: Russell Inman NP September 23, 2020

## 2020-09-23 NOTE — PROGRESS NOTES
Pt is a New Patient. He has not watched the seminar yet. Pt stated that he wants to have surgery with Dr Kylie Dooley. Pt has a virtual visit with Jeremiah De Leon today

## 2020-09-23 NOTE — PATIENT INSTRUCTIONS
New patient Instructions 1. Ensure all pre-operative insurances requirements are complete (ie; dietary visits, psychology consults, primary care documentation, etc) 2. Adhere to pre-operative weight loss / weight maintenance plan discussed in the office today. 3. Contact the office with any questions on pre-operative clearance issues (ie; cardiology work-up, pulmonary work-up, upper GI study, etc). 4. If a barium upper GI study has been ordered for your evaluation, make sure you are on liquids only the morning of the procedure. Learning About Physical Activity What is physical activity? Physical activity is any kind of activity that gets your body moving. The types of physical activity that can help you get fit and stay healthy include: · Aerobic or \"cardio\" activities that make your heart beat faster and make you breathe harder, such as brisk walking, riding a bike, or running. Aerobic activities strengthen your heart and lungs and build up your endurance. · Strength training activities that make your muscles work against, or \"resist,\" something, such as lifting weights or doing push-ups. These activities help tone and strengthen your muscles. · Stretches that allow you to move your joints and muscles through their full range of motion. Stretching helps you be more flexible and avoid injury. What are the benefits of physical activity? Being active is one of the best things you can do for your health. It helps you to: · Feel stronger and have more energy to do all the things you like to do. · Focus better at school or work. · Feel, think, and sleep better. · Reach and stay at a healthy weight. · Lose fat and build lean muscle. · Lower your risk for serious health problems. · Keep your bones, muscles, and joints strong. How can you make physical activity part of your life? Get at least 30 minutes of exercise on most days of the week.  Walking is a good choice. You also may want to do other activities, such as running, swimming, cycling, or playing tennis or team sports. Pick activities that you likeones that make your heart beat faster, your muscles stronger, and your muscles and joints more flexible. If you find more than one thing you like doing, do them all. You don't have to do the same thing every day. Get your heart pumping every day. Any activity that makes your heart beat faster and keeps it at that rate for a while counts. Here are some great ways to get your heart beating faster: · Go for a brisk walk, run, or bike ride. · Go for a hike or swim. · Go in-line skating. · Play a game of touch football, basketball, or soccer. · Ride a bike. · Play tennis or racquetball. · Climb stairs. Even some household chores can be aerobicjust do them at a faster pace. Vacuuming, raking or mowing the lawn, sweeping the garage, and washing and waxing the car all can help get your heart rate up. Strengthen your muscles during the week. You don't have to lift heavy weights or grow big, bulky muscles to get stronger. Doing a few simple activities that make your muscles work against, or \"resist,\" something can help you get stronger. For example, you can: · Do push-ups or sit-ups, which use your own body weight as resistance. · Lift weights or dumbbells or use stretch bands at home or in a gym or community center. Stretch your muscles often. Stretching will help you as you become more active. It can help you stay flexible, loosen tight muscles, and avoid injury. It can also help improve your balance and posture and can be a great way to relax. Be sure to stretch the muscles you'll be using when you work out. It's best to warm your muscles slightly before you stretch them. Walk or do some other light aerobic activity for a few minutes, and then start stretching. When you stretch your muscles: · Do it slowly. Stretching is not about going fast or making sudden movements. · Don't push or bounce during a stretch. · Hold each stretch for at least 15 to 30 seconds, if you can. You should feel a stretch in the muscle, but not pain. · Breathe out as you do the stretch. Then breathe in as you hold the stretch. Don't hold your breath. If you're worried about how more activity might affect your health, have a checkup before you start. Follow any special advice your doctor gives you for getting a smart start. Where can you learn more? Go to http://www.gray.com/ Enter Z479 in the search box to learn more about \"Learning About Physical Activity. \" Current as of: January 16, 2020               Content Version: 12.6 © 7045-8750 [employer]. Care instructions adapted under license by [email or web address] (which disclaims liability or warranty for this information). If you have questions about a medical condition or this instruction, always ask your healthcare professional. [address] any warranty or liability for your use of this information.

## 2020-11-04 NOTE — PROCEDURES
Patient:Shiva Corea : 1980 Medical Record [facility]:[id number] PREPROCEDURE DIAGNOSIS: This patient is preoperative for laparoscopic sleeve gastrectomyprocedure with a history of  reflux disease. POSTPROCEDURE DIAGNOSIS: This patient is preoperative for laparoscopic sleeve gastrectomyprocedure with a history of  reflux disease. PROCEDURES PERFORMED: Upper GI study with barium. ESTIMATED BLOOD LOSS: None. SPECIMENS: None. STATEMENT OF MEDICAL NECESSITY: The patient is a patient with a 
longstanding history of obesity. They are now considering the laparoscopic sleeve gastrectomyprocedure as a means of surgical weight control and due to their history of reflux disease and are being assessed preoperatively for such. DESCRIPTION OF PROCEDURE: The patient was brought to the fluoroscopy unit and 
was given thin barium. On swallowing of barium, they were noted to have 
normal peristalsis of their esophagus. They had prompt filling of distal 
esophagus with tapering into the gastroesophageal junction. There was no evidence of a hiatal hernia present. Contrast then filled the gastric cardia, fundus,body and pre pyloric region with no abnormalities noted. Contrast then exited the pylorus in normal fashion. No obstruction was noted. There was no evidence of reflux noted. (normal anatomy (but with abnormal GES in the past) - pt with very complex history - needs multiple clearances prior to possible surgery) Lois Mitchell MD

## 2020-11-09 NOTE — PROGRESS NOTES
Medical Weight Loss Multi-Disciplinary Program 
 
Name: Thomas Barbosa   : 1980 Session# 1 Date: 2020 Height: 5' 10\" (177.8 cm)    Weight: 158.3 kg (349 lb) lbs. Body mass index is 50.08 kg/m².  
-10-20 lbs Dietitian: Wale Blum Thomas Barbosa is a 41 y.o. male who present for a pre-op evaluation. Visit Vitals Ht 5' 10\" (1.778 m) Wt 158.3 kg (349 lb) BMI 50.08 kg/m² Past Medical History:  
Diagnosis Date  ADD (attention deficit disorder)  CAD (coronary artery disease) stent to distal RCA 2019, Dr Yusra Diggs  Chest tightness 2011  Chronic kidney disease Crt 3.33 2020, followed by Dr Sandee Baum  Fatigue  Gastroparesis   
 gastric emptying study 2019  GERD (gastroesophageal reflux disease)   
 on PPI  Hiatal hernia  Hx of ulcer disease   
 chronic prepyloric ulcer, bleeding ascending colon ulcer   Hyperlipidemia  Hypertension  Hypothyroid  Morbid obesity ([address]  Obstructive sleep apnea   
 not using CPAP  
 Palpitation 2011  Psoriatic arthritis ([address] fingers, knees, ankles  Psychiatric disorder   
 anxiety  SOB (shortness of breath) on exertion 2011 Procedure: 
laparoscopic sleeve gastrectomy Reasons for Surgery: 
GERD, BMI > 40 with one or more medically significant comorbidities and HTN Summary: 
Pt given brief pre/post-op diet ed and diet hx reviewed. Pt instructed to follow a low calorie, low carbohydrate, high protein diet of about 9136-4804 calories daily. Pt set several goals. See below. What have you done in the past to try to lose weight? Calorie controlled diet, exercise programs, low carbohydrate diet, meal replacement bars and shakes Why didn't you lose weight or keep the weight off?: Patient has not been successful with dietary changes in the past on his own Why do you think having weight loss surgery will make it possible for you to lose weight and keep it off? Patient hopes to lose weight to assist with goal of kindey transplants Dietary Instructions Nutritional Hx: What is the number of meals you eat per day? 3 Comment: Do you eat between meals / snack? yes Typical snack: chips How fast do you eat your meals? rapid How often do you eat fast food? 3 times a week How many sodas/sugared beverages do you drink per day? >30 oz How many caffeinated drinks do you have per day? Sweet tea How much milk and/or juice do you drink per day? Rarely has juice, occasionally milk with cereal  
 
How much water do you drink per day? 3 (8oz glasses) How often do you consume alcohol? None Current Vitamins: Magnesium, CoQ10, KCl Diet History: 
 
Typical intake is as follows: 
Breakfast: 2 blueberry waffles with peanut butter - and Sweet tea (17 oz bottle) Lunch: Pizza - Leftovers (3 slices of pizza - chicken bbq) with 3 garlic knots Dinner:  Chicken, broccoli and cheese - hot pocket (consumed 3) Snacks: Chips, sweet tea Fluids: Sweet tea (17 oz bottle - 3-4 bottles per day), Hint water -2 bottles per day Reviewed intake Understanding low carbohydrates, low sugar, higher protein meals Understanding proper portions Dining outside home Instruction given for personal dietary changes Discussed perceived compliance Comments: Pt given brief pre/post-op diet ed and diet hx reviewed. Patient Education and Materials Provided: 
Supplement [facility], B Vitamin Information, MVI Recommendations, Calcium Citrate Information, Bariatric Supplement Companies, Protein Supplement Information, Fluid Requirements, No Caffeine or Carbonation, No Alcohol for One Year Post Op, 3 Balanced Meals a Day, Food Group Guide, Good Choices Dining Out, No Snacks, No Concentrated Sweets, Support System at Home, Exercising, Support Group Information and Addressed Current Habits / Changes to make Physical Activity/Exercise Discussed Perceived Compliance Reasonable Goals Set Motivation Comments: none Exercise: Do you currently have an exercise routine? no 
 
Goals:  
1. Work to increase to 3-4 small meals per day, with planned snacks as needed. Recommend following plate method for meal planning - focusing on lean protein, non-starchy vegetables, and measured amounts of starch. - Goal of  g protein and  g carbohydrate per day. - Recommend continuing protein supplement as meal replacement at least 1x/day 2. Increase non caloric fluid to 64 oz per day. Eliminate caffeine, added sugar, carbonation, and straws.  
            -Continue to work to decrease sugar sweetened beverages - goal of calorie free beverages only 
            -Must eliminate caffeine prior to surgery and avoid for ~6-8 weeks 3. Start activity regimen, work to increase ADL 
            -Try to start walking for at least 30-60  minutes 5 days per week 4. Start Complete MVI Candidate for surgery (per RD): PENDING Sylvia Richards RD 
11/09/20

## 2020-11-17 NOTE — PROGRESS NOTES
Lf vm for pt to mke np sleep apt ref by PARI Ceron;  when/where if any evals/studies/cpap/dme?   Ask screening questions

## 2020-12-07 NOTE — PROGRESS NOTES
Bariatric Surgery Consultation    Subjective:     Kentrell Tejeda is a 41 y.o. obese male with a Body mass index is 51.19 kg/m². Denis Moses desires surgery at this time because of health issues and quality of life issues. Kentrell Tejeda has been seen by a bariatric nutritionist and has been placed on an appropriate low carbohydrate diet. The patient desires laparoscopic sleeve gastrectomy for surgical weight loss, however he is not currently a surgical candidate due to pending work up. Kentrell Tejeda is here today to check progress with weight loss / evaluate nutritional status and review all subspecialty clearances in hopes of proceeding to the operating room. Office visit notes from September 2020 to present have been reviewed. Kentrell Tejeda has increased fluid intake, is focusing on protein, is eating regularly, is taking a multivitamin & has increased his activity. No recent visits with PCP. No new medications. UGI done 11/4/2020 with normal anatomy.  Has hx of mildly delayed gastric emptying in past.    Patient Active Problem List    Diagnosis Date Noted    Gastroparesis     Morbid obesity with BMI of 45.0-49.9, adult ([facility] [address] 09/23/2020    Hypertension     Hyperlipidemia     Chronic kidney disease     CAD (coronary artery disease)     Hypothyroid     Hiatal hernia     Obstructive sleep apnea     GERD (gastroesophageal reflux disease)     Acute blood loss anemia 12/26/2018    GI bleed 12/26/2018    FSGS (focal segmental glomerulosclerosis) 12/26/2018    HAWA (obstructive sleep apnea) 11/15/2018    Morbidly obese ([facility] [address] 11/15/2018    HTN (hypertension) 11/15/2018    Hypothyroidism 11/15/2018    Stage 3 chronic kidney disease 11/15/2018    Psoriatic arthritis ([facility] [address]       Past Surgical History:   Procedure Laterality Date    CARDIAC SURG PROCEDURE UNLIST      cardiac stent placement 18 months ago- Dr Christina Patel 12/31/2018    COLONOSCOPY; BIOPSY; APC OF BLEEDING ULCER; POLYPECTOMY performed by Terrence Vincent MD at [facility] ENDOSCOPY    HX [address]      HX TONSILLECTOMY        Social History     Tobacco Use    Smoking status: Former Smoker     Packs/day: 2.00     Years: 18.00     Pack years: 36.00     Types: Cigarettes     Last attempt to quit: 2008     Years since quittin.9    Smokeless tobacco: Never Used   Substance Use Topics    Alcohol use: Yes     Comment: 2 times a year      Family History   Problem Relation Age of Onset    Heart Attack Mother 36    Heart Attack Father 48    Heart Surgery Father 48      Current Outpatient Medications   Medication Sig Dispense Refill    ixekizumab (TALTZ AUTOINJECTOR, 2 PACK, SC) by SubCUTAneous route.  buPROPion XL (WELLBUTRIN XL) 150 mg tablet Take 150 mg by mouth daily.  clopidogreL (Plavix) 75 mg tab Take  by mouth.  aspirin-calcium carbonate 81 mg-300 mg calcium(777 mg) tab Take 81 mg by mouth daily.  LORazepam (ATIVAN) 1 mg tablet Take 1 mg by mouth every six (6) hours as needed.  rosuvastatin (CRESTOR) 40 mg tablet Take 40 mg by mouth daily.  metoclopramide HCl (REGLAN) 10 mg tablet Take 10 mg by mouth Before breakfast, lunch, and dinner. Indications: gastroesophageal reflux disease      pantoprazole (PROTONIX) 40 mg tablet Take 40 mg by mouth daily. Indications: gastroesophageal reflux disease      sertraline (ZOLOFT) 100 mg tablet Take 100 mg by mouth daily. Indications: major depressive disorder      pantoprazole (PROTONIX) 40 mg tablet Take 1 Tab by mouth Before breakfast and dinner. 60 Tab 0    albuterol (PROAIR HFA) 90 mcg/actuation inhaler Take 2 Puffs by inhalation every six (6) hours as needed for Wheezing. Indications: Bronchospasm Prevention      metoprolol succinate (TOPROL XL) 100 mg tablet Take 50 mg by mouth two (2) times a day. Indications: high blood pressure      levothyroxine (SYNTHROID) 150 mcg tablet Take 125 mcg by mouth nightly. Indications: hypothyroidism      co-enzyme Q-10 (CO Q-10) 100 mg capsule Take 200 mg by mouth daily (with dinner). Indications: mineral supplement      latanoprost (XALATAN) 0.005 % ophthalmic solution Administer 1 Drop to both eyes nightly. Indications: increased pressure in the eye  5    predniSONE (DELTASONE) 20 mg tablet Take 10 mg by mouth every other day. taking 1/2 a tab or 5 mg tabs. Indications: inflammation of the nose due to an allergy      loperamide (IMMODIUM) 2 mg tablet Take 4 mg by mouth every twelve (12) hours every twelve (12) hours for Diarrhea. 3am and 3pm with cellcept dose  Indications: diarrhea      magnesium oxide (MAG-OX) 400 mg tablet Take 400 mg by mouth daily (with lunch).  Indications: low amount of magnesium in the blood       Allergies   Allergen Reactions    Animal Dander Rash          Review of Systems:        General - No history or complaints of unexpected fever, chills, or weight loss  Head/Neck - No history or complaints of headache, diplopia, dysphagia, hearing loss  Cardiac - No history or complaints of chest pain, palpitations, murmur, or shortness of breath  Pulmonary - No history or complaints of shortness of breath, productive cough, hemoptysis  Gastrointestinal - has reflux controlled by PPI,  abdominal pain, obstipation/constipation, blood per rectum  Genitourinary - No history or complaints of hematuria/dysuria, stress urinary incontinence symptoms, or renal lithiasis  Musculoskeletal - has joint pain in back, no muscular weakness  Hematologic - No history or complaints of bleeding disorders, blood transfusions, sickle cell anemia  Neurologic - No history or complaints of  migraine headaches, seizure activity, syncopal episodes, TIA or stroke  Integumentary - No history or complaints of rashes, abnormal nevi, skin cancer      Objective:     Visit Vitals  BP (!) 156/86 (BP 1 Location: Left arm, BP Patient Position: Sitting)   Pulse 88   Resp 16   Ht 5' 9.5\" (1.765 m)   Wt (!) 159.5 kg (351 lb 11.2 oz)   SpO2 96%   BMI 51.19 kg/m²       Physical Exam:     Physical Examination: General appearance - oriented to person, place, and time and ill-appearing  Mental status - alert, oriented to person, place, and time, normal mood, behavior, speech, dress, motor activity, and thought processes  Eyes - pupils equal and reactive, extraocular eye movements intact, sclera anicteric, left eye normal, right eye normal  Ears - right ear normal, left ear normal  Nose - normal and patent, no erythema, discharge or polyps  Mouth - mucous membranes moist, pharynx normal without lesions  Neck - supple, no significant adenopathy  Lymphatics - no palpable lymphadenopathy, no hepatosplenomegaly  Chest - clear to auscultation, no wheezes, rales or rhonchi, symmetric air entry  Heart - normal rate, regular rhythm, normal S1, S2, no murmurs, rubs, clicks or gallops  Abdomen - soft, nontender, nondistended, no masses or organomegaly  Severe central obesity  Back exam - full range of motion, no tenderness, palpable spasm or pain on motion  Neurological - alert, oriented, normal speech, no focal findings or movement disorder noted  Musculoskeletal - no joint tenderness, deformity or swelling  Extremities - peripheral pulses normal, no pedal edema, no clubbing or cyanosis      Labs:     Recent Results (from the past 2016 hour(s))   CBC WITH AUTOMATED DIFF    Collection Time: 10/22/20  7:50 AM   Result Value Ref Range    WBC 15.9 (H) 4.6 - 13.2 K/uL    RBC 4.95 4.70 - 5.50 M/uL    HGB 14.0 13.0 - 16.0 g/dL    HCT 43.6 36.0 - 48.0 %    MCV 88.1 74.0 - 97.0 FL    MCH 28.3 24.0 - 34.0 PG    MCHC 32.1 31.0 - 37.0 g/dL    RDW 14.5 11.6 - 14.5 %    PLATELET 672 603 - 648 K/uL    MPV 9.0 (L) 9.2 - 11.8 FL    NEUTROPHILS 69 40 - 73 %    LYMPHOCYTES 24 21 - 52 %    MONOCYTES 6 3 - 10 %    EOSINOPHILS 1 0 - 5 %    BASOPHILS 0 0 - 2 %    ABS. NEUTROPHILS 11.1 (H) 1.8 - 8.0 K/UL    ABS.  LYMPHOCYTES 3.8 (H) 0.9 - 3.6 K/UL    ABS. MONOCYTES 0.9 0.05 - 1.2 K/UL    ABS. EOSINOPHILS 0.1 0.0 - 0.4 K/UL    ABS. BASOPHILS 0.0 0.0 - 0.1 K/UL    DF AUTOMATED     MAGNESIUM    Collection Time: 10/22/20  7:50 AM   Result Value Ref Range    Magnesium 2.2 1.6 - 2.6 mg/dL   METABOLIC PANEL, COMPREHENSIVE    Collection Time: 10/22/20  7:50 AM   Result Value Ref Range    Sodium 143 136 - 145 mmol/L    Potassium 4.7 3.5 - 5.5 mmol/L    Chloride 110 100 - 111 mmol/L    CO2 27 21 - 32 mmol/L    Anion gap 6 3.0 - 18 mmol/L    Glucose 92 74 - 99 mg/dL    BUN 37 (H) 7.0 - 18 MG/DL    Creatinine 3.18 (H) 0.6 - 1.3 MG/DL    BUN/Creatinine ratio 12 12 - 20      GFR est AA 26 (L) >60 ml/min/1.73m2    GFR est non-AA 22 (L) >60 ml/min/1.73m2    Calcium 8.7 8.5 - 10.1 MG/DL    Bilirubin, total 0.2 0.2 - 1.0 MG/DL    ALT (SGPT) 20 16 - 61 U/L    AST (SGOT) 11 10 - 38 U/L    Alk.  phosphatase 101 45 - 117 U/L    Protein, total 6.3 (L) 6.4 - 8.2 g/dL    Albumin 2.7 (L) 3.4 - 5.0 g/dL    Globulin 3.6 2.0 - 4.0 g/dL    A-G Ratio 0.8 0.8 - 1.7     PHOSPHORUS    Collection Time: 10/22/20  7:50 AM   Result Value Ref Range    Phosphorus 4.5 2.5 - 4.9 MG/DL   URINALYSIS W/ RFLX MICROSCOPIC    Collection Time: 10/22/20  7:50 AM   Result Value Ref Range    Color YELLOW      Appearance CLEAR      Specific gravity 1.019 1.005 - 1.030      pH (UA) 6.0 5.0 - 8.0      Protein >1,000 (A) NEG mg/dL    Glucose 100 (A) NEG mg/dL    Ketone Negative NEG mg/dL    Bilirubin Negative NEG      Blood MODERATE (A) NEG      Urobilinogen 0.2 0.2 - 1.0 EU/dL    Nitrites Negative NEG      Leukocyte Esterase Negative NEG     [address] FAX RESULT    Collection Time: 10/22/20  7:50 AM   Result Value Ref Range    FAX TO NUMBER [id number]      FAX TO INFO FAXED BY [facility]    MICROALBUMIN, UR, RAND W/ MICROALB/CREAT RATIO    Collection Time: 10/22/20  7:50 AM   Result Value Ref Range    Microalbumin,urine random 570.00 (H) 0 - 3.0 MG/DL    Creatinine, urine 116.00 30 - 125 mg/dL    Microalbumin/Creat ratio (mg/g creat) 4,914 (H) 0 - 30 mg/g   URINE MICROSCOPIC ONLY    Collection Time: 10/22/20  7:50 AM   Result Value Ref Range    WBC 4 to 6 0 - 5 /hpf    RBC 45 to 50 0 - 5 /hpf    Epithelial cells 1+ 0 - 5 /lpf    Bacteria 1+ (A) NEG /hpf    Mucus 2+ (A) NEG /lpf       TTE 3/16/2019    TECHNICALLY DIFFICULT STUDY. UNABLE TO ASSESS REGIONAL WALL MOTION WELL. GROSSLY NORMAL LEFT VENTRICULAR SYSTOLIC FUNCTION. NORMAL DIASTOLIC FUNCTION. EJECTION FRACTION IS 60% BY VISUAL ESTIMATION. NO HEMODYNAMICALLY SIGNIFICANT VALVULAR PATHOLOGY. NORMAL INFERIOR VENA CAVA WITH NORMAL COLLAPSE ON INSPIRATION. UNABLE TO ASSESS PULMONARY ARTERY PRESSURE. NO PREVIOUS REPORT FOR COMPARISON.         ABD CT with contrast 3/15/2019      1. Evidence of hepatic steatosis. No focal hepatic lesions are identified. 2. The bowel does not appear obstructed. A normal appendix is identified. 3. Mild diffuse anasarca. Trace mesenteric edema is identified. 4. Additional chronic changes are detailed above.             Gastric Empyting Study from 3/18/2019      The images demonstrate a normal configuration of the visualized stomach and bowel. The time activity curve demonstrates approximately 11 percent gastric emptying at 26 minutes; 22 percent gastric emptying at 56 minutes; 30 percent gastric emptying at 118 minutes; and 84 percent gastric emptying at 225 minutes. (Normal values 10-63% at one hour, 40-70% at 2 hours, and % at 4 hours.)    IMPRESSION    Mildly delayed gastric emptying, as detailed above. Assessment:     Morbid obesity with associated comorbidity of hypertension, sleep apnea, severe central obesity & outstanding insurance requirements. Plan: To continue current medications & routine follow-up with PCP. Continuation of Pre-Operative evaluation / clearance:  Rafiq Hidalgo has returned to the office today to discuss his status as a surgical candidate.   Progress has been noted and reviewed - including review of notes from dietician. Génesis Villegas is being compliant with follow-up & recommendations. Génesis Villegas has 30-40 more pounds to lose before proceeding to the OR.  (21 pounds gained since last visit)  Génesis Villegas has 6 more nutritional visits to complete before proceeding to the OR. Génesis Villegas has an outstanding psychological and cardiac clearance to review before proceeding to the OR. Génesis Villegas will return in 1 month to continue the pre-operative process and to work towards goals as outlined. At this juncture the patient has an incredible number of clearances to pass prior to surgical intervention. Firstly the patient must be cleared by his cardiologist for surgical intervention. Secondly the patient will need to repeat his gastric emptying study since he has such significant gastroparesis to see if Reglan is improving his situation. Next the patient needs a sleep study to be performed to rule out sleep apnea. My assumption is that he will be positive and needs to be placed on a CPAP machine. Lastly the patient must lose weight in hopes of improving his renal function as his baseline creatinine is 3.3. Less than a year ago his creatinine was 2.54 when his weight was approximately 30 pounds lighter. I have set a weight loss goal for him of approximately 30 to 40 pounds which we most likely will have to adhere to. Be seeing him every 2 months to check on all his clearances and also check on his weight loss. I have given him no guarantee that he is a surgical candidate and we will simply have to see if all these things can be achieved. Génesis Villegas understand the rationales for all the above and plans to follow the diet & activity recommendations of the dietician. It has been discussed that given his   condition that the best surgical option for this patient would be the laparoscopic sleeve gastrectomy.   Génesis Villegas agrees with the surgical choice and has been educated in it's; risks, benefits, and alternatives. We will continue with the pre-operative evaluation as needed to check progress. Secondary Diagnoses:     Dietary Intervention  - The patient is currently followed by a bariatric nutritionist for an attempt at preoperative weight loss as has been dictated by their insurance carrier. They will be assessed at various times during their follow up to evaluate their progress depending on the length of time that is required once again by their carrier. I have explained the importance of preoperative weight loss and the benefits regarding lower surgical risk and also assisting the patient in reaching their weight loss goal.  Finally they understand there is a physiologic benefit from the standpoint of hepatic volume reduction preoperatively. I have reiterated the importance of a low carbohydrate and high protein regimen to achieve their stated goal.The patients weight loss goal pre operatively is 10-20 pounds. Coronary Artery Disease - The patient has undergone or will undergo a preoperative evaluation by a cardiologist such that they are deemed a reasonable candidate for surgery. The patient understands that with a history of cardiac disease that there is always an increased risk compared to the average patient. Appropriate recommendations have been followed as recommended by the cardiologist.  The patients ASA will be resumed approximately 1 month postoperatively in a coated form. Patient will obtain cardiac clearance from Dr Jeremi Gray     Obstructive Sleep Apnea -The patient understands the association of sleep apnea and obesity and the additional risk that it caries related to post surgical complications. If they have not been tested for sleep apnea and I feel they are at increased risk for this diagnosis, then they will be scheduled for a consultation with a Pulmonologist for such.  In the event that they jonathan this diagnosis we will have the patient bring their CPAP machine to the hospital for use both postoperatively in the PACU and on the floor at its appropriate setting.  We will have them continue using it while at home after surgery and follow up with their pulmonologist 6 months after to be retested to see if it can be discontinued at that time period. Will place referral to pulmonology for updated sleep study and likely need for CPAP machine prior to surgery     GERD -The patient understands that weight loss surgery is not a guaranteed cure for reflux disease but does understand the benefits that weight loss can have on reflux disease. They also understand that at the time of surgery the gastroesophageal junction will be evaluated for the presence of a diaphragmatic hernia. Hernias will be corrected always with the gastric band and sleeve gastrectomy procedures, but only on a case by case basis with the gastric bypass. The patient also understands that neither weight loss surgery nor repair of a diaphragmatic hernia repair guarantees the complete cessation of the disease. Mr. Mor Candelaria has a reminder for a \"due or due soon\" health maintenance. I have asked that he contact his primary care provider for follow-up on this health maintenance.       Signed By: Krish Goss MD     December 7, 2020 never used

## 2020-12-14 NOTE — PROGRESS NOTES
Medical Weight Loss Multi-Disciplinary Program 
 
Name: Yulissa Larson   : 1980 Session# 2 Date: 2020 Visit Vitals Ht 5' 9.5\" (1.765 m) Wt (!) 159.2 kg (351 lb) BMI 51.09 kg/m² Dietary Instructions Reviewed intake Understanding low carbohydrates, low sugar, higher protein meals Instruction given for personal dietary changes Discussed perceived compliance Comments: Diet hx reviewed and personal dietary changes discussed. Reviewed recommendation to follow [id number] calorie diet, working to reduce total carbohydrate intake to  g or less per day and increasing protein intake to  g per day, compared current intake to recommendations. Today the majority of our visit was spent reviewing patient's food recall and identifying areas for improvement. Educated  on the importance of eating 3 meals/day at regularly scheduled times including breakfast within 1st 1-2 hours of waking. Suggested patient use a protein supplement as a meal replacement instead of skipping OR as a between meal high protein snack. Patient has been working on small changes this month, but still not consistently cooking and preparing meals -still not closely monitoring carbohydrate. He reports that he feels like support from spouse will assist with behavior changes. Discussed resources that were mailed to patient last month to assist with low carb swaps, meal and snack ideas and alternative beverage options. Utilized motivational interviewing to assist with setting behavior change goals this month Nutritional Hx: What is the number of meals you eat per day? 3 Comment: Do you eat between meals / snack? yes Typical snack: chips How fast do you eat your meals? rapid How often do you eat fast food? 3 times a week How many sodas/sugared beverages do you drink per day? >30 oz How many caffeinated drinks do you have per day? Eliminated sweet tea How much milk and/or juice do you drink per day? Rarely has juice, occasionally milk with cereal  
 
How much water do you drink per day? 3 (8oz glasses) How often do you consume alcohol? None Current Vitamins: Magnesium, CoQ10, KCl Diet History: 
 
Typical intake is as follows: 
Breakfast:2 eggs with 3 turkey sausage  -eliminated sweet tea- replaced with [employer] Lunch: [employer] - Beef burrito supreme (50 g CHO) with Soft Taco Supreme - (20 g CHO) Snacks: Beef jerky OR Beef and cheese stick Dinner: Chicken fajitas in toritlla (Consumes 2 tortilla) OR 8 -10 oz baked salmon with peas PM : Snack - 2 slices of bread with michael and 1 banana Fluids:Hint water -16.9 oz 2-3 bottles per day, 20 oz soda - 2-3 times per week Reviewed intake Understanding low carbohydrates, low sugar, higher protein meals Understanding proper portions Dining outside home Instruction given for personal dietary changes Discussed perceived compliance Comments: Pt given brief pre/post-op diet ed and diet hx reviewed. Patient Education and Materials Provided: 
Supplement [facility], B Vitamin Information, MVI Recommendations, Calcium Citrate Information, Bariatric Supplement Companies, Protein Supplement Information, Fluid Requirements, No Caffeine or Carbonation, No Alcohol for One Year Post Op, 3 Balanced Meals a Day, Food Group Guide, Good Choices Dining Out, No Snacks, No Concentrated Sweets, Support System at Home, Exercising, Support Group Information and Addressed Current Habits / Changes to make Physical Activity/Exercise Discussed Perceived Compliance Reasonable Goals Set Motivation Comments: none Exercise: Do you currently have an exercise routine? no 
 
Goals:  
1. Work to increase to 3-4 small meals per day, with planned snacks as needed. Recommend following plate method for meal planning - focusing on lean protein, non-starchy vegetables, and measured amounts of starch. - Goal of  g protein and  g carbohydrate per day. - Recommend continuing protein supplement as meal replacement at least 1x/day 2. Increase non caloric fluid to 64 oz per day. Eliminate caffeine, added sugar, carbonation, and straws.  
            -Continue to work to decrease sugar sweetened beverages - goal of calorie free beverages only 
            -Must eliminate caffeine prior to surgery and avoid for ~6-8 weeks 3. Start activity regimen, work to increase ADL 
            -Try to start walking for at least 30-60  minutes 5 days per week 4. Start Complete MVI Candidate for surgery (per RD): PENDING Sharath Curiel, ALISON

## 2021-01-01 ENCOUNTER — HOSPITAL ENCOUNTER (OUTPATIENT)
Dept: LAB | Age: 41
Discharge: HOME | End: 2021-08-20
Payer: COMMERCIAL

## 2021-01-01 ENCOUNTER — HOSPITAL ENCOUNTER (OUTPATIENT)
Dept: LAB | Age: 41
Discharge: HOME | End: 2021-05-17
Payer: COMMERCIAL

## 2021-01-01 ENCOUNTER — APPOINTMENT (OUTPATIENT)
Dept: GENERAL RADIOLOGY | Age: 41
DRG: 871 | End: 2021-01-01
Attending: EMERGENCY MEDICINE
Payer: COMMERCIAL

## 2021-01-01 ENCOUNTER — HOSPITAL ENCOUNTER (OUTPATIENT)
Dept: LAB | Age: 41
Discharge: HOME | End: 2021-02-12
Payer: COMMERCIAL

## 2021-01-01 ENCOUNTER — HOSPITAL ENCOUNTER (INPATIENT)
Age: 41
LOS: 1 days | DRG: 871 | End: 2021-09-26
Attending: EMERGENCY MEDICINE | Admitting: FAMILY MEDICINE
Payer: COMMERCIAL

## 2021-01-01 ENCOUNTER — CLINICAL SUPPORT (OUTPATIENT)
Dept: SURGERY | Age: 41
End: 2021-01-01

## 2021-01-01 VITALS — WEIGHT: 315 LBS | HEIGHT: 70 IN | BODY MASS INDEX: 45.1 KG/M2

## 2021-01-01 VITALS
SYSTOLIC BLOOD PRESSURE: 83 MMHG | WEIGHT: 315 LBS | HEIGHT: 70 IN | RESPIRATION RATE: 16 BRPM | TEMPERATURE: 96.8 F | DIASTOLIC BLOOD PRESSURE: 44 MMHG | HEART RATE: 93 BPM | BODY MASS INDEX: 45.1 KG/M2 | OXYGEN SATURATION: 66 %

## 2021-01-01 DIAGNOSIS — E66.01 MORBID OBESITY (HCC): ICD-10-CM

## 2021-01-01 DIAGNOSIS — E66.01 MORBID OBESITY WITH BMI OF 45.0-49.9, ADULT (HCC): Primary | ICD-10-CM

## 2021-01-01 DIAGNOSIS — R57.9 SHOCK (HCC): ICD-10-CM

## 2021-01-01 DIAGNOSIS — J96.01 ACUTE HYPOXEMIC RESPIRATORY FAILURE DUE TO COVID-19 (HCC): Primary | ICD-10-CM

## 2021-01-01 DIAGNOSIS — U07.1 ACUTE HYPOXEMIC RESPIRATORY FAILURE DUE TO COVID-19 (HCC): Primary | ICD-10-CM

## 2021-01-01 DIAGNOSIS — J96.01 ACUTE RESPIRATORY FAILURE WITH HYPOXIA (HCC): ICD-10-CM

## 2021-01-01 DIAGNOSIS — E87.5 ACUTE HYPERKALEMIA: ICD-10-CM

## 2021-01-01 DIAGNOSIS — N18.5 CKD (CHRONIC KIDNEY DISEASE) STAGE 5, GFR LESS THAN 15 ML/MIN (HCC): ICD-10-CM

## 2021-01-01 DIAGNOSIS — R77.8 ELEVATED TROPONIN: ICD-10-CM

## 2021-01-01 DIAGNOSIS — R65.10 SIRS (SYSTEMIC INFLAMMATORY RESPONSE SYNDROME) (HCC): ICD-10-CM

## 2021-01-01 LAB
25(OH)D3 SERPL-MCNC: 12.1 NG/ML (ref 30–100)
ALBUMIN SERPL-MCNC: 2 G/DL (ref 3.4–5)
ALBUMIN SERPL-MCNC: 2.5 G/DL (ref 3.4–5)
ALBUMIN SERPL-MCNC: 2.6 G/DL (ref 3.4–5)
ALBUMIN SERPL-MCNC: 2.7 G/DL (ref 3.4–5)
ALBUMIN/GLOB SERPL: 0.5 {RATIO} (ref 0.8–1.7)
ALBUMIN/GLOB SERPL: 0.8 {RATIO} (ref 0.8–1.7)
ALP SERPL-CCNC: 102 U/L (ref 45–117)
ALP SERPL-CCNC: 121 U/L (ref 45–117)
ALP SERPL-CCNC: 133 U/L (ref 45–117)
ALP SERPL-CCNC: 99 U/L (ref 45–117)
ALT SERPL-CCNC: 111 U/L (ref 16–61)
ALT SERPL-CCNC: 17 U/L (ref 16–61)
ALT SERPL-CCNC: 18 U/L (ref 16–61)
ALT SERPL-CCNC: 24 U/L (ref 16–61)
AMORPH CRY URNS QL MICRO: ABNORMAL
ANION GAP SERPL CALC-SCNC: 16 MMOL/L (ref 3–18)
ANION GAP SERPL CALC-SCNC: 5 MMOL/L (ref 3–18)
ANION GAP SERPL CALC-SCNC: 7 MMOL/L (ref 3–18)
ANION GAP SERPL CALC-SCNC: 8 MMOL/L (ref 3–18)
APPEARANCE UR: CLEAR
APTT PPP: 39.7 SEC (ref 23–36.4)
AST SERPL-CCNC: 11 U/L (ref 10–38)
AST SERPL-CCNC: 12 U/L (ref 10–38)
AST SERPL-CCNC: 129 U/L (ref 10–38)
AST SERPL-CCNC: 6 U/L (ref 10–38)
ATRIAL RATE: 113 BPM
BACTERIA URNS QL MICRO: ABNORMAL /HPF
BASOPHILS # BLD: 0 K/UL (ref 0–0.1)
BASOPHILS # BLD: 0 K/UL (ref 0–0.1)
BASOPHILS # BLD: 0.1 K/UL (ref 0–0.1)
BASOPHILS # BLD: 0.1 K/UL (ref 0–0.1)
BASOPHILS NFR BLD: 0 % (ref 0–2)
BILIRUB SERPL-MCNC: 0.2 MG/DL (ref 0.2–1)
BILIRUB SERPL-MCNC: 0.5 MG/DL (ref 0.2–1)
BILIRUB UR QL: NEGATIVE
BNP SERPL-MCNC: 1102 PG/ML (ref 0–450)
BUN SERPL-MCNC: 32 MG/DL (ref 7–18)
BUN SERPL-MCNC: 35 MG/DL (ref 7–18)
BUN SERPL-MCNC: 36 MG/DL (ref 7–18)
BUN SERPL-MCNC: 46 MG/DL (ref 7–18)
BUN/CREAT SERPL: 10 (ref 12–20)
BUN/CREAT SERPL: 11 (ref 12–20)
BUN/CREAT SERPL: 12 (ref 12–20)
BUN/CREAT SERPL: 7 (ref 12–20)
CALCIUM SERPL-MCNC: 7.8 MG/DL (ref 8.5–10.1)
CALCIUM SERPL-MCNC: 8 MG/DL (ref 8.5–10.1)
CALCIUM SERPL-MCNC: 8.3 MG/DL (ref 8.5–10.1)
CALCIUM SERPL-MCNC: 8.4 MG/DL (ref 8.5–10.1)
CALCULATED P AXIS, ECG09: 55 DEGREES
CALCULATED R AXIS, ECG10: 99 DEGREES
CALCULATED T AXIS, ECG11: 90 DEGREES
CHLORIDE SERPL-SCNC: 111 MMOL/L (ref 100–111)
CHLORIDE SERPL-SCNC: 112 MMOL/L (ref 100–111)
CHLORIDE SERPL-SCNC: 112 MMOL/L (ref 100–111)
CHLORIDE SERPL-SCNC: 115 MMOL/L (ref 100–111)
CHOLEST SERPL-MCNC: 231 MG/DL
CK MB CFR SERPL CALC: 6.7 % (ref 0–4)
CK MB SERPL-MCNC: 37.2 NG/ML (ref 5–25)
CK SERPL-CCNC: 554 U/L (ref 39–308)
CO2 SERPL-SCNC: 12 MMOL/L (ref 21–32)
CO2 SERPL-SCNC: 22 MMOL/L (ref 21–32)
CO2 SERPL-SCNC: 24 MMOL/L (ref 21–32)
CO2 SERPL-SCNC: 25 MMOL/L (ref 21–32)
COLOR UR: YELLOW
COVID-19 RAPID TEST, COVR: DETECTED
CREAT SERPL-MCNC: 2.92 MG/DL (ref 0.6–1.3)
CREAT SERPL-MCNC: 3.05 MG/DL (ref 0.6–1.3)
CREAT SERPL-MCNC: 3.09 MG/DL (ref 0.6–1.3)
CREAT SERPL-MCNC: 6.86 MG/DL (ref 0.6–1.3)
CREAT UR-MCNC: 144 MG/DL (ref 30–125)
CREAT UR-MCNC: 145 MG/DL (ref 30–125)
CREAT UR-MCNC: 151 MG/DL (ref 30–125)
CREAT UR-MCNC: 151 MG/DL (ref 30–125)
CREAT UR-MCNC: 66 MG/DL (ref 30–125)
CREAT UR-MCNC: 68 MG/DL (ref 30–125)
CRP SERPL-MCNC: 0.4 MG/DL (ref 0–0.3)
CRP SERPL-MCNC: 1.8 MG/DL (ref 0–0.3)
CRP SERPL-MCNC: 32.5 MG/DL (ref 0–0.3)
D DIMER PPP FEU-MCNC: 1.97 UG/ML(FEU)
DIAGNOSIS, 93000: NORMAL
DIFFERENTIAL METHOD BLD: ABNORMAL
EOSINOPHIL # BLD: 0 K/UL (ref 0–0.4)
EOSINOPHIL # BLD: 0.1 K/UL (ref 0–0.4)
EOSINOPHIL # BLD: 0.1 K/UL (ref 0–0.4)
EOSINOPHIL # BLD: 0.2 K/UL (ref 0–0.4)
EOSINOPHIL NFR BLD: 1 % (ref 0–5)
EPITH CASTS URNS QL MICRO: ABNORMAL /LPF (ref 0–5)
ERYTHROCYTE [DISTWIDTH] IN BLOOD BY AUTOMATED COUNT: 14 % (ref 11.6–14.5)
ERYTHROCYTE [DISTWIDTH] IN BLOOD BY AUTOMATED COUNT: 14.1 % (ref 11.6–14.5)
ERYTHROCYTE [DISTWIDTH] IN BLOOD BY AUTOMATED COUNT: 14.3 % (ref 11.6–14.5)
ERYTHROCYTE [DISTWIDTH] IN BLOOD BY AUTOMATED COUNT: 15.5 % (ref 11.6–14.5)
ERYTHROCYTE [SEDIMENTATION RATE] IN BLOOD: 15 MM/HR (ref 0–15)
ERYTHROCYTE [SEDIMENTATION RATE] IN BLOOD: 60 MM/HR (ref 0–15)
FAX TO INFO,FAXT: NORMAL
FAX TO NUMBER,FAXN: NORMAL
FERRITIN SERPL-MCNC: 1281 NG/ML (ref 8–388)
FIBRINOGEN PPP-MCNC: 740 MG/DL (ref 210–451)
GLOBULIN SER CALC-MCNC: 3.3 G/DL (ref 2–4)
GLOBULIN SER CALC-MCNC: 3.3 G/DL (ref 2–4)
GLOBULIN SER CALC-MCNC: 3.6 G/DL (ref 2–4)
GLOBULIN SER CALC-MCNC: 4.3 G/DL (ref 2–4)
GLUCOSE SERPL-MCNC: 108 MG/DL (ref 74–99)
GLUCOSE SERPL-MCNC: 160 MG/DL (ref 74–99)
GLUCOSE SERPL-MCNC: 84 MG/DL (ref 74–99)
GLUCOSE SERPL-MCNC: 89 MG/DL (ref 74–99)
GLUCOSE UR STRIP.AUTO-MCNC: NEGATIVE MG/DL
GRAN CASTS URNS QL MICRO: ABNORMAL /LPF
HCT VFR BLD AUTO: 40.2 % (ref 36–48)
HCT VFR BLD AUTO: 43.7 % (ref 36–48)
HCT VFR BLD AUTO: 47 % (ref 36–48)
HCT VFR BLD AUTO: 47.2 % (ref 36–48)
HDLC SERPL-MCNC: 38 MG/DL (ref 40–60)
HDLC SERPL: 6.1 {RATIO} (ref 0–5)
HGB BLD-MCNC: 12 G/DL (ref 13–16)
HGB BLD-MCNC: 13.5 G/DL (ref 13–16)
HGB BLD-MCNC: 13.8 G/DL (ref 13–16)
HGB BLD-MCNC: 14 G/DL (ref 13–16)
HGB UR QL STRIP: ABNORMAL
INR PPP: 1.2 (ref 0.8–1.2)
KETONES UR QL STRIP.AUTO: NEGATIVE MG/DL
LDH SERPL L TO P-CCNC: 1218 U/L (ref 81–234)
LDLC SERPL CALC-MCNC: 147.4 MG/DL (ref 0–100)
LEUKOCYTE ESTERASE UR QL STRIP.AUTO: NEGATIVE
LIPID PROFILE,FLP: ABNORMAL
LYMPHOCYTES # BLD: 1 K/UL (ref 0.9–3.6)
LYMPHOCYTES # BLD: 1.7 K/UL (ref 0.9–3.6)
LYMPHOCYTES # BLD: 2.9 K/UL (ref 0.9–3.6)
LYMPHOCYTES # BLD: 3.3 K/UL (ref 0.9–3.6)
LYMPHOCYTES NFR BLD: 10 % (ref 21–52)
LYMPHOCYTES NFR BLD: 18 % (ref 21–52)
LYMPHOCYTES NFR BLD: 19 % (ref 21–52)
LYMPHOCYTES NFR BLD: 20 % (ref 21–52)
MAGNESIUM SERPL-MCNC: 2 MG/DL (ref 1.6–2.6)
MAGNESIUM SERPL-MCNC: 2 MG/DL (ref 1.6–2.6)
MAGNESIUM SERPL-MCNC: 2.1 MG/DL (ref 1.6–2.6)
MAGNESIUM SERPL-MCNC: 2.5 MG/DL (ref 1.6–2.6)
MCH RBC QN AUTO: 25.7 PG (ref 24–34)
MCH RBC QN AUTO: 26.6 PG (ref 24–34)
MCH RBC QN AUTO: 27.3 PG (ref 24–34)
MCH RBC QN AUTO: 27.7 PG (ref 24–34)
MCHC RBC AUTO-ENTMCNC: 29.2 G/DL (ref 31–37)
MCHC RBC AUTO-ENTMCNC: 29.8 G/DL (ref 31–37)
MCHC RBC AUTO-ENTMCNC: 29.9 G/DL (ref 31–37)
MCHC RBC AUTO-ENTMCNC: 30.9 G/DL (ref 31–37)
MCV RBC AUTO: 87.7 FL (ref 78–100)
MCV RBC AUTO: 89.2 FL (ref 74–97)
MCV RBC AUTO: 89.7 FL (ref 74–97)
MCV RBC AUTO: 91.4 FL (ref 74–97)
MICROALBUMIN UR-MCNC: 408 MG/DL (ref 0–3)
MICROALBUMIN UR-MCNC: 666 MG/DL (ref 0–3)
MICROALBUMIN UR-MCNC: 801 MG/DL (ref 0–3)
MICROALBUMIN/CREAT UR-RTO: 4593 MG/G (ref 0–30)
MICROALBUMIN/CREAT UR-RTO: 5305 MG/G (ref 0–30)
MICROALBUMIN/CREAT UR-RTO: 6182 MG/G (ref 0–30)
MONOCYTES # BLD: 0.1 K/UL (ref 0.05–1.2)
MONOCYTES # BLD: 0.9 K/UL (ref 0.05–1.2)
MONOCYTES # BLD: 1 K/UL (ref 0.05–1.2)
MONOCYTES # BLD: 1.3 K/UL (ref 0.05–1.2)
MONOCYTES NFR BLD: 3 % (ref 3–10)
MONOCYTES NFR BLD: 5 % (ref 3–10)
MONOCYTES NFR BLD: 6 % (ref 3–10)
MONOCYTES NFR BLD: 7 % (ref 3–10)
MUCOUS THREADS URNS QL MICRO: ABNORMAL /LPF
NEUTS BAND NFR BLD MANUAL: 16 % (ref 0–5)
NEUTS SEG # BLD: 11.1 K/UL (ref 1.8–8)
NEUTS SEG # BLD: 13.9 K/UL (ref 1.8–8)
NEUTS SEG # BLD: 14.5 K/UL (ref 1.8–8)
NEUTS SEG # BLD: 3.7 K/UL (ref 1.8–8)
NEUTS SEG NFR BLD: 60 % (ref 40–73)
NEUTS SEG NFR BLD: 74 % (ref 40–73)
NEUTS SEG NFR BLD: 75 % (ref 40–73)
NEUTS SEG NFR BLD: 82 % (ref 40–73)
NITRITE UR QL STRIP.AUTO: NEGATIVE
P-R INTERVAL, ECG05: 116 MS
PH UR STRIP: 5.5 [PH] (ref 5–8)
PHOSPHATE SERPL-MCNC: 3.8 MG/DL (ref 2.5–4.9)
PHOSPHATE SERPL-MCNC: 4.2 MG/DL (ref 2.5–4.9)
PHOSPHATE SERPL-MCNC: 4.4 MG/DL (ref 2.5–4.9)
PLATELET # BLD AUTO: 197 K/UL (ref 135–420)
PLATELET # BLD AUTO: 370 K/UL (ref 135–420)
PLATELET # BLD AUTO: 397 K/UL (ref 135–420)
PLATELET # BLD AUTO: 415 K/UL (ref 135–420)
PLATELET COMMENTS,PCOM: ABNORMAL
PMV BLD AUTO: 10.6 FL (ref 9.2–11.8)
PMV BLD AUTO: 8.7 FL (ref 9.2–11.8)
PMV BLD AUTO: 9.4 FL (ref 9.2–11.8)
PMV BLD AUTO: 9.5 FL (ref 9.2–11.8)
POTASSIUM SERPL-SCNC: 4.4 MMOL/L (ref 3.5–5.5)
POTASSIUM SERPL-SCNC: 4.6 MMOL/L (ref 3.5–5.5)
POTASSIUM SERPL-SCNC: 4.8 MMOL/L (ref 3.5–5.5)
POTASSIUM SERPL-SCNC: 6.1 MMOL/L (ref 3.5–5.5)
PROCALCITONIN SERPL-MCNC: 12.65 NG/ML
PROT SERPL-MCNC: 5.8 G/DL (ref 6.4–8.2)
PROT SERPL-MCNC: 5.9 G/DL (ref 6.4–8.2)
PROT SERPL-MCNC: 6.3 G/DL (ref 6.4–8.2)
PROT SERPL-MCNC: 6.3 G/DL (ref 6.4–8.2)
PROT UR STRIP-MCNC: >1000 MG/DL
PROT UR-MCNC: 495 MG/DL
PROT UR-MCNC: 826 MG/DL
PROT UR-MCNC: 929 MG/DL
PROT/CREAT UR-RTO: 5.7
PROT/CREAT UR-RTO: 6.2
PROT/CREAT UR-RTO: 7.3
PROTHROMBIN TIME: 14.2 SEC (ref 11.5–15.2)
Q-T INTERVAL, ECG07: 344 MS
QRS DURATION, ECG06: 96 MS
QTC CALCULATION (BEZET), ECG08: 471 MS
RBC # BLD AUTO: 4.4 M/UL (ref 4.35–5.65)
RBC # BLD AUTO: 4.87 M/UL (ref 4.7–5.5)
RBC # BLD AUTO: 5.27 M/UL (ref 4.35–5.65)
RBC # BLD AUTO: 5.38 M/UL (ref 4.35–5.65)
RBC #/AREA URNS HPF: ABNORMAL /HPF (ref 0–5)
RBC MORPH BLD: ABNORMAL
SODIUM SERPL-SCNC: 140 MMOL/L (ref 136–145)
SODIUM SERPL-SCNC: 141 MMOL/L (ref 136–145)
SODIUM SERPL-SCNC: 143 MMOL/L (ref 136–145)
SODIUM SERPL-SCNC: 145 MMOL/L (ref 136–145)
SOURCE, COVRS: ABNORMAL
SP GR UR REFRACTOMETRY: 1.02 (ref 1–1.03)
TRIGL SERPL-MCNC: 228 MG/DL (ref ?–150)
TROPONIN I SERPL-MCNC: 1.52 NG/ML (ref 0–0.04)
TSH SERPL DL<=0.05 MIU/L-ACNC: 1.59 UIU/ML (ref 0.36–3.74)
UROBILINOGEN UR QL STRIP.AUTO: 0.2 EU/DL (ref 0.2–1)
VENTRICULAR RATE, ECG03: 113 BPM
VLDLC SERPL CALC-MCNC: 45.6 MG/DL
WBC # BLD AUTO: 15 K/UL (ref 4.6–13.2)
WBC # BLD AUTO: 17.8 K/UL (ref 4.6–13.2)
WBC # BLD AUTO: 18.5 K/UL (ref 4.6–13.2)
WBC # BLD AUTO: 4.8 K/UL (ref 4.6–13.2)
WBC URNS QL MICRO: ABNORMAL /HPF (ref 0–5)

## 2021-01-01 PROCEDURE — 85379 FIBRIN DEGRADATION QUANT: CPT

## 2021-01-01 PROCEDURE — 74011250636 HC RX REV CODE- 250/636: Performed by: EMERGENCY MEDICINE

## 2021-01-01 PROCEDURE — 81001 URINALYSIS AUTO W/SCOPE: CPT

## 2021-01-01 PROCEDURE — 85025 COMPLETE CBC W/AUTO DIFF WBC: CPT

## 2021-01-01 PROCEDURE — 83880 ASSAY OF NATRIURETIC PEPTIDE: CPT

## 2021-01-01 PROCEDURE — 74011636637 HC RX REV CODE- 636/637: Performed by: FAMILY MEDICINE

## 2021-01-01 PROCEDURE — 02HV33Z INSERTION OF INFUSION DEVICE INTO SUPERIOR VENA CAVA, PERCUTANEOUS APPROACH: ICD-10-PCS | Performed by: RADIOLOGY

## 2021-01-01 PROCEDURE — 74011250637 HC RX REV CODE- 250/637: Performed by: FAMILY MEDICINE

## 2021-01-01 PROCEDURE — 36415 COLL VENOUS BLD VENIPUNCTURE: CPT

## 2021-01-01 PROCEDURE — 96365 THER/PROPH/DIAG IV INF INIT: CPT

## 2021-01-01 PROCEDURE — 84145 PROCALCITONIN (PCT): CPT

## 2021-01-01 PROCEDURE — 84100 ASSAY OF PHOSPHORUS: CPT

## 2021-01-01 PROCEDURE — 84156 ASSAY OF PROTEIN URINE: CPT

## 2021-01-01 PROCEDURE — 80053 COMPREHEN METABOLIC PANEL: CPT

## 2021-01-01 PROCEDURE — 85384 FIBRINOGEN ACTIVITY: CPT

## 2021-01-01 PROCEDURE — 83735 ASSAY OF MAGNESIUM: CPT

## 2021-01-01 PROCEDURE — 82728 ASSAY OF FERRITIN: CPT

## 2021-01-01 PROCEDURE — 74011000258 HC RX REV CODE- 258: Performed by: FAMILY MEDICINE

## 2021-01-01 PROCEDURE — 74011250636 HC RX REV CODE- 250/636: Performed by: FAMILY MEDICINE

## 2021-01-01 PROCEDURE — 31500 INSERT EMERGENCY AIRWAY: CPT

## 2021-01-01 PROCEDURE — 5A1935Z RESPIRATORY VENTILATION, LESS THAN 24 CONSECUTIVE HOURS: ICD-10-PCS | Performed by: FAMILY MEDICINE

## 2021-01-01 PROCEDURE — 0BH17EZ INSERTION OF ENDOTRACHEAL AIRWAY INTO TRACHEA, VIA NATURAL OR ARTIFICIAL OPENING: ICD-10-PCS | Performed by: FAMILY MEDICINE

## 2021-01-01 PROCEDURE — 74011000250 HC RX REV CODE- 250: Performed by: EMERGENCY MEDICINE

## 2021-01-01 PROCEDURE — 74011000258 HC RX REV CODE- 258: Performed by: EMERGENCY MEDICINE

## 2021-01-01 PROCEDURE — 82043 UR ALBUMIN QUANTITATIVE: CPT

## 2021-01-01 PROCEDURE — 85610 PROTHROMBIN TIME: CPT

## 2021-01-01 PROCEDURE — 85652 RBC SED RATE AUTOMATED: CPT

## 2021-01-01 PROCEDURE — 86140 C-REACTIVE PROTEIN: CPT

## 2021-01-01 PROCEDURE — 96374 THER/PROPH/DIAG INJ IV PUSH: CPT

## 2021-01-01 PROCEDURE — 80061 LIPID PANEL: CPT

## 2021-01-01 PROCEDURE — 94002 VENT MGMT INPAT INIT DAY: CPT

## 2021-01-01 PROCEDURE — 51702 INSERT TEMP BLADDER CATH: CPT

## 2021-01-01 PROCEDURE — 65270000029 HC RM PRIVATE

## 2021-01-01 PROCEDURE — 75810000455 HC PLCMT CENT VENOUS CATH LVL 2 5182

## 2021-01-01 PROCEDURE — 96375 TX/PRO/DX INJ NEW DRUG ADDON: CPT

## 2021-01-01 PROCEDURE — 82306 VITAMIN D 25 HYDROXY: CPT

## 2021-01-01 PROCEDURE — 83615 LACTATE (LD) (LDH) ENZYME: CPT

## 2021-01-01 PROCEDURE — 77030005513 HC CATH URETH FOL11 MDII -B

## 2021-01-01 PROCEDURE — 87635 SARS-COV-2 COVID-19 AMP PRB: CPT

## 2021-01-01 PROCEDURE — 99285 EMERGENCY DEPT VISIT HI MDM: CPT

## 2021-01-01 PROCEDURE — 71045 X-RAY EXAM CHEST 1 VIEW: CPT

## 2021-01-01 PROCEDURE — P9047 ALBUMIN (HUMAN), 25%, 50ML: HCPCS | Performed by: FAMILY MEDICINE

## 2021-01-01 PROCEDURE — 74011000250 HC RX REV CODE- 250: Performed by: FAMILY MEDICINE

## 2021-01-01 PROCEDURE — C1751 CATH, INF, PER/CENT/MIDLINE: HCPCS

## 2021-01-01 PROCEDURE — 84443 ASSAY THYROID STIM HORMONE: CPT

## 2021-01-01 PROCEDURE — 93005 ELECTROCARDIOGRAM TRACING: CPT

## 2021-01-01 PROCEDURE — 82553 CREATINE MB FRACTION: CPT

## 2021-01-01 PROCEDURE — 85730 THROMBOPLASTIN TIME PARTIAL: CPT

## 2021-01-01 RX ORDER — SODIUM POLYSTYRENE SULFONATE 15 G/60ML
15 SUSPENSION ORAL; RECTAL
Status: DISCONTINUED | OUTPATIENT
Start: 2021-01-01 | End: 2021-01-01 | Stop reason: HOSPADM

## 2021-01-01 RX ORDER — ALBUMIN HUMAN 250 G/1000ML
25 SOLUTION INTRAVENOUS ONCE
Status: COMPLETED | OUTPATIENT
Start: 2021-01-01 | End: 2021-01-01

## 2021-01-01 RX ORDER — ETOMIDATE 2 MG/ML
40 INJECTION INTRAVENOUS
Status: COMPLETED | OUTPATIENT
Start: 2021-01-01 | End: 2021-01-01

## 2021-01-01 RX ORDER — CALCIUM GLUCONATE 20 MG/ML
2 INJECTION, SOLUTION INTRAVENOUS ONCE
Status: COMPLETED | OUTPATIENT
Start: 2021-01-01 | End: 2021-01-01

## 2021-01-01 RX ORDER — ACETAMINOPHEN 325 MG/1
650 TABLET ORAL
Status: DISCONTINUED | OUTPATIENT
Start: 2021-01-01 | End: 2021-01-01 | Stop reason: HOSPADM

## 2021-01-01 RX ORDER — ONDANSETRON 4 MG/1
4 TABLET, ORALLY DISINTEGRATING ORAL
Status: DISCONTINUED | OUTPATIENT
Start: 2021-01-01 | End: 2021-01-01 | Stop reason: HOSPADM

## 2021-01-01 RX ORDER — ACETAMINOPHEN 650 MG/1
650 SUPPOSITORY RECTAL
Status: DISCONTINUED | OUTPATIENT
Start: 2021-01-01 | End: 2021-01-01 | Stop reason: HOSPADM

## 2021-01-01 RX ORDER — DEXTROSE 50 % IN WATER (D50W) INTRAVENOUS SYRINGE
25 ONCE
Status: COMPLETED | OUTPATIENT
Start: 2021-01-01 | End: 2021-01-01

## 2021-01-01 RX ORDER — POLYETHYLENE GLYCOL 3350 17 G/17G
17 POWDER, FOR SOLUTION ORAL DAILY PRN
Status: DISCONTINUED | OUTPATIENT
Start: 2021-01-01 | End: 2021-01-01 | Stop reason: HOSPADM

## 2021-01-01 RX ORDER — SODIUM BICARBONATE 1 MEQ/ML
50 SYRINGE (ML) INTRAVENOUS ONCE
Status: COMPLETED | OUTPATIENT
Start: 2021-01-01 | End: 2021-01-01

## 2021-01-01 RX ORDER — LEVOFLOXACIN 5 MG/ML
500 INJECTION, SOLUTION INTRAVENOUS ONCE
Status: DISCONTINUED | OUTPATIENT
Start: 2021-01-01 | End: 2021-01-01 | Stop reason: HOSPADM

## 2021-01-01 RX ORDER — SODIUM CHLORIDE 0.9 % (FLUSH) 0.9 %
5-40 SYRINGE (ML) INJECTION AS NEEDED
Status: DISCONTINUED | OUTPATIENT
Start: 2021-01-01 | End: 2021-01-01 | Stop reason: HOSPADM

## 2021-01-01 RX ORDER — NOREPINEPHRINE BIT/0.9 % NACL 8 MG/250ML
.5-16 INFUSION BOTTLE (ML) INTRAVENOUS
Status: DISCONTINUED | OUTPATIENT
Start: 2021-01-01 | End: 2021-01-01 | Stop reason: HOSPADM

## 2021-01-01 RX ORDER — SODIUM CHLORIDE 0.9 % (FLUSH) 0.9 %
5-40 SYRINGE (ML) INJECTION EVERY 8 HOURS
Status: DISCONTINUED | OUTPATIENT
Start: 2021-01-01 | End: 2021-01-01 | Stop reason: HOSPADM

## 2021-01-01 RX ORDER — FUROSEMIDE 10 MG/ML
80 INJECTION INTRAMUSCULAR; INTRAVENOUS
Status: COMPLETED | OUTPATIENT
Start: 2021-01-01 | End: 2021-01-01

## 2021-01-01 RX ORDER — DEXAMETHASONE SODIUM PHOSPHATE 4 MG/ML
10 INJECTION, SOLUTION INTRA-ARTICULAR; INTRALESIONAL; INTRAMUSCULAR; INTRAVENOUS; SOFT TISSUE ONCE
Status: DISCONTINUED | OUTPATIENT
Start: 2021-01-01 | End: 2021-01-01

## 2021-01-01 RX ORDER — DEXAMETHASONE SODIUM PHOSPHATE 4 MG/ML
20 INJECTION, SOLUTION INTRA-ARTICULAR; INTRALESIONAL; INTRAMUSCULAR; INTRAVENOUS; SOFT TISSUE DAILY
Status: DISCONTINUED | OUTPATIENT
Start: 2021-01-01 | End: 2021-01-01 | Stop reason: HOSPADM

## 2021-01-01 RX ORDER — LEVOFLOXACIN 5 MG/ML
250 INJECTION, SOLUTION INTRAVENOUS EVERY 24 HOURS
Status: DISCONTINUED | OUTPATIENT
Start: 2021-09-27 | End: 2021-01-01 | Stop reason: HOSPADM

## 2021-01-01 RX ORDER — ONDANSETRON 2 MG/ML
4 INJECTION INTRAMUSCULAR; INTRAVENOUS
Status: DISCONTINUED | OUTPATIENT
Start: 2021-01-01 | End: 2021-01-01 | Stop reason: HOSPADM

## 2021-01-01 RX ORDER — ENOXAPARIN SODIUM 100 MG/ML
1 INJECTION SUBCUTANEOUS EVERY 12 HOURS
Status: DISCONTINUED | OUTPATIENT
Start: 2021-01-01 | End: 2021-01-01 | Stop reason: HOSPADM

## 2021-01-01 RX ORDER — VANCOMYCIN 2 GRAM/500 ML IN 0.9 % SODIUM CHLORIDE INTRAVENOUS
2000 ONCE
Status: DISCONTINUED | OUTPATIENT
Start: 2021-01-01 | End: 2021-01-01 | Stop reason: HOSPADM

## 2021-01-01 RX ORDER — ROCURONIUM BROMIDE 10 MG/ML
100 INJECTION, SOLUTION INTRAVENOUS
Status: COMPLETED | OUTPATIENT
Start: 2021-01-01 | End: 2021-01-01

## 2021-01-01 RX ADMIN — ROCURONIUM BROMIDE 100 MG: 50 INJECTION, SOLUTION INTRAVENOUS at 00:18

## 2021-01-01 RX ADMIN — INSULIN HUMAN 10 UNITS: 100 INJECTION, SOLUTION PARENTERAL at 02:58

## 2021-01-01 RX ADMIN — SODIUM BICARBONATE 50 MEQ: 84 INJECTION INTRAVENOUS at 02:53

## 2021-01-01 RX ADMIN — NITROGLYCERIN 1 INCH: 20 OINTMENT TOPICAL at 01:26

## 2021-01-01 RX ADMIN — ETOMIDATE INJECTION 40 MG: 2 SOLUTION INTRAVENOUS at 00:00

## 2021-01-01 RX ADMIN — DEXTROSE MONOHYDRATE 25 G: 25 INJECTION, SOLUTION INTRAVENOUS at 02:56

## 2021-01-01 RX ADMIN — DEXAMETHASONE SODIUM PHOSPHATE 20 MG: 4 INJECTION, SOLUTION INTRAMUSCULAR; INTRAVENOUS at 00:24

## 2021-01-01 RX ADMIN — PIPERACILLIN AND TAZOBACTAM 3.38 G: 3; .375 INJECTION, POWDER, LYOPHILIZED, FOR SOLUTION INTRAVENOUS at 02:59

## 2021-01-01 RX ADMIN — FUROSEMIDE 80 MG: 10 INJECTION, SOLUTION INTRAMUSCULAR; INTRAVENOUS at 01:12

## 2021-01-01 RX ADMIN — Medication 4 MCG/MIN: at 01:57

## 2021-01-01 RX ADMIN — CALCIUM GLUCONATE 2 G: 20 INJECTION, SOLUTION INTRAVENOUS at 02:33

## 2021-01-01 RX ADMIN — ALBUMIN (HUMAN) 25 G: 0.25 INJECTION, SOLUTION INTRAVENOUS at 02:21

## 2021-01-01 RX ADMIN — Medication 14 MCG/MIN: at 03:20

## 2021-01-01 RX ADMIN — ENOXAPARIN SODIUM 160 MG: 100 INJECTION SUBCUTANEOUS at 03:02

## 2021-01-01 RX ADMIN — SODIUM CHLORIDE 0.4 MCG/KG/HR: 9 INJECTION, SOLUTION INTRAVENOUS at 00:45

## 2021-01-21 NOTE — PROGRESS NOTES
Medical Weight Loss Multi-Disciplinary Program 
 
Name: Rose Mary Price   : 1980 Session# 3 Date: 2021 Visit Vitals Ht 5' 9.5\" (1.765 m) Wt (!) 159.2 kg (351 lb) BMI 51.09 kg/m² The patients weight loss goal pre operatively is 10-20 pounds. Dietary Instructions Reviewed intake Understanding low carbohydrates, low sugar, higher protein meals Instruction given for personal dietary changes Discussed perceived compliance Comments: Diet hx reviewed and personal dietary changes discussed. Reviewed recommendation to follow [id number] calorie diet, working to reduce total carbohydrate intake to  g or less per day and increasing protein intake to  g per day, compared current intake to recommendations. Today the majority of our visit was spent reviewing patient's food recall and identifying areas for improvement. Educated  on the importance of eating 3 meals/day at regularly scheduled times including breakfast within 1st 1-2 hours of waking. Suggested patient use a protein supplement as a meal replacement instead of skipping OR as a between meal high protein snack. Patient has been working on small changes this month, but still not consistently cooking and preparing meals -still not closely monitoring carbohydrate. He reports that he feels like support from spouse will assist with behavior changes. Discussed resources that were mailed to patient last month to assist with low carb swaps, meal and snack ideas and alternative beverage options. Utilized motivational interviewing to assist with setting behavior change goals this month 1/2021: Pt downloaded a food-tracking brett (carb manager), and is staying under 1400 kcal/day and states 100g of carb/day is too tough and is aiming for <200g/CHO/day - pt identifies coke as a major CHO source (drinking 1-2 coke/day). Pt has sampled Premier protein shakes. Pt identifies ways to reduce carbs this month: eliminate soda (56 g carb per soda), and eliminate extra carbohydrates in meals like bread on a tuna-sandwich. Sent post-op education materials. Pt agrees to eat string beans instead of green peas. Pt agrees to eliminate soda, reduce carbs from 200g/day to 100g/day and continue logging his food intake. Nutritional Hx: What is the number of meals you eat per day? 3 Comment: Do you eat between meals / snack? yes Typical snack: chips How fast do you eat your meals? rapid How often do you eat fast food? 3 times a week How many sodas/sugared beverages do you drink per day? >30 oz, used to drink 4-5 sodas/day and reduced 1-2/day (sipping on it all day long) How many caffeinated drinks do you have per day? Eliminated sweet tea How much milk and/or juice do you drink per day? Rarely has juice, occasionally milk with cereal  
 
How much water do you drink per day? 2-3 ( 16oz bottles ) (36-48 oz. Water) How often do you consume alcohol? None Current Vitamins: Magnesium, CoQ10, KCl Diet History: 
 
Typical intake is as follows: 
Breakfast:2 eggs with 3 turkey sausage  -eliminated sweet tea- replaced with [employer] Lunch: [employer] - Beef burrito supreme (50 g CHO) with Soft Taco Supreme - (20 g CHO) Snacks: Beef jerky OR Beef and cheese stick Dinner: Chicken fajitas in toritlla (Consumes 2 tortilla) OR 8 -10 oz baked salmon with peas PM : Snack - 2 slices of bread with michael and 1 banana Fluids:Hint water -16.9 oz 2-3 bottles per day, 20 oz soda - 2-3 times per week Reviewed intake Understanding low carbohydrates, low sugar, higher protein meals Understanding proper portions Dining outside home Instruction given for personal dietary changes Discussed perceived compliance Comments: Pt given brief pre/post-op diet ed and diet hx reviewed. Patient Education and Materials Provided: 
Supplement [facility], B Vitamin Information, MVI Recommendations, Calcium Citrate Information, Bariatric Supplement Companies, Protein Supplement Information, Fluid Requirements, No Caffeine or Carbonation, No Alcohol for One Year Post Op, 3 Balanced Meals a Day, Food Group Guide, Good Choices Dining Out, No Snacks, No Concentrated Sweets, Support System at Home, Exercising, Support Group Information and Addressed Current Habits / Changes to make Physical Activity/Exercise Discussed Perceived Compliance Reasonable Goals Set Motivation Comments: none Exercise: Do you currently have an exercise routine? no 
 
Goals:  
1. Work to increase to 3-4 small meals per day, with planned snacks as needed. Recommend following plate method for meal planning - focusing on lean protein, non-starchy vegetables, and measured amounts of starch. - Goal of  g protein and  g carbohydrate per day. - Recommend continuing protein supplement as meal replacement at least 1x/day 2. Increase non caloric fluid to 64 oz per day. Eliminate caffeine, added sugar, carbonation, and straws.  
            -Continue to work to decrease sugar sweetened beverages - goal of calorie free beverages only 
            -Must eliminate caffeine prior to surgery and avoid for ~6-8 weeks 3. Start activity regimen, work to increase ADL 
            -Try to start walking for at least 30-60  minutes 5 days per week 4. Start Complete MVI Candidate for surgery (per RD): PENDING Asa Held, ALISON

## 2021-06-08 NOTE — ROUTINE PROCESS
Bedside and Verbal shift change report given to VIET Oliveira RN (oncoming nurse) by Basia Aguila (offgoing nurse). Report included the following information SBAR, Kardex, Intake/Output and MAR. Tdap 3/20/2017- up to date. patient notified.

## 2021-08-03 PROBLEM — E03.9 HYPOTHYROIDISM: Status: RESOLVED | Noted: 2018-11-15 | Resolved: 2021-01-01

## 2021-08-03 PROBLEM — I10 HTN (HYPERTENSION): Status: RESOLVED | Noted: 2018-11-15 | Resolved: 2021-01-01

## 2021-09-25 NOTE — Clinical Note
Status[de-identified] INPATIENT [101]   Type of Bed: Intensive Care [6]   Cardiac Monitoring Required?: Yes   Inpatient Hospitalization Certified Necessary for the Following Reasons: 3.  Patient receiving treatment that can only be provided in an inpatient setting (further clarification in H&P documentation)   Admitting Diagnosis: Acute hypoxemic respiratory failure due to COVID-19 [facility]) [[id number]]   Admitting Physician: Ann Garciaargo [id number]   Attending Physician: Ann Cramer [[id number]]   Estimated Length of Stay: 7+ Midnights   Discharge Plan[de-identified] Extended Care Facility (e.g. Adult Home, Nursing Home, etc.)

## 2021-09-26 PROBLEM — R77.8 ELEVATED TROPONIN: Status: ACTIVE | Noted: 2021-01-01

## 2021-09-26 PROBLEM — J96.01 ACUTE HYPOXEMIC RESPIRATORY FAILURE DUE TO COVID-19 (HCC): Status: ACTIVE | Noted: 2021-01-01

## 2021-09-26 PROBLEM — U07.1 ACUTE RESPIRATORY DISTRESS SYNDROME (ARDS) DUE TO COVID-19 VIRUS (HCC): Status: ACTIVE | Noted: 2021-01-01

## 2021-09-26 PROBLEM — J96.01 ACUTE RESPIRATORY FAILURE WITH HYPOXIA (HCC): Status: ACTIVE | Noted: 2021-01-01

## 2021-09-26 PROBLEM — E87.5 ACUTE HYPERKALEMIA: Status: ACTIVE | Noted: 2021-01-01

## 2021-09-26 PROBLEM — Z87.891 FORMER HEAVY TOBACCO SMOKER: Status: ACTIVE | Noted: 2021-01-01

## 2021-09-26 PROBLEM — R57.9 SHOCK (HCC): Status: ACTIVE | Noted: 2021-01-01

## 2021-09-26 PROBLEM — F42.3 HOARDING BEHAVIOR: Status: ACTIVE | Noted: 2021-01-01

## 2021-09-26 PROBLEM — U07.1 ACUTE HYPOXEMIC RESPIRATORY FAILURE DUE TO COVID-19 (HCC): Status: ACTIVE | Noted: 2021-01-01

## 2021-09-26 PROBLEM — Z28.21 COVID-19 VACCINE DOSE DECLINED: Status: ACTIVE | Noted: 2021-01-01

## 2021-09-26 PROBLEM — R65.10 SIRS (SYSTEMIC INFLAMMATORY RESPONSE SYNDROME) (HCC): Status: ACTIVE | Noted: 2021-01-01

## 2021-09-26 PROBLEM — J80 ACUTE RESPIRATORY DISTRESS SYNDROME (ARDS) DUE TO COVID-19 VIRUS (HCC): Status: ACTIVE | Noted: 2021-01-01

## 2021-09-26 PROBLEM — D84.9 IMMUNOSUPPRESSED STATUS (HCC): Status: ACTIVE | Noted: 2021-01-01

## 2021-09-26 NOTE — DEATH NOTE
RN paged that pt had become asystolic. Death exam was performed revealing absent cardiac sounds x1 minute as well as absent corneal and pupillary reflexes. Time of death 5.  was at bedside to assist with grief management.

## 2021-09-26 NOTE — PROGRESS NOTES
Respiratory Therapists called to ER. Patient is intubated without incident. Unable to draw ABG with doppler. Dr. Brenda Nolan was at the bedside.

## 2021-09-26 NOTE — ED NOTES
Assumed care of pt;  Dr. Ghada Li at bedside with wife and daughter to discuss plan of care and code status;   Water and tissues brought to family for comfort; holding scheduled ordered meds at this time

## 2021-09-26 NOTE — PROGRESS NOTES
Wife and daughter at bedside. He is unvaccinated for COVID 19. His wife would like to make him a DNR. They will spend some time with him and let us know when they are ready for comfort care.  paged for additional support.

## 2021-09-26 NOTE — H&P
History & Physical    Patient: Todd Wills MRN: [id number]  CSN: [id number]    YOB: 1980  Age: 35 y.o. Sex: male      DOA: 9/25/2021  Primary Care Provider:  Jimmie Son MD      Assessment/Plan     Patient Active Problem List   Diagnosis Code    Psoriatic arthritis ([facility] L40.50    HAWA (obstructive sleep apnea) G47.33    Morbid obesity ([facility]) E66.01    Stage 3 chronic kidney disease ([facility] N18.30    Acute blood loss anemia D62    GI bleed K92.2    FSGS (focal segmental glomerulosclerosis) N05.1    Hypertension I10    Hyperlipidemia E78.5    CKD (chronic kidney disease) stage 5, GFR less than 15 ml/min ([facility]) N18.5    CAD (coronary artery disease) I25.10    Hypothyroid E03.9    Hiatal hernia K44.9    Obstructive sleep apnea G47.33    GERD (gastroesophageal reflux disease) K21.9    Super-super obese ([facility]) E66.01    Gastroparesis K31.84    Acute hypoxemic respiratory failure due to COVID-19 ([facility]) U07.1, J96.01    Elevated troponin R77.8    Acute hyperkalemia E87.5    Hoarding behavior F42.3    Acute respiratory distress syndrome (ARDS) due to COVID-19 virus ([facility]) U07.1, J80    Former heavy tobacco smoker Z80.0     25-year-old male with super morbid obesity, hypertension, FSGS, hypothyroidism, HAWA, CAD, CKD stage V, HLD, former heavy tobacco smoker, and psoriatic arthritis who is immunosuppressed is admitted to the ICU for acute respiratory distress syndrome due to COVID-19 virus with profound and refractory hypoxemia and hyperkalemia. Due to hoarding behavior, he was a prolonged extrication by the fire department from his home with prolonged hypoxia prior to arrival to the hospital.  He is actively dying in the emergency department and will not survive this critical illness. He has maximized on ventilatory support and is not a candidate for transfer as he is unstable.   I have attempted to contact his wife numerous times to discuss comfort measures but she is not answering her phone. Neurolikely with anoxic brain injury already given ongoing hypoxia. He is on precedex for sedation, started by the ED physician. Pulmonarysevere ARDS with refractory hypoxemia on maximum ventilatory settings. CVhas a history of hypertension but after being placed on sedation, he became hypotensive. Levophed is ordered to maintain MAP near 65. Elevated troponin is most likely due to critical illness rather than ACS. He will be placed on therapeutic lovenox. GIno issues  RenalCKD stage V with hyperkalemia. Is not a candidate for dialysis as he is unstable. Will give calcium gluconate and manage medically. Hemeno issues  IDsevere ARDS due to COVID-19 pneumonia. I will consult infectious disease. He is not a candidate for remdesivir due to severe kidney impairment and transaminitis from COVID. I have started dexamethasone 20 mg IV x5 days for COVID ARDS protocol. I will treat him with Lovenox 1 mg/kg as he is not stable enough to get a CT scan of the chest to evaluate for pulmonary embolism. Endohypothyroidism with super morbid obesity. Due to chronic steroids and immunosuppressants, he will likely be hyperglycemic. I will place sliding scale insulin orders and check hemoglobin A1c. F/E/Nalbumin if needed/replete as needed/n.p.o. Full code    Multiple attempts of been made to reach his wife but she is not answering her phone. He is terminally ill and will likely not survive tonight. Prophylaxis: pepcid IV, therapeutic lovenox SQ    Estimated length of stay : 5 nights (death anticipated prior to this)    Karel Portillo MD  Nocturnist    Critical Care Time [id number]  120 minutes of critical care time spent in the direct evaluation and treatment of this high risk patient.  The reason for providing this level of medical care for this critically ill patient was due a critical illness that impaired one or more vital organ systems such that there was a high probability of imminent or life threatening deterioration in the patients condition. This care involved high complexity decision making to assess, manipulate, and support vital system functions, to treat this degreee vital organ system failure and to prevent further life threatening deterioration of the patients condition.  ----------------------------------------------------------------------------------------------------------------------------------------------------------------------------  CC: COVID+, acute respiratory failure       HPI:     Bandar Flores is a 35 y.o. male with super morbid obesity, hypertension, FSGS, hypothyroidism, HAWA, CAD, CKD stage V, HLD, former heavy tobacco smoker, and psoriatic arthritis who is immunosuppressed who presents via EMS for severe and refractory hypoxemia. He and his wife are hoarders, so there was a prolonged extrication to bring him to the hospital.  Despite CPAP, he was satting in the 60s in route. He was immediately intubated in the emergency department but his oxygen saturations will not rise above 70% despite maximum ventilatory settings. The ED physician attempted to transfer him but he is too unstable for transfer and there are no beds available at surrounding hospitals for him. Additionally, he is held in the ED as there are currently no ICU beds at this hospital. History is unobtainable from the patient as he is intubated and sedated.     Past Medical History:   Diagnosis Date    ADD (attention deficit disorder)     CAD (coronary artery disease)     stent to distal RCA 4/2019, Dr Lisa Fragoso    Chest tightness 4/14/2011    Chronic kidney disease     Crt 3.33 8/2020, followed by Dr Stephen Ayon on daily prednisone    Fatigue     Gastroparesis     gastric emptying study March 2019    GERD (gastroesophageal reflux disease)     on PPI    Hiatal hernia     Hx of ulcer disease     chronic prepyloric ulcer, bleeding ascending colon ulcer 2019    Hyperlipidemia     Hypertension     Hypothyroid     Morbid obesity ([facility]     Morbid obesity with BMI of 45.0-49.9, adult ([facility] 2020    Morbidly obese ([facility] 11/15/2018    Obstructive sleep apnea     not using CPAP    Palpitation 2011    Psoriatic arthritis (HCC)     fingers, knees, ankles    SOB (shortness of breath) on exertion 2011       Past Surgical History:   Procedure Laterality Date    COLONOSCOPY N/A 2018    COLONOSCOPY; BIOPSY; APC OF BLEEDING ULCER; POLYPECTOMY performed by Lisa Dhillon MD at [facility] ENDOSCOPY    HX TONSIL AND ADENOIDECTOMY      HX [place] UNLIST      cardiac stent placement 18 months ago- Dr José Miguel Munoz       Family History   Problem Relation Age of Onset    Heart Attack Mother 36    Heart Attack Father 48    Heart Surgery Father 48       Social History     Socioeconomic History    Marital status:      Spouse name: Not on file    Number of children: Not on file    Years of education: Not on file    Highest education level: Not on file   Tobacco Use    Smoking status: Former Smoker     Packs/day: 2.00     Years: 18.00     Pack years: 36.00     Types: Cigarettes     Quit date: 2008     Years since quittin.7    Smokeless tobacco: Never Used   Substance and Sexual Activity    Alcohol use: Yes     Comment: 2 times a year    Drug use: No    Sexual activity: Yes     Partners: Female     Social Determinants of Health     Financial Resource Strain:     Difficulty of Paying Living Expenses:    Food Insecurity:     Worried About Running Out of Food in the Last Year:     Ran Out of Food in the Last Year:    Transportation Needs:     Lack of Transportation (Medical):      Lack of Transportation (Non-Medical):    Physical Activity:     Days of Exercise per Week:     Minutes of Exercise per Session:    Stress:     Feeling of Stress :    Social Connections:     Frequency of Communication with Friends and Family:     Frequency of Social Gatherings with Friends and Family:     Attends Orthodoxy Services:     Active Member of Clubs or Organizations:     Attends Club or Organization Meetings:     Marital Status:        Prior to Admission medications    Medication Sig Start Date End Date Taking? Authorizing Provider   ixekizumab (TALTZ AUTOINJECTOR, 2 PACK, SC) by SubCUTAneous route. Provider, Historical   buPROPion XL (WELLBUTRIN XL) 150 mg tablet Take 150 mg by mouth daily. 6/27/20   Provider, Historical   clopidogreL (Plavix) 75 mg tab Take  by mouth. 4/21/20   Provider, Historical   aspirin-calcium carbonate 81 mg-300 mg calcium(777 mg) tab Take 81 mg by mouth daily. 4/11/19   Provider, Historical   LORazepam (ATIVAN) 1 mg tablet Take 1 mg by mouth every six (6) hours as needed. Provider, Historical   metoclopramide HCl (REGLAN) 10 mg tablet Take 10 mg by mouth Before breakfast, lunch, and dinner. Indications: gastroesophageal reflux disease    Provider, Historical   pantoprazole (PROTONIX) 40 mg tablet Take 40 mg by mouth daily. Indications: gastroesophageal reflux disease    Provider, Historical   sertraline (ZOLOFT) 100 mg tablet Take 100 mg by mouth daily. Indications: major depressive disorder    Provider, Historical   pantoprazole (PROTONIX) 40 mg tablet Take 1 Tab by mouth Before breakfast and dinner. 1/1/19   Jeffrey Moyer MD   albuterol (PROAIR HFA) 90 mcg/actuation inhaler Take 2 Puffs by inhalation every six (6) hours as needed for Wheezing. Indications: Bronchospasm Prevention    Provider, Historical   metoprolol succinate (TOPROL XL) 100 mg tablet Take 50 mg by mouth two (2) times a day. Indications: high blood pressure    Provider, Historical   levothyroxine (SYNTHROID) 150 mcg tablet Take 125 mcg by mouth nightly. Indications: hypothyroidism    Provider, Historical   co-enzyme Q-10 (CO Q-10) 100 mg capsule Take 200 mg by mouth daily (with dinner).  Indications: mineral supplement    Provider, Historical latanoprost (XALATAN) 0.005 % ophthalmic solution Administer 1 Drop to both eyes nightly. Indications: increased pressure in the eye 12/5/18   Provider, Historical   predniSONE (DELTASONE) 20 mg tablet Take 10 mg by mouth every other day. taking 1/2 a tab or 5 mg tabs. Indications: inflammation of the nose due to an allergy 1/10/19   Other, MD Angeline   loperamide (IMMODIUM) 2 mg tablet Take 4 mg by mouth every twelve (12) hours every twelve (12) hours for Diarrhea. 3am and 3pm with cellcept dose  Indications: diarrhea    Provider, Historical   magnesium oxide (MAG-OX) 400 mg tablet Take 400 mg by mouth daily (with lunch). Indications: low amount of magnesium in the blood    Provider, Historical       Allergies   Allergen Reactions    Animal Dander Rash       Review of Systems  Unable to obtain due to patient condition      Physical Exam:     Physical Exam:  Visit Vitals  BP 95/62   Pulse (!) 105   Resp 16   Wt (!) 167.6 kg (369 lb 6.4 oz)   SpO2 (!) 67%   BMI 53.77 kg/m²    O2 Flow Rate (L/min): 15 l/min O2 Device: Endotracheal tube, Bag/valve mask (BVM)    No data recorded. No intake/output data recorded. No intake/output data recorded. General:  Intubated, ventilated, sedated. Head:  Normocephalic, without obvious abnormality, atraumatic. Eyes:  Conjunctivae/corneas clear, sclera anicteric. Neck: Supple, symmetrical, trachea midline. Lungs:   Breath sounds difficult to appreciate due to body habitus, diminished bilaterally. Heart:  Tachycardic, regular rhythm, S1, S2 normal, no murmur, click, rub or gallop. Abdomen: Soft, super morbidly obese, non-tender. Bowel sounds normal. Unable to assess for organomegaly. Extremities: Extremities normal, atraumatic, no cyanosis or edema. Capillary refill normal.   Pulses: 1+ and symmetric all extremities. Skin: Skin color mottled, turgor increased. Diffuse large striae all over trunk and chest.   Neurologic: Intubated and sedated.        Labs Reviewed: All lab results for the last 24 hours reviewed. Recent Results (from the past 24 hour(s))   CBC WITH AUTOMATED DIFF    Collection Time: 09/25/21 11:45 PM   Result Value Ref Range    WBC 4.8 4.6 - 13.2 K/uL    RBC 5.38 4.35 - 5.65 M/uL    HGB 13.8 13.0 - 16.0 g/dL    HCT 47.2 36.0 - 48.0 %    MCV 87.7 78.0 - 100.0 FL    MCH 25.7 24.0 - 34.0 PG    MCHC 29.2 (L) 31.0 - 37.0 g/dL    RDW 15.5 (H) 11.6 - 14.5 %    PLATELET 277 727 - 743 K/uL    MPV 10.6 9.2 - 11.8 FL    NEUTROPHILS 60 40 - 73 %    BAND NEUTROPHILS 16 (H) 0 - 5 %    LYMPHOCYTES 20 (L) 21 - 52 %    MONOCYTES 3 3 - 10 %    EOSINOPHILS 1 0 - 5 %    BASOPHILS 0 0 - 2 %    ABS. NEUTROPHILS 3.7 1.8 - 8.0 K/UL    ABS. LYMPHOCYTES 1.0 0.9 - 3.6 K/UL    ABS. MONOCYTES 0.1 0.05 - 1.2 K/UL    ABS. EOSINOPHILS 0.0 0.0 - 0.4 K/UL    ABS. BASOPHILS 0.0 0.0 - 0.1 K/UL    DF MANUAL      PLATELET COMMENTS ADEQUATE PLATELETS      RBC COMMENTS NORMOCYTIC, NORMOCHROMIC     METABOLIC PANEL, COMPREHENSIVE    Collection Time: 09/25/21 11:45 PM   Result Value Ref Range    Sodium 140 136 - 145 mmol/L    Potassium 6.1 (HH) 3.5 - 5.5 mmol/L    Chloride 112 (H) 100 - 111 mmol/L    CO2 12 (L) 21 - 32 mmol/L    Anion gap 16 3.0 - 18 mmol/L    Glucose 160 (H) 74 - 99 mg/dL    BUN 46 (H) 7.0 - 18 MG/DL    Creatinine 6.86 (H) 0.6 - 1.3 MG/DL    BUN/Creatinine ratio 7 (L) 12 - 20      GFR est AA 11 (L) >60 ml/min/1.73m2    GFR est non-AA 9 (L) >60 ml/min/1.73m2    Calcium 7.8 (L) 8.5 - 10.1 MG/DL    Bilirubin, total 0.5 0.2 - 1.0 MG/DL    ALT (SGPT) 111 (H) 16 - 61 U/L    AST (SGOT) 129 (H) 10 - 38 U/L    Alk.  phosphatase 133 (H) 45 - 117 U/L    Protein, total 6.3 (L) 6.4 - 8.2 g/dL    Albumin 2.0 (L) 3.4 - 5.0 g/dL    Globulin 4.3 (H) 2.0 - 4.0 g/dL    A-G Ratio 0.5 (L) 0.8 - 1.7     MAGNESIUM    Collection Time: 09/25/21 11:45 PM   Result Value Ref Range    Magnesium 2.5 1.6 - 2.6 mg/dL   PROTHROMBIN TIME + INR    Collection Time: 09/25/21 11:45 PM   Result Value Ref Range Prothrombin time 14.2 11.5 - 15.2 sec    INR 1.2 0.8 - 1.2     PTT    Collection Time: 09/25/21 11:45 PM   Result Value Ref Range    aPTT 39.7 (H) 23.0 - 36.4 SEC   FIBRINOGEN    Collection Time: 09/25/21 11:45 PM   Result Value Ref Range    Fibrinogen 740 (H) 210 - 451 mg/dL   D DIMER    Collection Time: 09/25/21 11:45 PM   Result Value Ref Range    D DIMER 1.97 (H) <0.46 ug/ml(FEU)   CARDIAC PANEL,(CK, CKMB & TROPONIN)    Collection Time: 09/25/21 11:45 PM   Result Value Ref Range    CK - MB 37.2 (H) <3.6 ng/ml    CK-MB Index 6.7 (H) 0.0 - 4.0 %     (H) 39 - 308 U/L    Troponin-I, QT 1.52 (HH) 0.0 - 0.045 NG/ML   NT-PRO BNP    Collection Time: 09/25/21 11:45 PM   Result Value Ref Range    NT pro-BNP 1,102 (H) 0 - 450 PG/ML   COVID-19 RAPID TEST    Collection Time: 09/26/21 12:15 AM   Result Value Ref Range    Specimen source Nasopharyngeal      COVID-19 rapid test Detected (AA) NOTD     EKG, 12 LEAD, INITIAL    Collection Time: 09/26/21  1:11 AM   Result Value Ref Range    Ventricular Rate 113 BPM    Atrial Rate 113 BPM    P-R Interval 116 ms    QRS Duration 96 ms    Q-T Interval 344 ms    QTC Calculation (Bezet) 471 ms    Calculated P Axis 55 degrees    Calculated R Axis 99 degrees    Calculated T Axis 90 degrees    Diagnosis       Sinus tachycardia  Possible Left atrial enlargement  Rightward axis  Possible Inferior infarct (cited on or before 26-DEC-2018)  Abnormal ECG  When compared with ECG of 26-DEC-2018 20:16,  Questionable change in initial forces of Inferior leads  ST elevation now present in Inferior leads  T wave amplitude has increased in Anterior leads       Results  XR CHEST PORT (Accession [id number]) (Order [id number])  Allergies     Not Specified: Animal Dander   Exam Information    Status Exam Begun  Exam Ended    Final [99] 9/26/2021 00:35 9/26/2021 12:47 AM [id number] 12:47 AM   Result Information    Status: Final result (Exam End: 9/26/2021 00:47) Provider Status: Open   Study Result    Narrative & Impression   ---------------------------------------------------------------------------  <<<<<<<<<           [facility]           >>>>>>>>>   ---------------------------------------------------------------------------     CLINICAL HISTORY:  Respiratory failure.     COMPARISON EXAMINATIONS:  November 15, 2018.        ---  SINGLE FRONTAL VIEW OF THE CHEST  ---     The lung volumes are low. There is an endotracheal tube seen in good position  above the silas. A gastric tube extends below the diaphragm into the upper  abdomen. The tip of the gastric tube is not seen. A right central line  terminates at the level of the upper SVC. There are bilateral infiltrates  extending from the upper to lower lung fields sparing the lung apices.  No  significant osseous abnormalities are identified.          --------------  IMPRESSION  --------------     Endotracheal tube, gastric tube and right central line in place.     Extensive bilateral infiltrates consistent with pneumonia.

## 2021-09-26 NOTE — ED NOTES
Pt bedside monitor shows asystole and nurse unable to any heart sounds via ausculation with stethoscope;  Dr. Penn Breeding aware and to bedside to pronounce pt;   Jones at bedside for comfort;

## 2021-09-26 NOTE — PROGRESS NOTES
Family discussion    I attempted to call his wife, Lili Fairbanks, as the patient is very unstable and near death. Oxygen saturations are maintaining in the upper 60s despite maximum ventilator settings. I left a voicemail for her to call the ED immediately. Will continue to try calling as comfort measures would be best at this time.

## 2021-09-26 NOTE — PROGRESS NOTES
Bereavement Note:     responded to the death of Micheline Couch, who is a 35 y.o., male, offering Spiritual Care to patient and family, see flow sheets for interventions.  provided support to the family. There is no  home in place wife still needs time to plan. Wife was also admitted as patient to the hospital.        21 0526   Consult Information   Reason for Consult Death, Inpatient   Callback Yes   Time In 0430   Time Out 0525   Total Time (in minutes) 55   Pastoral Interventions   Family/Friend(s) Affirmation of emotions/emotional suffering;Catharsis/review of pertinent events in supportive environment;Coping skills reviewed/reinforced;Decision support (comment); End of life issues discussed; /memorial planning;Guided imagery;Guidance concerning next steps/process to be expected; Iconic (affirming the presence of God/Higher Power); Prayer (assurance of); Spiritual materials provided (comment); Spiritism beliefs/image of God discussed   Follow up Date 21  (IV-DEATH-PHS)   Dashboard Data Collection   Present at death within dashboard parameters Yes   Critical care assessment within 60 hours of arrival Yes       Date of Death:21    Extended Emergency Contact Information  Primary Emergency Contact: Madhuri Corea  Address: [address] [place] [address],[address] [place] [address] Phone: [phone]  Mobile Phone: [phone]  Relation: Spouse                 YES      NO  UNKNOWN  Life Net   []        [x]    []   Eye Bank   [] [x] []   Medical Examiner  []        [x]  []   Going to [employer]  [x]        [] []      Autopsy   []        [x]         []   Sympathy Card  []        [x]  Bereavement Materials  [x]        []           Business Card Provided  [x]        []              Home: N/A    Chaplains will continue to follow family and will provide spiritual care as needed. Rev. Haylee Mathew.  Deny Teixeira, [address] [place] [address] Office [phone]  Pager :[phone]

## 2021-09-26 NOTE — DISCHARGE SUMMARY
Discharge Summary    Patient: Rossana Ibarra MRN: [id number]  CSN: [id number]    YOB: 1980  Age: 35 y.o.   Sex: male    DOA: 9/25/2021 LOS:  LOS: 0 days   Discharge Date:      Primary Care Provider:  Arnulfo Velasco MD    Admission Diagnoses: [facility]emic [facility] [U07.1, J96.01]    Discharge Diagnoses:    Problem List as of 9/26/2021 Date Reviewed: 9/26/2021        Codes Class Noted - Resolved    Acute hypoxemic respiratory failure due to COVID-19 [facility]) ICD-10-CM: U07.1, J96.01  ICD-9-CM: 518.81, 079.89, 799.02  9/26/2021 - Present        Elevated troponin ICD-10-CM: R77.8  ICD-9-CM: 790.6  9/26/2021 - Present        Acute hyperkalemia ICD-10-CM: E87.5  ICD-9-CM: 276.7  9/26/2021 - Present        Hoarding behavior ICD-10-CM: F42.3  ICD-9-CM: 300.3  9/26/2021 - Present        * (Principal) Acute respiratory distress syndrome (ARDS) due to COVID-19 virus [facility]) ICD-10-CM: U07.1, J80  ICD-9-CM: 518.82, 079.89  9/26/2021 - Present        Former heavy tobacco smoker ICD-10-CM: Z87.891  ICD-9-CM: V15.82  9/26/2021 - Present        Immunosuppressed status ([facility] ICD-10-CM: D84.9  ICD-9-CM: 279.9  9/26/2021 - Present        COVID-19 vaccine dose declined ICD-10-CM: Z28.21  ICD-9-CM: V64.06  9/26/2021 - Present        Gastroparesis ICD-10-CM: K31.84  ICD-9-CM: 536.3  Unknown - Present    Overview Signed 12/7/2020  1:25 PM by Chante Woodward NP     gastric emptying study March 2019             Hypertension ICD-10-CM: I10  ICD-9-CM: 401.9  Unknown - Present        Hyperlipidemia ICD-10-CM: E78.5  ICD-9-CM: 272.4  Unknown - Present        CKD (chronic kidney disease) stage 5, GFR less than 15 ml/min (HCC) ICD-10-CM: N18.5  ICD-9-CM: 585.5  Unknown - Present        CAD (coronary artery disease) ICD-10-CM: I25.10  ICD-9-CM: 414.00  Unknown - Present    Overview Signed 9/23/2020  1:00 PM by Chante Woodward NP     stent to distal RCA 4/2019             Hypothyroid ICD-10-CM: E03.9  ICD-9-CM: 244.9  Unknown - Present        Hiatal hernia ICD-10-CM: K44.9  ICD-9-CM: 553.3  Unknown - Present        Obstructive sleep apnea ICD-10-CM: G47.33  ICD-9-CM: 327.23  Unknown - Present        GERD (gastroesophageal reflux disease) ICD-10-CM: K21.9  ICD-9-CM: 530.81  Unknown - Present    Overview Signed 9/23/2020  1:20 PM by Eli Cantrell NP     on PPI             Super-super obese ([facility] ICD-10-CM: E66.01  ICD-9-CM: 278.00  9/23/2020 - Present        Acute blood loss anemia ICD-10-CM: D62  ICD-9-CM: 285.1  12/26/2018 - Present        GI bleed ICD-10-CM: K92.2  ICD-9-CM: 578.9  12/26/2018 - Present        FSGS (focal segmental glomerulosclerosis) ICD-10-CM: N05.1  ICD-9-CM: 582.1  12/26/2018 - Present        HAWA (obstructive sleep apnea) ICD-10-CM: G47.33  ICD-9-CM: 327.23  11/15/2018 - Present        Stage 3 chronic kidney disease ([facility] ICD-10-CM: N18.30  ICD-9-CM: 585.3  11/15/2018 - Present        Psoriatic arthritis ([facility] ICD-10-CM: L40.50  ICD-9-CM: 696.0  Unknown - Present        RESOLVED: Pericardial effusion ICD-10-CM: I31.3  ICD-9-CM: 423.9  11/16/2018 - 12/26/2018        RESOLVED: Orthostatic hypotension ICD-10-CM: I95.1  ICD-9-CM: 458.0  11/15/2018 - 12/26/2018        RESOLVED: Dyspnea on exertion ICD-10-CM: R06.00  ICD-9-CM: 786.09  11/15/2018 - 12/26/2018        RESOLVED: HTN (hypertension) ICD-10-CM: I10  ICD-9-CM: 401.9  11/15/2018 - 8/3/2021        RESOLVED: Hypothyroidism ICD-10-CM: E03.9  ICD-9-CM: 244.9  11/15/2018 - 8/3/2021        RESOLVED: Fatigue ICD-10-CM: R53.83  ICD-9-CM: 780.79  Unknown - 12/26/2018        RESOLVED: Chest tightness ICD-10-CM: R07.89  ICD-9-CM: 786.59  4/14/2011 - 12/26/2018        RESOLVED: SOB (shortness of breath) on exertion ICD-10-CM: R06.02  ICD-9-CM: 786.05  4/14/2011 - 12/26/2018        RESOLVED: Palpitation ICD-10-CM: R00.2  ICD-9-CM: 785.1  4/14/2011 - 12/26/2018              Discharge Medications:     Current Discharge Medication List Discharge Condition:     Procedures : intubation and central line placement by the ED physician on     Consults: none      PHYSICAL EXAM   Visit Vitals  BP (!) 83/44   Pulse 93   Temp 96.8 °F (36 °C)   Resp 16   Ht 5' 9.5\" (1.765 m)   Wt (!) 167.4 kg (369 lb)   SpO2 (!) 66%   BMI 53.71 kg/m²     Death exam revealed absent cardiac sounds x1 minute as well as absent corneal and pupillary reflexes. Admission HPI : Catrachito Bro is a 35 y.o. male with super morbid obesity, hypertension, FSGS, hypothyroidism, HAWA, CAD, CKD stage V, HLD, former heavy tobacco smoker, and psoriatic arthritis who is immunosuppressed who presents via EMS for severe and refractory hypoxemia. He and his wife are hoarders, so there was a prolonged extrication to bring him to the hospital.  Despite CPAP, he was satting in the 60s in route. He was immediately intubated in the emergency department but his oxygen saturations will not rise above 70% despite maximum ventilatory settings. The ED physician attempted to transfer him but he is too unstable for transfer and there are no beds available at surrounding hospitals for him. Additionally, he is held in the ED as there are currently no ICU beds at this hospital. History is unobtainable from the patient as he is intubated and sedated.     Hospital Course : The patient was intubated and placed on the ventilator due to severe acute respiratory distress syndrome secondary to COVID-19. He was unfortunately unvaccinated for COVID-19 with many underlying medical comorbidities. His oxygen saturations never henry above 70% despite maximum ventilatory settings. He was medically treated for hyperkalemia. His wife desired to make him DNR. Before a decision regarding comfort care could be made, the patient deteriorated into asystole. Time of death was . The  was present at bedside, along with both his wife and daughter.      Disposition:     Minutes spent on discharge: 25 minutes       Labs: Results:       Chemistry Recent Labs     21   *      K 6.1*   *   CO2 12*   BUN 46*   CREA 6.86*   CA 7.8*   AGAP 16   BUCR 7*   *   TP 6.3*   ALB 2.0*   GLOB 4.3*   AGRAT 0.5*      CBC w/Diff Recent Labs     21   WBC 4.8   RBC 5.38   HGB 13.8   HCT 47.2      GRANS 60   LYMPH 20*   EOS 1      Cardiac Enzymes Recent Labs     21   *   CKND1 6.7*      Coagulation Recent Labs     21   PTP 14.2   INR 1.2   APTT 39.7*       Lipid Panel Lab Results   Component Value Date/Time    Cholesterol, total 231 (H) 2021 08:22 AM    HDL Cholesterol 38 (L) 2021 08:22 AM    LDL, calculated 147.4 (H) 2021 08:22 AM    VLDL, calculated 45.6 2021 08:22 AM    Triglyceride 228 (H) 2021 08:22 AM    CHOL/HDL Ratio 6.1 (H) 2021 08:22 AM      BNP No results for input(s): BNPP in the last 72 hours. Liver Enzymes Recent Labs     21   TP 6.3*   ALB 2.0*   *      Thyroid Studies Lab Results   Component Value Date/Time    TSH 1.59 2021 08:22 AM            Significant Diagnostic Studies: XR CHEST PORT    Result Date: 2021  --------------------------------------------------------------------------- <<<<<<<<<           [facility]           >>>>>>>>> --------------------------------------------------------------------------- CLINICAL HISTORY:  Respiratory failure. COMPARISON EXAMINATIONS:  November 15, 2018. ---  SINGLE FRONTAL VIEW OF THE CHEST  --- The lung volumes are low. There is an endotracheal tube seen in good position above the silas. A gastric tube extends below the diaphragm into the upper abdomen. The tip of the gastric tube is not seen. A right central line terminates at the level of the upper SVC. There are bilateral infiltrates extending from the upper to lower lung fields sparing the lung apices.  No significant osseous abnormalities are identified.  --------------    -------------- Endotracheal tube, gastric tube and right central line in place. Extensive bilateral infiltrates consistent with pneumonia. No results found for this or any previous visit.         CC: Gideon Macario MD

## 2021-09-26 NOTE — ED PROVIDER NOTES
EMERGENCY DEPARTMENT HISTORY AND PHYSICAL EXAM    Date: 9/25/2021  Patient Name: Elton Martin    History of Presenting Illness     Chief Complaint   Patient presents with    Respiratory Distress         History Provided By: EMS    Elton Martin is a 35 y.o. male who presents to the emergency department C/O respiratory failure, possible Covid. Patient arrives by EMS for this problem. At this time the patient is in significant respiratory distress unable to provide history on his own. EMS was able to get some history from his wife at home. States that he has history of morbid obesity, chronic kidney disease, sleep apnea, coronary artery disease, hypertension, hyperlipidemia. EMS states that the patient's wife had found out she was positive for Covid several days ago and the patient symptoms have been progressively worsening for last several days as well. EMS notes upon their arrival the patient was pale in significant respiratory distress and his oxygenation was 50% on room air. They stated they placed him on CPAP but was only able to get his oxygenation up to 60% on 100% oxygen. They do state that the patient is full code.   EMS did note that the patient was somnolent but was able to answer some questions      PCP: Obed Mayfield MD    Current Facility-Administered Medications   Medication Dose Route Frequency Provider Last Rate Last Admin    NOREPINephrine (LEVOPHED) 8 mg in 0.9% NS 250ml infusion  0.5-16 mcg/min IntraVENous TITRATE Gilbert Albrecht DO        dexamethasone (DECADRON) 4 mg/mL injection 20 mg  20 mg IntraVENous DAILY Sherrie Cortez MD   20 mg at 09/26/21 0024    dexmedeTOMidine (PRECEDEX) 400 mcg in 0.9% sodium chloride 100 mL infusion  0.1-1.5 mcg/kg/hr (Order-Specific) IntraVENous TITRATE Nanda MENDOZA DO 19.9 mL/hr at 09/26/21 0140 0.5 mcg/kg/hr at 09/26/21 0140    sodium chloride (NS) flush 5-40 mL  5-40 mL IntraVENous Q8H Sherrie Cortez MD        sodium chloride (NS) flush 5-40 mL  5-40 mL IntraVENous PRN Yoandy Richards MD        acetaminophen (TYLENOL) tablet 650 mg  650 mg Oral Q6H PRN Yoandy Richards MD        Or    acetaminophen (TYLENOL) suppository 650 mg  650 mg Rectal Q6H PRN Yoandy Richards MD        polyethylene glycol (MIRALAX) packet 17 g  17 g Oral DAILY PRN Yoandy Richards MD        ondansetron (ZOFRAN ODT) tablet 4 mg  4 mg Oral Q8H PRN Yoandy Richards MD        Or    ondansetron [facility]) injection 4 mg  4 mg IntraVENous Q6H PRN Yoandy Richards MD        famotidine (PF) (PEPCID) 20 mg in 0.9% sodium chloride 10 mL injection  20 mg IntraVENous BID Yoandy Richards MD         Current Outpatient Medications   Medication Sig Dispense Refill    ixekizumab (TALTZ AUTOINJECTOR, 2 PACK, SC) by SubCUTAneous route.  buPROPion XL (WELLBUTRIN XL) 150 mg tablet Take 150 mg by mouth daily.  clopidogreL (Plavix) 75 mg tab Take  by mouth.  aspirin-calcium carbonate 81 mg-300 mg calcium(777 mg) tab Take 81 mg by mouth daily.  LORazepam (ATIVAN) 1 mg tablet Take 1 mg by mouth every six (6) hours as needed.  metoclopramide HCl (REGLAN) 10 mg tablet Take 10 mg by mouth Before breakfast, lunch, and dinner. Indications: gastroesophageal reflux disease      pantoprazole (PROTONIX) 40 mg tablet Take 40 mg by mouth daily. Indications: gastroesophageal reflux disease      sertraline (ZOLOFT) 100 mg tablet Take 100 mg by mouth daily. Indications: major depressive disorder      pantoprazole (PROTONIX) 40 mg tablet Take 1 Tab by mouth Before breakfast and dinner. 60 Tab 0    albuterol (PROAIR HFA) 90 mcg/actuation inhaler Take 2 Puffs by inhalation every six (6) hours as needed for Wheezing. Indications: Bronchospasm Prevention      metoprolol succinate (TOPROL XL) 100 mg tablet Take 50 mg by mouth two (2) times a day.  Indications: high blood pressure      levothyroxine (SYNTHROID) 150 mcg tablet Take 125 mcg by mouth nightly. Indications: hypothyroidism      co-enzyme Q-10 (CO Q-10) 100 mg capsule Take 200 mg by mouth daily (with dinner). Indications: mineral supplement      latanoprost (XALATAN) 0.005 % ophthalmic solution Administer 1 Drop to both eyes nightly. Indications: increased pressure in the eye  5    predniSONE (DELTASONE) 20 mg tablet Take 10 mg by mouth every other day. taking 1/2 a tab or 5 mg tabs. Indications: inflammation of the nose due to an allergy      loperamide (IMMODIUM) 2 mg tablet Take 4 mg by mouth every twelve (12) hours every twelve (12) hours for Diarrhea. 3am and 3pm with cellcept dose  Indications: diarrhea      magnesium oxide (MAG-OX) 400 mg tablet Take 400 mg by mouth daily (with lunch).  Indications: low amount of magnesium in the blood         Past History     Past Medical History:  Past Medical History:   Diagnosis Date    ADD (attention deficit disorder)     CAD (coronary artery disease)     stent to distal RCA 4/2019, Dr Boone Nancy    Chest tightness 4/14/2011    Chronic kidney disease     Crt 3.33 8/2020, followed by Dr Kale Piña on daily prednisone    Fatigue     Gastroparesis     gastric emptying study March 2019    GERD (gastroesophageal reflux disease)     on PPI    Hiatal hernia     Hx of ulcer disease     chronic prepyloric ulcer, bleeding ascending colon ulcer 2019    Hyperlipidemia     Hypertension     Hypothyroid     [facility]     Morbid obesity with BMI of 45.0-49.9, adult ([facility] [address] 9/23/2020    Morbidly obese ([facility] [facility] 11/15/2018    Obstructive sleep apnea     not using CPAP    Palpitation 4/14/2011    Psoriatic arthritis (HCC)     fingers, knees, ankles    SOB (shortness of breath) on exertion 4/14/2011       Past Surgical History:  Past Surgical History:   Procedure Laterality Date    COLONOSCOPY N/A 12/31/2018    COLONOSCOPY; BIOPSY; APC OF BLEEDING ULCER; POLYPECTOMY performed by Vaibhav Davis MD at [address] HX TONSIL AND ADENOIDECTOMY      HX TONSILLECTOMY      [place] CARDIAC SURG PROCEDURE UNLIST      cardiac stent placement 18 months ago- Dr Martha Tellez       Family History:  Family History   Problem Relation Age of Onset    Heart Attack Mother 36    Heart Attack Father 48    Heart Surgery Father 48       Social History:  Social History     Tobacco Use    Smoking status: Former Smoker     Packs/day: 2.00     Years: 18.00     Pack years: 36.00     Types: Cigarettes     Quit date: 2008     Years since quittin.7    Smokeless tobacco: Never Used   Substance Use Topics    Alcohol use: Yes     Comment: 2 times a year    Drug use: No       Allergies: Allergies   Allergen Reactions    Animal Dander Rash         Review of Systems   Review of Systems   Unable to perform ROS: Severe respiratory distress     All other systems reviewed and are negative.     Physical Exam     Vitals:    21 0112 21 0126 21 0130 21 0139   BP: (!) 152/75 (!) 152/75 101/61 95/62   Pulse: (!) 115 (!) 110 (!) 109 (!) 105   Resp:   16 16   SpO2:   (!) 67%    Weight:         Physical Exam    Nursing notes and vital signs reviewed    Constitutional: Morbidly obese, severe distress, severely toxic appearing  Eyes: PERRL, EOMI, No conjunctival injection  ENT: external ears normal, No rhinorrhea, external nose normal, mucous membranes moist, frothy sputum in upper airway  Cardiovascular: Tachycardic, regular rhythm, no murmurs, No JVD  Respiratory: Coarse rhonchi bilaterally, No stridor, severe increased work of breathing and chest excursion bilaterally  Abdomen: Soft, non tender, non distended, normoactive bowel sounds, No rigidity, no peritoneal signs  Musculoskeletal:  No evidence of obvious injury to Head, Neck, Back, Extremities, no LE edema  Skin: Pale, cool extremities, No obvious rashes, striations noted throughout his body habitus  Neuro: Patient is lethargic and oriented to person and place, CN 2-12 intact, shortened speech, strength and sensation full and symmetric bilaterally  Psychiatric: Anxious appearing      Diagnostic Study Results     Labs -     Recent Results (from the past 72 hour(s))   CBC WITH AUTOMATED DIFF    Collection Time: 09/25/21 11:45 PM   Result Value Ref Range    WBC 4.8 4.6 - 13.2 K/uL    RBC 5.38 4.35 - 5.65 M/uL    HGB 13.8 13.0 - 16.0 g/dL    HCT 47.2 36.0 - 48.0 %    MCV 87.7 78.0 - 100.0 FL    MCH 25.7 24.0 - 34.0 PG    MCHC 29.2 (L) 31.0 - 37.0 g/dL    RDW 15.5 (H) 11.6 - 14.5 %    PLATELET 345 586 - 930 K/uL    MPV 10.6 9.2 - 11.8 FL    NEUTROPHILS 60 40 - 73 %    BAND NEUTROPHILS 16 (H) 0 - 5 %    LYMPHOCYTES 20 (L) 21 - 52 %    MONOCYTES 3 3 - 10 %    EOSINOPHILS 1 0 - 5 %    BASOPHILS 0 0 - 2 %    ABS. NEUTROPHILS 3.7 1.8 - 8.0 K/UL    ABS. LYMPHOCYTES 1.0 0.9 - 3.6 K/UL    ABS. MONOCYTES 0.1 0.05 - 1.2 K/UL    ABS. EOSINOPHILS 0.0 0.0 - 0.4 K/UL    ABS. BASOPHILS 0.0 0.0 - 0.1 K/UL    DF MANUAL      PLATELET COMMENTS ADEQUATE PLATELETS      RBC COMMENTS NORMOCYTIC, NORMOCHROMIC     METABOLIC PANEL, COMPREHENSIVE    Collection Time: 09/25/21 11:45 PM   Result Value Ref Range    Sodium 140 136 - 145 mmol/L    Potassium 6.1 (HH) 3.5 - 5.5 mmol/L    Chloride 112 (H) 100 - 111 mmol/L    CO2 12 (L) 21 - 32 mmol/L    Anion gap 16 3.0 - 18 mmol/L    Glucose 160 (H) 74 - 99 mg/dL    BUN 46 (H) 7.0 - 18 MG/DL    Creatinine 6.86 (H) 0.6 - 1.3 MG/DL    BUN/Creatinine ratio 7 (L) 12 - 20      GFR est AA 11 (L) >60 ml/min/1.73m2    GFR est non-AA 9 (L) >60 ml/min/1.73m2    Calcium 7.8 (L) 8.5 - 10.1 MG/DL    Bilirubin, total 0.5 0.2 - 1.0 MG/DL    ALT (SGPT) 111 (H) 16 - 61 U/L    AST (SGOT) 129 (H) 10 - 38 U/L    Alk.  phosphatase 133 (H) 45 - 117 U/L    Protein, total 6.3 (L) 6.4 - 8.2 g/dL    Albumin 2.0 (L) 3.4 - 5.0 g/dL    Globulin 4.3 (H) 2.0 - 4.0 g/dL    A-G Ratio 0.5 (L) 0.8 - 1.7     MAGNESIUM    Collection Time: 09/25/21 11:45 PM   Result Value Ref Range    Magnesium 2.5 1.6 - 2.6 mg/dL PROTHROMBIN TIME + INR    Collection Time: 09/25/21 11:45 PM   Result Value Ref Range    Prothrombin time 14.2 11.5 - 15.2 sec    INR 1.2 0.8 - 1.2     PTT    Collection Time: 09/25/21 11:45 PM   Result Value Ref Range    aPTT 39.7 (H) 23.0 - 36.4 SEC   FIBRINOGEN    Collection Time: 09/25/21 11:45 PM   Result Value Ref Range    Fibrinogen 740 (H) 210 - 451 mg/dL   D DIMER    Collection Time: 09/25/21 11:45 PM   Result Value Ref Range    D DIMER 1.97 (H) <0.46 ug/ml(FEU)   CARDIAC PANEL,(CK, CKMB & TROPONIN)    Collection Time: 09/25/21 11:45 PM   Result Value Ref Range    CK - MB 37.2 (H) <3.6 ng/ml    CK-MB Index 6.7 (H) 0.0 - 4.0 %     (H) 39 - 308 U/L    Troponin-I, QT 1.52 (HH) 0.0 - 0.045 NG/ML   NT-PRO BNP    Collection Time: 09/25/21 11:45 PM   Result Value Ref Range    NT pro-BNP 1,102 (H) 0 - 450 PG/ML   COVID-19 RAPID TEST    Collection Time: 09/26/21 12:15 AM   Result Value Ref Range    Specimen source Nasopharyngeal      COVID-19 rapid test Detected (AA) NOTD     EKG, 12 LEAD, INITIAL    Collection Time: 09/26/21  1:11 AM   Result Value Ref Range    Ventricular Rate 113 BPM    Atrial Rate 113 BPM    P-R Interval 116 ms    QRS Duration 96 ms    Q-T Interval 344 ms    QTC Calculation (Bezet) 471 ms    Calculated P Axis 55 degrees    Calculated R Axis 99 degrees    Calculated T Axis 90 degrees    Diagnosis       Sinus tachycardia  Possible Left atrial enlargement  Rightward axis  Possible Inferior infarct (cited on or before 26-DEC-2018)  Abnormal ECG  When compared with ECG of 26-DEC-2018 20:16,  Questionable change in initial forces of Inferior leads  ST elevation now present in Inferior leads  T wave amplitude has increased in Anterior leads         Radiologic Studies -   XR CHEST PORT    (Results Pending)     CT Results  (Last 48 hours)    None        CXR Results  (Last 48 hours)    None          Medications given in the ED-  Medications   NOREPINephrine (LEVOPHED) 8 mg in 0.9% NS 250ml infusion (has no administration in time range)   dexamethasone (DECADRON) 4 mg/mL injection 20 mg (20 mg IntraVENous Given 9/26/21 0024)   dexmedeTOMidine (PRECEDEX) 400 mcg in 0.9% sodium chloride 100 mL infusion (0.5 mcg/kg/hr × 158.8 kg (Order-Specific) IntraVENous Rate Change 9/26/21 0140)   sodium chloride (NS) flush 5-40 mL (has no administration in time range)   sodium chloride (NS) flush 5-40 mL (has no administration in time range)   acetaminophen (TYLENOL) tablet 650 mg (has no administration in time range)     Or   acetaminophen (TYLENOL) suppository 650 mg (has no administration in time range)   polyethylene glycol (MIRALAX) packet 17 g (has no administration in time range)   ondansetron (ZOFRAN ODT) tablet 4 mg (has no administration in time range)     Or   ondansetron (ZOFRAN) injection 4 mg (has no administration in time range)   famotidine (PF) (PEPCID) 20 mg in 0.9% sodium chloride 10 mL injection (has no administration in time range)   etomidate (AMIDATE) 2 mg/mL injection 40 mg (40 mg IntraVENous Given 9/26/21 0000)   rocuronium injection 100 mg (100 mg IntraVENous Given 9/26/21 0018)   furosemide (LASIX) injection 80 mg (80 mg IntraVENous Given 9/26/21 0112)   nitroglycerin (NITROBID) 2 % ointment 1 Inch (1 Inch Topical Given 9/26/21 0126)         Medical Decision Making     I reviewed the vital signs, available nursing notes, past medical history, past surgical history, family history and social history. Vital Signs interpretation- I have reviewed the patient's vital signs.     Pulse Oximetry interpretation - 50% on Room air     Cardiac Monitor interpretation:  Rate: 113 bpm  Rhythm: sinus    EKG interpretation: (Preliminary)  EKG interpretation by Dr. Sarah Perera        Records Reviewed: Nursing Notes and Old Medical Records    Procedures:  Central Line    Date/Time: 9/26/2021 12:55 AM  Performed by: Myrna Frederick DO  Authorized by: Myrna Frederick DO     Consent:     Consent obtained:  Emergent situation  Pre-procedure details:     Hand hygiene: Hand hygiene performed prior to insertion      Sterile barrier technique: All elements of maximal sterile technique followed      Skin preparation:  2% chlorhexidine  Procedure details:     Location:  R internal jugular    Patient position:  Flat    Procedural supplies:  Triple lumen    Catheter size:  7.5 Fr    Landmarks identified: yes      Ultrasound guidance: yes      Number of attempts:  1    Successful placement: yes    Post-procedure details:     Post-procedure:  Dressing applied and line sutured    Assessment:  Blood return through all ports, no pneumothorax on x-ray and placement verified by x-ray    Patient tolerance of procedure: Tolerated well, no immediate complications  Intubation    Date/Time: 9/26/2021 12:56 AM  Performed by: Micaela Ellison DO  Authorized by: Micaela Ellison DO     Consent:     Consent obtained:  Verbal and emergent situation    Consent given by:  Patient  Pre-procedure details:     Patient status:  Awake  Procedure details:     Preoxygenation:  Bag valve mask    CPR in progress: no      Intubation method:  Oral    Oral intubation technique:  Video-assisted    Laryngoscope blade: Mac 4    Tube size (mm):  7.5    Tube type:  Cuffed    Number of attempts:  1    Tube visualized through cords: yes    Placement assessment:     ETT to lip:  26    ETT to teeth:  24    Tube secured with:  ETT monteiro    Breath sounds:  Equal    Placement verification: chest rise, condensation, CXR verification, direct visualization, equal breath sounds and ETCO2 detector      CXR findings:  ETT in proper place  Post-procedure details:     Patient tolerance of procedure: Tolerated well, no immediate complications        ED Course and MDM:  Considering the patient significant distress and respiratory failure decision was made to intubate.   Patient was agreeable with this by nodding his head and verbally stating yes to being put on a ventilator and being put in a medical coma for this. Patient was sedated using RSI with 40 mg of etomidate and 100 mg of rocuronium. 7.5 ET tube was placed at 0000. We will continue with sedation with Precedex. He was noted to be mildly hypotensive blood pressure around 95/60 mean arterial pressure of 65. A right IJ central line was placed. Chest x-ray shows appropriate positioning of the ET tube and central line. Patient was started on IV Levophed for blood pressure support. We will hold significant amount of fluid due to his pulmonary status. There is significant amount of frothy pink sputum and suction was done multiple times with respiratory therapy. His ventilation settings were switched from assisted control to APRV at maximum settings although his oxygenation is only able to get up to about 70%. Patient was given IV Decadron. His blood work is showing his kidney disease although does appear significantly worse with his creatinine going from 3.05-6.86 over the last month. His potassium is noted to be 6.1 nonhemolyzed. We will treat with insulin and glucose. His troponin is noted to be elevated at 1.52 likely type II in nature due to his hypoxemia. CONSULT NOTE:   Dr. Christina Funez spoke with Dr. Leslie Panda   Specialty: pulm cc  Discussed pt's hx, disposition, and available diagnostic and imaging results over the telephone. Reviewed care plans. Recommends try to transfer patient to [facility] for possible ECMO. CONSULT NOTE:   Dr. Christina Funez spoke with Dr. Maris Yap   Specialty: hospitalist  Discussed pt's hx, disposition, and available diagnostic and imaging results over the telephone. Reviewed care plans. Came down to evaluate the patient considering his critical state was concerned that he would not be a candidate for ECMO and will speak with the patient's wife regarding comfort care.      Spoke with transfer center at [facility] and spoke with their critical care CT surgeon Dr. Roverto Abbott who is responsible for their ECMO. At this time they stated that they do not have ICU beds and would be unable to transfer him. We will plan for admission as a ER hold at our facility as we do not currently have ICU beds either. We will have a monroe discussion with his wife regarding the critical nature of his condition and goals of care. Diagnosis and Disposition     Critical Care: 12:56 AM  I have spent 75 minutes of critical care time involved in lab review, consultations with specialist, family decision-making, and documentation. During this entire length of time I was immediately available to the patient. Critical Care: The reason for providing this level of medical care for this critically ill patient was due a critical illness that impaired one or more vital organ systems such that there was a high probability of imminent or life threatening deterioration in the patients condition. This care involved high complexity decision making to assess, manipulate, and support vital system functions, to treat this degreee vital organ system failure and to prevent further life threatening deterioration of the patients condition. Core Measures:  For Hospitalized Patients:    1. Hospitalization Decision Time:  The decision to hospitalize the patient was made by Vee Bernstein DO at 1:47 AM on 9/25/2021    2. Aspirin: Aspirin was not given because the patient did not present with a stroke at the time of their Emergency Department evaluation    1:31 AM  Patient is being admitted to the hospital by Dr. Brenda Nolan. The results of their tests and reasons for their admission have been discussed with them and/or available family. They convey agreement and understanding for the need to be admitted and for their admission diagnosis. CONDITIONS ON ADMISSION:  Sepsis is not present at the time of admission. Deep Vein Thrombosis is not present at the time of admission. Thrombosis is not present at the time of admission. Urinary Tract Infection is not present at the time of admission. Covid pneumonia is present at the time of admission. MRSA is not present at the time of admission. Wound infection is not present at the time of admission. Pressure Ulcer is not present at the time of admission. CLINICAL IMPRESSION:    1. Acute hypoxemic respiratory failure due to COVID-19 ([facility] [address]    2. CKD (chronic kidney disease) stage 5, GFR less than 15 ml/min ([facility])    3. Acute respiratory failure with hypoxia ([facility])    4. Morbid obesity ([facility] [address]    5. Shock ([facility] [address]    6. SIRS (systemic inflammatory response syndrome) ([facility])    7. Elevated troponin        Please note that this dictation was completed with resmio, the computer voice recognition software. Quite often unanticipated grammatical, syntax, homophones, and other interpretive errors are inadvertently transcribed by the computer software. Please disregard these errors. Please excuse any errors that have escaped final proofreading.

## 2021-09-26 NOTE — PROGRESS NOTES
I was finally able to reach his wife after about an hour of ongoing attempts. She had fallen asleep at home. I advised her to come to the hospital immediately to discuss a plan of care. She will bring her daughter. Since they are COVID+ also, I notified the ED charge RN who will give them masks and keep them isolated in the patient's room.

## 2021-09-27 NOTE — PROGRESS NOTES
Physician Progress Note      John Groves  CSN #:                  [id number]  :                       1980  ADMIT DATE:       2021 11:44 PM  Shawn Coulter DATE:        2021 8:38 AM  RESPONDING  PROVIDER #:        Wale Guillaume MD          QUERY TEXT:    Pt admitted with COVID 19 pneumonia and acute respiratory failure. Pt noted to have shock and SIRS documented by ER physician. If possible, please document in the progress notes and discharge summary if you are evaluating and /or treating any of the following: The medical record reflects the following:  Risk Factors: super morbid obesity, immunosuppressed, CKD V, HAWA, CAD, former heavy tobacco smoker, Spouse +COVID  Clinical Indicators: Patient presented by EMS with acute respiratory failure. Rapid Covid+  VS: P 113, RR 28  O2 sats 67% BP 83/60  WBC 4.8, 16% bands   CRP 32.5   Procalcitonin 12.65  ER:  Acute hypoxemic respiratory failure due to COVID-19 CKD V, Shock, SIRS  H&P; Severe ARDS due to COVID 19 pneumonia, became hypotensive after placed on sedation, levophed ordered to maintain MAP, Elevated troponin is most likely due to critical illness rather than ACS. Treatment: Intubated on vent, IV Levophed, monitor labs and VS, family opted for DNR    Dean Brito RN, BSN, [address] [place] [address]  [phone]  Options provided:  -- Sepsis due to COVID 19 PNA, present on admission  -- Severe Sepsis due to COVID 19 PNA with associated ARDS, present on admission  -- Severe Sepsis with Septic shock due to COVID 19 PNA, present on admission  -- Other - I will add my own diagnosis  -- Disagree - Not applicable / Not valid  -- Disagree - Clinically unable to determine / Unknown  -- Refer to Clinical Documentation Reviewer    PROVIDER RESPONSE TEXT:    This patient has Sepsis due to COVID 19 PNA which was present on admission.     Query created by: Faisal Smalls on 2021 3:31 PM      Electronically signed by:  Christina Mcintosh ALEJANDRO MUÑOZ 9/27/2021 7:46 PM

## (undated) DEVICE — FIAPC® PROBE W/ FILTER 2200 A OD 2.3MM/6.9FR; L 2.2M/7.2FT: Brand: ERBE

## (undated) DEVICE — MOUTHPIECE ENDOSCP 20X27MM --

## (undated) DEVICE — CANNULA CUSH AD W/ 14FT TBG

## (undated) DEVICE — SPONGE GZ W4XL4IN COT 12 PLY TYP VII WVN C FLD DSGN

## (undated) DEVICE — KENDALL RADIOLUCENT FOAM MONITORING ELECTRODE RECTANGULAR SHAPE: Brand: KENDALL

## (undated) DEVICE — NDL PRT INJ NSAF BLNT 18GX1.5 --

## (undated) DEVICE — ERBE NESSY®PLATE 170 SPLIT; 168CM²; CABLE 3M: Brand: ERBE

## (undated) DEVICE — SYR 5ML 1/5 GRAD LL NSAF LF --

## (undated) DEVICE — CATH SUC CTRL PRT TRIFLO 14FR --

## (undated) DEVICE — KENDALL SCD EXPRESS SLEEVES, KNEE LENGTH, MEDIUM: Brand: KENDALL SCD

## (undated) DEVICE — STERILE POLYISOPRENE POWDER-FREE SURGICAL GLOVES: Brand: PROTEXIS

## (undated) DEVICE — GARMENT,MEDLINE,DVT,INT,CALF,MED, GEN2: Brand: MEDLINE

## (undated) DEVICE — GOWN ISOLATN REG BLU POLY UNISX W/ THMB LOOP

## (undated) DEVICE — WRISTBAND ID AD W2.5XL9.5CM RED VYN ADH CLSR UNI-PRINT

## (undated) DEVICE — SPONGE GZ W4XL4IN RAYON POLY 4 PLY NONWOVEN FASTER WICKING

## (undated) DEVICE — FORCEPS BX CAP 240CM L RAD JAW 4

## (undated) DEVICE — CATH IV SAFE STR 22GX1IN BLU -- PROTECTIV PLUS

## (undated) DEVICE — MAJ-1414 SINGLE USE ADPATER BIOPSY VALV: Brand: SINGLE USE ADAPTOR BIOPSY VALVE

## (undated) DEVICE — TRNQT TEXT 1X18IN BLU LF DISP -- CONVERT TO ITEM 362165

## (undated) DEVICE — NDL FLTR TIP 5 MIC 18GX1.5IN --

## (undated) DEVICE — TRAP SPEC COLL POLYP POLYSTYR --

## (undated) DEVICE — MEDI-VAC NON-CONDUCTIVE SUCTION TUBING: Brand: CARDINAL HEALTH

## (undated) DEVICE — SYRINGE 50ML E/T

## (undated) DEVICE — SET ADMIN 16ML TBNG L100IN 2 Y INJ SITE IV PIGGY BK DISP

## (undated) DEVICE — ENDO CARRY-ON PROCEDURE KIT INCLUDES ENZYMATIC SPONGE, GAUZE, BIOHAZARD LABEL, TRAY, LUBRICANT, DIRTY SCOPE LABEL, WATER LABEL, TRAY, DRAWSTRING PAD, AND DEFENDO 4-PIECE KIT.: Brand: ENDO CARRY-ON PROCEDURE KIT

## (undated) DEVICE — SYR 3ML LL TIP 1/10ML GRAD --

## (undated) DEVICE — STRAP,POSITIONING,KNEE/BODY,FOAM,4X60": Brand: MEDLINE

## (undated) DEVICE — SINGLE PORT MANIFOLD: Brand: NEPTUNE 2

## (undated) DEVICE — REM POLYHESIVE ADULT PATIENT RETURN ELECTRODE: Brand: VALLEYLAB

## (undated) DEVICE — SOLUTION IV 500ML 0.9% SOD CHL FLX CONT